# Patient Record
(demographics unavailable — no encounter records)

---

## 2017-03-27 NOTE — EMERGENCY ROOM VISIT NOTE
History


Report prepared by Osiris:  Montserrat Novoa


Under the Supervision of:  Dr. Joy De Oliveira D.O.


First contact with patient:  03:43


Chief Complaint:  OTHER COMPLAINT





History of Present Illness


The patient is a 33 year old male who presents to the Emergency Room for 

medical clearance upon being arrested this evening. The patient is a current 

meth user for the past 2 weeks, using everyday by snorting and smoking the 

drug. The patient has also been using Suboxone for the past 2 years everyday 

orally. He notes the Suboxone is not his prescription. He states that he has 

not been drinking much recently however he is typically a heavy drinker. The 

patient does not normally use meth but began due to fights with his girlfriend 

who is a recovering addict. Last time using meth was yesterday. He denies any 

other symptoms at this time.





   Source of History:  patient


   History Limited By:  other


   Onset:  tonight


   Position:  other (global)


   Quality:  other (medical clearance)


Note:


Patient has been using meth for 2 weeks and Suboxone for 2 years. He denies any 

symptoms at this time.





Review of Systems


See HPI for pertinent positives & negatives. A total of 10 systems reviewed and 

were otherwise negative.





Past Medical & Surgical


No PMH





Family History





Patient reports no known family medical history.





Social History


Smoking Status:  Current Every Day Smoker


Alcohol Use:  heavy


Drug Use:  other (meth)


Marital Status:  in relationship


Housing Status:  lives with significant other


Occupation Status:  employed





Current/Historical Medications


No Active Prescriptions or Reported Meds





Allergies


Coded Allergies:  


     Fish (Verified  Allergy, Severe, ANAPHYLAXIS, 3/27/17)





Physical Exam


Vital Signs











  Date Time  Temp Pulse Resp B/P Pulse Ox O2 Delivery O2 Flow Rate FiO2


 


3/27/17 04:05  84 18 138/93 99   


 


3/27/17 03:39 36.6 78 18 138/93 99 Room Air  











Physical Exam


HEENT: Head - normocephalic and atraumatic   Pupils are equal, round, and 

reactive to light.  Extraocular eye muscles are intact, and sclera are 

anicteric.   Nose -  moist nasal mucosa without discharge. Mouth - moist buccal 

mucosa.  Oropharynx is nonerythematous and there is no tonsillar exudate or 

edema noted.


Neck: Supple; no JVD, nuchal rigidity, cervical lymphadenopathy.


Heart: Regular rate and rhythm.  There is a normal S1 and S2 with no murmurs, 

clicks, or gallops appreciated.


Lungs: Clear to auscultation bilaterally with no wheezes, rales, or rhonchi.


Abdomen: Soft, completely nontender, nondistended, with good bowel sounds.  

There are no palpable pulsatile masses or hepatosplenomegaly.  There is no 

guarding, rigidity, or rebound noted.


Extremities: Superficial laceration of ventral aspect of right 5th digit. No 

evidence of cyanosis, clubbing, or edema.  There are easily palpable peripheral 

pulses.


Skin: warm and dry with good turgor and no rashes.





Medical Decision & Procedures


ED Course


0347: The patient was evaluated in room B12. A complete history and physical 

exam was performed.  I looked at the wound on his right hand.  It was redressed 

with a Band-Aid.





0356: The patient was hemodynamically stable.  He had no complaints of pain.  

He only complained of sleep deprivation.  The patient will be discharged in the 

care of police.





Medical Decision


The patient is a 33 year old male who presents to the ED for medical clearance 

before he goes to penitentiary.





The patient has been abusing oral Suboxone and meth for the past 2 weeks.  He 

has not slept in last 5 days.  He got into an argument with his girlfriend 

tonight and states that he went crazy.  Police were called.  They responded to 

the home and did not find the patient.  He was out for a stroll as he describes 

it.  Finally he got tired and came back to the house.


He was arrested at that time brought here for medical clearance.





Impression





 Primary Impression:  


 Polysubstance abuse


 Additional Impression:  


 Sleep deprivation





Scribe Attestation


The scribe's documentation has been prepared under my direction and personally 

reviewed by me in its entirety. I confirm that the note above accurately 

reflects all work, treatment, procedures, and medical decision making performed 

by me.





Departure Information


Dispostion


Other (Prision)





Prescriptions





No Active Prescriptions or Reported Meds





Referrals


No Doctor, Assigned (PCP)





Forms


HOME CARE DOCUMENTATION FORM,                                                 

               IMPORTANT VISIT INFORMATION, WORK / SCHOOL INSTRUCTIONS





Patient Instructions


My [facility] [employer]





Additional Instructions





Avoid suboxone abuse and meth use.





Keep the wound on your finger clean with soap and water.





Watch for signs of infection





Problem Qualifiers

## 2017-07-11 NOTE — DIAGNOSTIC IMAGING REPORT
CHEST 2 VIEWS ROUTINE



CLINICAL HISTORY: R07.89 Atypical chest pain    



COMPARISON STUDY:  No previous studies for comparison.



FINDINGS: The cardiac and mediastinal contours are normal. There is no evidence

of focal pulmonary consolidation. There is no evidence of failure. No pleural

effusions are visualized.[ There are linear opacities the right lung base,

likely representing subsegmental atelectasis. There is a retroxiphoid opacity,

likely representing a prominent fat pad.



IMPRESSION: 

1. Linear right basilar opacities, likely atelectatic

2. Retroxiphoid opacity, likely secondary to a prominent fat pad

3. No evidence of lobar consolidation. No evidence of failure. No evidence of

pneumothorax.







Electronically signed by:  Haseeb Rooney M.D.

7/11/2017 3:50 PM



Dictated Date/Time:  7/11/2017 3:48 PM

## 2017-07-14 NOTE — DIAGNOSTIC IMAGING REPORT
(CHEST FOR PE) ANGIO WITH



CLINICAL HISTORY: 34 years-old Male presenting with chest pain. 



TECHNIQUE: Multidetector CT angiography of the chest was performed after

administration of intravenous contrast. 3-D volumetric and maximum intensity

projection (MIP) images were subsequently reconstructed for review. IV contrast:

100 mL of Optiray 320.



COMPARISON: Chest x-ray performed the same day.



CT DOSE: The estimated cumulative dose is 389.57 mGy.cm.



FINDINGS:



 topogram: Bandlike opacity in the right lung base.



Pulmonary vasculature:



The study is adequate for assessment of the pulmonary vascular tree. Filling

defect within segmental pulmonary arteries to the anteromedial basal left lower

lobe, which appear acute. Main pulmonary artery not enlarged. No flattening of

the interventricular septum. No intracardiac filling defect.



Remaining chest:



On soft tissue windows, normal thyroid and thoracic inlet. No axillary,

supraclavicular, or mediastinal lymphadenopathy. Few prominent lymph nodes in

the right hilum. Top normal heart size. No pericardial effusion. Trace right

pleural effusion.



On lung windows, bandlike opacities in the right middle and lower lobes.

Groundglass opacity in the right apex and right lung base dependently. No

opacity in the left lung. Airways patent.



On bone windows, normal osseous structures.



IMPRESSION:

1.  Acute pulmonary embolus in the anteromedial basal segmental artery to the

left lower lobe. No radiographic evidence of right heart failure.



2.  Bandlike and hazy opacities in the right lung may represent atelectasis,

although the presence of a trace right effusion may raise concern for other

etiologies including infection.



The report will be called/faxed according to standard departmental protocol.







Electronically signed by:  Kurt Nam M.D.

7/14/2017 3:55 PM



Dictated Date/Time:  7/14/2017 3:45 PM

## 2017-07-14 NOTE — EMERGENCY ROOM VISIT NOTE
History


Report prepared by Osiris:  Yury Patton


Under the Supervision of:  Dr. Stanford Rey M.D.


First contact with patient:  13:45


Chief Complaint:  RESPIRATORY PROBLEMS


Stated Complaint:  DIFFICULTY BREATHING-SENT BY DR.-WTE GARCIA [facility] Triage Summary:  


Pt states "Dr Elvia Epstein said my white count in my one lung is high and in the 


other it's off. I think I have walking pneumonia." Denies cough. Pt c/o pain in 


"my lungs" x 2 days.  "I'm disoriented, I've been fired from 2 jobs in the last 


week". States they did xray and blood work at [facility] but did not prescribe any 


antibiotics. Pt states he passed out twice 2 days ago.





Takes Gabapentin for anxiety.





History of Present Illness


The patient is a 34 year old male who presents to the Emergency Room with 

complaints of constant shortness of breath for the past few days, and a sharp 

pain in his back The patient states that he was told that he had walking 

pneumonia yesterday, though he was not prescribed any antibiotics. The patient 

additionally states that he has a fever and chills. He denies any cough or 

abdominal pain. He states that he might have had pneumonia when he was younger, 

and he smokes about a pack per day. The patient states that he took a 

gabapentin this morning.





   Source of History:  patient


   Onset:  a few days ago


   Position:  other (global)


   Quality:  other (shortness of breath)


   Timing:  constant


   Associated Symptoms:  + fevers, + chills, No cough, No abdominal pain





Review of Systems


All systems have been listed, reviewed, and are negative other than those 

previously mentioned. Please see Additional Medical History Sheet.





Family History





FH: pulmonary embolism


Heart disease


Hypertension





Social History


Smoking Status:  Current Every Day Smoker


Alcohol Use:  heavy


Drug Use:  other


Marital Status:  in relationship


Housing Status:  lives with significant other


Occupation Status:  employed





Current/Historical Medications


No Active Prescriptions or Reported Meds





Allergies


Coded Allergies:  


     Fish (Verified  Allergy, Severe, ANAPHYLAXIS, 7/14/17)





Physical Exam


Vital Signs











  Date Time  Temp Pulse Resp B/P (MAP) Pulse Ox O2 Delivery O2 Flow Rate FiO2


 


7/14/17 17:43  71 18 139/66 97 Room Air  


 


7/14/17 16:08  76 18 120/65 98 Room Air  


 


7/14/17 16:07  80      


 


7/14/17 15:16  89 18 149/82 95 Room Air  


 


7/14/17 14:04   18 167/88 98   


 


7/14/17 12:27     98 Room Air  


 


7/14/17 12:22 37.2 110 18 129/79 97 Room Air  











Physical Exam


GENERAL: Patient awake, alert, oriented x 3.  Patient follows commands.  

Patient does not appear toxic.  Patient is adequately hydrated and well-

nourished.  


SKIN: No erythema, pallor, cyanosis or rash


HEENT: Normal head, pupils equal, reactive to light and accommodation.  Ears 

normal.  Oral cavity and posterior pharynx appear normal.  Neck: Without 

adenopathy, no neck vein distention.


LUNGS: Some pain with deep inspiration. Clear to auscultation.  No wheezes, no 

rales, no rhonchi.


HEART:  No murmurs. No gallops. No rubs


EXTREMITIES: No signs of trauma or infection.


NEUROLOGIC: Cranial nerves II-XII within normal limits.  No gross motor sensory 

function deficits.





Medical Decision & Procedures


ER Provider


Diagnostic Interpretation:


Radiology results as stated below per my review and radiologist interpretation:








CHEST 2 VIEWS ROUTINE





CLINICAL HISTORY: Difficulty breathing    





COMPARISON STUDY:  7/11/2017





FINDINGS: The heart remains normal in size. There are progressive right basilar


airspace opacities. This favors an inflammatory over atelectatic process. Again


evident is a retroxiphoid opacity. Previously this is felt to represent a


prominent fat pad. It is conceivable that this represents a pleural-based area


of focal consolidation. CT angiography the chest might be considered to exclude


a pulmonary infarct.[ 





IMPRESSION: 


1. Progressive right basilar airspace opacities


2. Retroxiphoid opacity. A pleural-based area of parenchymal consolidation


cannot be excluded.


3. CT angiography of the chest should be considered in follow-up to exclude


pulmonary embolism with infarction.





Electronically signed by:  Haseeb Rooney M.D.


7/14/2017 1:54 PM





Dictated Date/Time:  7/14/2017 1:52 PM











(CHEST FOR PE) ANGIO WITH





CLINICAL HISTORY: 34 years-old Male presenting with chest pain. 





TECHNIQUE: Multidetector CT angiography of the chest was performed after


administration of intravenous contrast. 3-D volumetric and maximum intensity


projection (MIP) images were subsequently reconstructed for review. IV contrast:


100 mL of Optiray 320.





COMPARISON: Chest x-ray performed the same day.





CT DOSE: The estimated cumulative dose is 389.57 mGy.cm.





FINDINGS:





 topogram: Bandlike opacity in the right lung base.





Pulmonary vasculature:





The study is adequate for assessment of the pulmonary vascular tree. Filling


defect within segmental pulmonary arteries to the anteromedial basal left lower


lobe, which appear acute. Main pulmonary artery not enlarged. No flattening of


the interventricular septum. No intracardiac filling defect.





Remaining chest:





On soft tissue windows, normal thyroid and thoracic inlet. No axillary,


supraclavicular, or mediastinal lymphadenopathy. Few prominent lymph nodes in


the right hilum. Top normal heart size. No pericardial effusion. Trace right


pleural effusion.





On lung windows, bandlike opacities in the right middle and lower lobes.


Groundglass opacity in the right apex and right lung base dependently. No


opacity in the left lung. Airways patent.





On bone windows, normal osseous structures.





IMPRESSION:


1.  Acute pulmonary embolus in the anteromedial basal segmental artery to the


left lower lobe. No radiographic evidence of right heart failure.





2.  Bandlike and hazy opacities in the right lung may represent atelectasis,


although the presence of a trace right effusion may raise concern for other


etiologies including infection.





The report will be called/faxed according to standard departmental protocol.





Electronically signed by:  Kurt Nam M.D.


7/14/2017 3:55 PM





Dictated Date/Time:  7/14/2017 3:45 PM





Laboratory Results


7/14/17 14:10








Red Blood Count 5.08, Mean Corpuscular Volume 87.2, Mean Corpuscular Hemoglobin 

31.7, Mean Corpuscular Hemoglobin Concent 36.3, Mean Platelet Volume 9.5, 

Neutrophils (%) (Auto) 61.4, Lymphocytes (%) (Auto) 21.8, Monocytes (%) (Auto) 

11.3, Eosinophils (%) (Auto) 4.1, Basophils (%) (Auto) 0.7, Neutrophils # (Auto

) 5.51, Lymphocytes # (Auto) 1.95, Monocytes # (Auto) 1.01, Eosinophils # (Auto

) 0.37, Basophils # (Auto) 0.06





7/14/17 14:10

















Test


  7/14/17


14:10


 


White Blood Count


  8.96 K/uL


(4.8-10.8)


 


Red Blood Count


  5.08 M/uL


(4.7-6.1)


 


Hemoglobin


  16.1 g/dL


(14.0-18.0)


 


Hematocrit 44.3 % (42-52) 


 


Mean Corpuscular Volume


  87.2 fL


()


 


Mean Corpuscular Hemoglobin


  31.7 pg


(25-34)


 


Mean Corpuscular Hemoglobin


Concent 36.3 g/dl


(32-36)


 


Platelet Count


  247 K/uL


(130-400)


 


Mean Platelet Volume


  9.5 fL


(7.4-10.4)


 


Neutrophils (%) (Auto) 61.4 % 


 


Lymphocytes (%) (Auto) 21.8 % 


 


Monocytes (%) (Auto) 11.3 % 


 


Eosinophils (%) (Auto) 4.1 % 


 


Basophils (%) (Auto) 0.7 % 


 


Neutrophils # (Auto)


  5.51 K/uL


(1.4-6.5)


 


Lymphocytes # (Auto)


  1.95 K/uL


(1.2-3.4)


 


Monocytes # (Auto)


  1.01 K/uL


(0.11-0.59)


 


Eosinophils # (Auto)


  0.37 K/uL


(0-0.5)


 


Basophils # (Auto)


  0.06 K/uL


(0-0.2)


 


RDW Standard Deviation


  35.9 fL


(36.4-46.3)


 


RDW Coefficient of Variation


  11.3 %


(11.5-14.5)


 


Immature Granulocyte % (Auto) 0.7 % 


 


Immature Granulocyte # (Auto)


  0.06 K/uL


(0.00-0.02)


 


Anion Gap


  4.0 mmol/L


(3-11)


 


Est Creatinine Clear Calc


Drug Dose 92.3 ml/min 


 


 


Estimated GFR (


American) 90.9 


 


 


Estimated GFR (Non-


American 78.4 


 


 


BUN/Creatinine Ratio 11.9 (10-20) 


 


Calcium Level


  9.3 mg/dl


(8.5-10.1)


 


Total Bilirubin


  0.5 mg/dl


(0.2-1)


 


Aspartate Amino Transf


(AST/SGOT) 26 U/L (15-37) 


 


 


Alanine Aminotransferase


(ALT/SGPT) 33 U/L (12-78) 


 


 


Alkaline Phosphatase


  156 U/L


()


 


Troponin I


  < 0.015 ng/ml


(0-0.045)


 


Total Protein


  7.5 gm/dl


(6.4-8.2)


 


Albumin


  3.6 gm/dl


(3.4-5.0)


 


Globulin


  3.9 gm/dl


(2.5-4.0)


 


Albumin/Globulin Ratio 0.9 (0.9-2) 








Laboratory results as stated above per my review.





Medications Administered











 Medications


  (Trade)  Dose


 Ordered  Sig/Jae


 Route  Start Time


 Stop Time Status Last Admin


Dose Admin


 


 Ibuprofen


  (Motrin Tab)  600 mg  NOW  STAT


 PO  7/14/17 13:49


 7/14/17 13:50 DC 7/14/17 14:05


600 MG











ECG


Indication:  SOB/dyspnea


Rate (beats per minute):  71


Rhythm:  normal sinus


Findings:  nonspecific-ST abn, no ectopy





ED Course


1345: Past medical records reviewed. The patient was evaluated in room B7. A 

complete history and physical examination was performed. 





1349: Ibuprofen 600mg PO





1616:I discussed the patient's case with Dr. Bejarano, Hospitalist, and he thinks 

that the patient can be discharged home.





1700: I discussed the patients case with Dr. Baeza, Internist, and he is 

going to follow up with the patient on Monday at 6pm in his office.





1715: Xarelto Tab 15mg PO





1739: Upon reevaluation, the patient appeared to have improvement of his 

symptoms. I discussed today's findings with him. He verbalized agreement of the 

treatment plan. He was discharged home.





Medical Decision


Nurses notes reviewed. Medical history sheet reviewed. Differential diagnosis 

includes but is not limited to: pneumonia, atelectasis, muscle strain, rib 

fracture.





Blood pressure Screening: Patient was found to have normal blood pressure on 

screening and does not require follow up.





Medication Reconciliation: I attest that I have personally reviewed the patient'

s current medication list.





Multiple labs, EKG and imaging were obtained.  Please see above.  The patient 

has a segmental PE.  I believe this is the source of his discomfort.  I 

discussed care with  who believes he can safely be discharged home on 

Xarelto.  The patient was started on Xarelto now.  He will continue that 

medication bid.  The patient has a scheduled appointment on 7/17 for follow-up.

  He is encouraged to return here sooner if he's having more shortness of 

breath or chest pain.





Consults


Time Called:  1610


Consulting Physician:  Dr. Bejarano, Hospitalist


Returned Call:  1616


I discussed the patient's case with Dr. Bejarano, Hospitalist, and he thinks that 

the patient can be discharged home.


Additional Consults:  


   Time Called:  1650


   Consulted Physician:  Dr. Baeza


   Returned Call:  1700


Additional Comments:


 I discussed the patients case with Dr. Baeza, Internist, and he is going to 

follow up with the patient on Monday at 6pm in his office.





Impression





 Primary Impression:  


 Pulmonary embolism





Scribe Attestation


The scribe's documentation has been prepared under my direction and personally 

reviewed by me in its entirety. I confirm that the note above accurately 

reflects all work, treatment, procedures, and medical decision making performed 

by me.





Departure Information


Dispostion


Home / Self-Care





Prescriptions





No Active Prescriptions or Reported Meds





Referrals


No Doctor, Assigned (PCP)





Forms


HOME CARE DOCUMENTATION FORM,                                                 

               IMPORTANT VISIT INFORMATION, WORK / SCHOOL INSTRUCTIONS





Patient Instructions


My [facility] Heart Butter, Rivaroxaban oral tablets





Additional Instructions





15 mg of Xarelto twice a day with food for the next 21 days.


Follow-up with  on Monday at 6 pm  [facility] office. 


Return here sooner if you become more short of breath or develop more chest 

pain.


Slowly wean your self off of gabapentin.

## 2017-07-14 NOTE — DIAGNOSTIC IMAGING REPORT
CHEST 2 VIEWS ROUTINE



CLINICAL HISTORY: Difficulty breathing    



COMPARISON STUDY:  7/11/2017



FINDINGS: The heart remains normal in size. There are progressive right basilar

airspace opacities. This favors an inflammatory over atelectatic process. Again

evident is a retroxiphoid opacity. Previously this is felt to represent a

prominent fat pad. It is conceivable that this represents a pleural-based area

of focal consolidation. CT angiography the chest might be considered to exclude

a pulmonary infarct.[ 



IMPRESSION: 

1. Progressive right basilar airspace opacities

2. Retroxiphoid opacity. A pleural-based area of parenchymal consolidation

cannot be excluded.

3. CT angiography of the chest should be considered in follow-up to exclude

pulmonary embolism with infarction.







Electronically signed by:  Hsaeeb Rooney M.D.

7/14/2017 1:54 PM



Dictated Date/Time:  7/14/2017 1:52 PM

## 2017-07-21 NOTE — DIAGNOSTIC IMAGING REPORT
(RENAL)RETROPERITONEA COMP



HISTORY: Pain R10.9 Acute right flank pain



COMPARISON:  None.



FINDINGS:



Right kidney: Maximum dimension 12.0 cm. No evidence for hydronephrosis. Normal

corticomedullary differentiation and cortical thickness.



Left kidney:  Maximum dimension 11.9 cm. No evidence for hydronephrosis. Normal

corticomedullary differentiation and cortical thickness.



Bladder: No bladder wall thickening. The bilateral ureteral jets were

identified.



IMPRESSION:  

Normal renal ultrasound. 









The above report was generated using voice recognition software.  It may contain

grammatical, syntax or spelling errors.







Electronically signed by:  Sae Tello M.D.

7/21/2017 12:42 PM



Dictated Date/Time:  7/21/2017 12:41 PM

## 2017-07-22 NOTE — EMERGENCY ROOM VISIT NOTE
History


Report prepared by Osiris:  Madhu Martin


Under the Supervision of:  Dr. Mando Gayle M.D.


First contact with patient:  11:23


Chief Complaint:  PAIN (GENERALIZED)


Stated Complaint:  PE, SOB, CHEST PAIN SWELLING TO LEFT LEG





History of Present Illness


The patient is a 34 year old male who presents to the Emergency Room with 

complaints of constant right sided abdominal pain that started a couple of days 

ago. He rates his pain as a 9/10 in severity. The patient states that he has 

been experiencing chest pains, shortness of breath, swelling to his left leg, 

and states his right kidney "feels like it is going to explode". The patient 

states that he was seen here two days ago and had an ultrasound done yesterday. 

The patient denies taking any medication for his current symptoms. He admits 

that he takes Xarelto and denies missing a dose. The patient also admits that 

he has a history of pulmonary embolism that he was diagnosed with on July 14th.





   Source of History:  patient


   Onset:  a couple of days ago


   Position:  abdomen


   Symptom Intensity:  9/10


   Timing:  constant


   Associated Symptoms:  + chest pain, + SOB





Review of Systems


See HPI for pertinent positives & negatives. A total of 10 systems reviewed and 

were otherwise negative.





Family History





FH: pulmonary embolism


Heart disease


Hypertension





Social History


Smoking Status:  Current Every Day Smoker


Alcohol Use:  heavy


Drug Use:  other


Marital Status:  in relationship


Housing Status:  lives with significant other


Occupation Status:  employed





Current/Historical Medications


Scheduled


Doxycycline Monohydrate (Monodox), 100 MG PO BID


Gabapentin (Neurontin), 200 MG PO TID


Hydroxyzine Hcl (Atarax), 50 MG PO DAILY


Rivaroxaban (Xarelto), 15 MG PO BID





Scheduled PRN


Oxycodone Immediate Rel Tab (Roxicodone Ir), 1-2 TAB PO Q4H PRN for Severe Pain





Allergies


Coded Allergies:  


     Fish (Verified  Allergy, Severe, ANAPHYLAXIS, 7/22/17)





Physical Exam


Vital Signs











  Date Time  Temp Pulse Resp B/P (MAP) Pulse Ox O2 Delivery O2 Flow Rate FiO2


 


7/22/17 14:01  88  133/94 99   


 


7/22/17 13:31    133/94    


 


7/22/17 13:23  81 22     


 


7/22/17 13:01    144/89    


 


7/22/17 12:53  83 15  100   


 


7/22/17 12:31    148/89    


 


7/22/17 12:28  84      


 


7/22/17 12:24    142/92    


 


7/22/17 11:42     100 Room Air  


 


7/22/17 11:41     100 Room Air  


 


7/22/17 10:56 36.8 97 20 143/83 97 Room Air  











Physical Exam


GENERAL: Patient is a healthy-appearing well-nourished mildly uncomfortably 34 

year old male.


HEAD: Normocephalic atraumatic


EYES: Ocular movements intact pupils equal and react to light


OROPHARYNX mucous membranes are moist no exudates present no erythema or edema 

present


NECK: Supple no nuchal rigidity


CHEST: Good equal expansion


LUNGS: Clear and equal to auscultation


CARDIAC: Normal S1 and S2


ABDOMEN: exquisitely tender to right upper abdomen area. no guarding


BACK: No CVA tenderness


EXTREMITIES: No pain upon palpation normal muscle strength in all groups no 

clubbing cyanosis or edema


NEURO: Patient is following commands and answering questions appropriately. 

Alert and oriented x3 Cranial Nerves 2-12 grossly intact





Medical Decision & Procedures


ER Provider


Diagnostic Interpretation:


Radiology results as stated below per my review and radiologist interpretation:





CHEST ONE VIEW PORTABLE





CLINICAL HISTORY: Chest pain and. Shortness of breath.    





COMPARISON STUDY:  Chest CT July 14, 2017.





FINDINGS: There is no pneumothorax. There is no evidence of pulmonary edema.


Cardiac size is normal. A small right pleural effusion is present. Linear right


lower lung opacity favors atelectasis. 





IMPRESSION:  Small right pleural effusion. Mild right lower lung opacity. While


nonspecific, the configuration favors atelectasis.











Electronically signed by:  Thor Hdez M.D.


7/22/2017 12:24 PM





Dictated Date/Time:  7/22/2017 12:21 PM





CT OF THE ABDOMEN AND PELVIS WITH CONTRAST





CLINICAL HISTORY: Right upper quadrant pain. Right flank pain. History of recent


pulmonary embolus.    





COMPARISON STUDY:  Chest CT July 14, 2017. Renal ultrasound July 21, 2017.





TECHNIQUE: Following IV administration of 95 mL of Optiray-320, axial images of


the abdomen and pelvis were obtained from the lung bases to the proximal femurs.


Images were reviewed in the axial, sagittal, and coronal planes. IV contrast was


administered without complication.  A dose lowering technique was utilized


adhering to the principles of ALARA.








CT DOSE: 483.94 mGy.cm





FINDINGS: Visualized portions of the lower chest demonstrate slight increase in


size of a small right pleural effusion since chest CT of July 14, 2017. There


are persistent linear and groundglass right middle lobe and right lower lobe


opacities. The liver, spleen, adrenal glands, kidneys and pancreas are


unremarkable. There is no biliary or pancreatic ductal dilatation. There is no


peripancreatic or pericholecystic infiltration. Caliber and wall thickness of


small and large bowel are normal. The appendix is normal. There is no ascites.


There is no lymphadenopathy. Skeletal structures are unremarkable.











IMPRESSION:  





1. No acute process within the abdomen or pelvis.





2. Slight increase in size of a small right pleural effusion since chest CT of


July 14, 2017. Persistent right middle lobe and lower lobe groundglass and


linear opacities. These findings are nonspecific but could reflect pneumonia or


occult pulmonary infarct given known pulmonary emboli shown on prior chest CT.











Electronically signed by:  Thor Hdez M.D.


7/22/2017 12:51 PM





Dictated Date/Time:  7/22/2017 12:43 PM





Laboratory Results


7/22/17 11:38








Red Blood Count 4.63, Mean Corpuscular Volume 86.0, Mean Corpuscular Hemoglobin 

31.3, Mean Corpuscular Hemoglobin Concent 36.4, Mean Platelet Volume 9.0, 

Neutrophils (%) (Auto) 63.0, Lymphocytes (%) (Auto) 14.9, Monocytes (%) (Auto) 

11.4, Eosinophils (%) (Auto) 9.9, Basophils (%) (Auto) 0.4, Neutrophils # (Auto

) 7.11, Lymphocytes # (Auto) 1.68, Monocytes # (Auto) 1.29, Eosinophils # (Auto

) 1.12, Basophils # (Auto) 0.04





7/22/17 11:38

















Test


  7/22/17


11:38 7/22/17


11:48


 


White Blood Count


  11.29 K/uL


(4.8-10.8) 


 


 


Red Blood Count


  4.63 M/uL


(4.7-6.1) 


 


 


Hemoglobin


  14.5 g/dL


(14.0-18.0) 


 


 


Hematocrit 39.8 % (42-52)  


 


Mean Corpuscular Volume


  86.0 fL


() 


 


 


Mean Corpuscular Hemoglobin


  31.3 pg


(25-34) 


 


 


Mean Corpuscular Hemoglobin


Concent 36.4 g/dl


(32-36) 


 


 


Platelet Count


  286 K/uL


(130-400) 


 


 


Mean Platelet Volume


  9.0 fL


(7.4-10.4) 


 


 


Neutrophils (%) (Auto) 63.0 %  


 


Lymphocytes (%) (Auto) 14.9 %  


 


Monocytes (%) (Auto) 11.4 %  


 


Eosinophils (%) (Auto) 9.9 %  


 


Basophils (%) (Auto) 0.4 %  


 


Neutrophils # (Auto)


  7.11 K/uL


(1.4-6.5) 


 


 


Lymphocytes # (Auto)


  1.68 K/uL


(1.2-3.4) 


 


 


Monocytes # (Auto)


  1.29 K/uL


(0.11-0.59) 


 


 


Eosinophils # (Auto)


  1.12 K/uL


(0-0.5) 


 


 


Basophils # (Auto)


  0.04 K/uL


(0-0.2) 


 


 


RDW Standard Deviation


  34.5 fL


(36.4-46.3) 


 


 


RDW Coefficient of Variation


  11.1 %


(11.5-14.5) 


 


 


Immature Granulocyte % (Auto) 0.4 %  


 


Immature Granulocyte # (Auto)


  0.05 K/uL


(0.00-0.02) 


 


 


Est Creatinine Clear Calc


Drug Dose 92.3 ml/min 


  


 


 


Estimated GFR (


American) 90.9 


  


 


 


Estimated GFR (Non-


American 78.4 


  


 


 


BUN/Creatinine Ratio 12.5 (10-20)  


 


Calcium Level


  8.9 mg/dl


(8.5-10.1) 


 


 


Total Bilirubin


  0.4 mg/dl


(0.2-1) 


 


 


Direct Bilirubin


  0.1 mg/dl


(0-0.2) 


 


 


Aspartate Amino Transf


(AST/SGOT) 19 U/L (15-37) 


  


 


 


Alanine Aminotransferase


(ALT/SGPT) 27 U/L (12-78) 


  


 


 


Alkaline Phosphatase


  152 U/L


() 


 


 


Total Creatine Kinase


  192 U/L


() 


 


 


Creatine Kinase MB


  1.4 ng/ml


(0.5-3.6) 


 


 


Creatine Kinase MB Ratio 0.7 (0-3.0)  


 


Troponin I


  < 0.015 ng/ml


(0-0.045) 


 


 


Total Protein


  7.4 gm/dl


(6.4-8.2) 


 


 


Albumin


  3.5 gm/dl


(3.4-5.0) 


 


 


Lipase


  109 U/L


() 


 


 


Bedside Hemoglobin


  


  13.3 g/dl


(14.0-18.0)


 


Bedside Hematocrit  39 % (42-52) 


 


Bedside Sodium


  


  139 mEq/L


(135-144)


 


Bedside Potassium


  


  4.1 mEq/L


(3.3-5.0)


 


Bedside Chloride


  


  100 mEq/L


(101-112)


 


Bedside Total CO2


  


  28 mEq/l


(24-31)


 


Anion Gap


  


  17.0 mmol/L


(16-25)


 


Bedside Blood Urea Nitrogen


  


  16 mg/dl


(7-18)


 


Bedside Creatinine


  


  1.4 mg/dl


(0.6-1.3)


 


Bedside Glucose (other)


  


  72 mg/dl


(70-99)


 


Bedside Ionized Calcium (Tomás)


  


  1.23 mmol/l


(1.12-1.32)





Labs reviewed by ED physician.





Medications Administered











 Medications


  (Trade)  Dose


 Ordered  Sig/Jae


 Route  Start Time


 Stop Time Status Last Admin


Dose Admin


 


 Sodium Chloride  1,000 ml @ 


 999 mls/hr  Q1H1M STAT


 IV  7/22/17 11:35


 7/22/17 12:35 DC 7/22/17 11:53


999 MLS/HR


 


 Hydromorphone HCl


  (Dilaudid Inj)  1 mg  NOW  STAT


 IV  7/22/17 11:35


 7/22/17 11:37 DC 7/22/17 11:53


1 MG


 


 Doxycycline


 Hyclate


  (Vibramycin Cap)  100 mg  ONE  ONCE


 PO  7/22/17 13:30


 7/22/17 13:31 DC 7/22/17 13:57


100 MG











ECG


Indication:  chest pain


Rate (beats per minute):  76


Rhythm:  normal sinus


Findings:  no acute ischemic change, no ectopy





ED Course


1130: Past medical records reviewed. The patient was evaluated in room C10. A 

complete history and physical examination was performed. 





1135: Dilaudid Injection 1 mg, Sodium Chloride 1000 ml @ 999 mls/hr IV.








1256: I discussed the patient's case with Dr. Griffin Pulmonology. He advises 

that the patient be admitted and follow up with him.





1258: Upon reexamination the patient is resting comfortably. I discussed 

results and treatment plan with the patient and had a full length conversation 

about my recommendation to be admitted. He would not like to be admitted and 

wants to go home. I told him to follow up with Dr. Griffin, Pulmonology. The 

patient is ready for discharge.





1330: Vibramycin Cap 100 mg PO.





Medical Decision


The differential diagnosis includes but is not limited to: etiologies such as 

appendicitis, diverticulitis, PUD, biliary pathology, UTI, pancreatitis, 

obstruction, mesenteric ischemia, aortic pathology, infections, inflammatory 

bowel disease, renal colic, as well as others were entertained. 





Medication Reconciliation: I attest that I have personally reviewed the patient'

s current medication list


Blood Pressure Screening: Patient was found to have an elevated blood pressure 

and was referred to their primary care doctor for recheck and further treatment





This is a 34-year-old male who presents emergency department complaining of 

right-sided chest pain and is increased since last week.  Based on the patient'

s inability to take deep breath on physical exam as well as his history of PE 

the patient was sent over for CAT scan of the abdomen and pelvis.  This was 

concerning for right pulmonary infarction however there does not appear to be 

any evidence of PE at this point.  Patient reports she has not missed any doses 

of his arrival toe.  I did discuss this case with pulmonology and recommended 

to the patient that he be admitted however at this point he is adamantly 

refusing.  I am going to cover him for pneumonia and place him on doxycycline 

and I stressed the need to have his pain under control.  In the emergency 

department he was given Dilaudid.  I recommended Tylenol at home as well as on 

see for breakthrough pain as well as a follow-up with Dr. Griffin.





The patient has demonstrated no significant defect in the decision-making 

capacity to make choices.  The encounter had a good level of communication with 

language the patient can easily understand.  I feel trust was present and 

conveyed that our action/intentions were the best interest of the patient.  The 

patient was given all relevant information and reiterated the explained risks 

and benefits.  The patient explained the reasoning for refusing treatment 

clearly.  The patient possesses and expresses a set of values and goals, the 

ability to communicate and understand, and an ability to reason and deliberate.

  Despite acting emphatically, attentively and with the utmost patient's the 

patient declined further treatment.  I offered options, negotiated, and 

explored every reasonable choice.  I must respect the patient's autonomy and 

that they feel that their choices are best for them despite the associated 

risks of leaving without completing the evaluation.











The patient was informed about the findings as listed above.  All questions 

were answered and he was pleased with the treatment.  Return instructions were 

outlined and the patient was discharged in stable condition.





Consults


Time Called:  2116


Consulting Physician:  Dr. Griffin, Pulmonology


Returned Call:  [phone]


I discussed the patient's case with Dr. Griffin Pulmonology. He advises that the 

patient be admitted and follow up with him.





Impression





 Primary Impression:  


 Pulmonary infarction





Scribe Attestation


The scribe's documentation has been prepared under my direction and personally 

reviewed by me in its entirety. I confirm that the note above accurately 

reflects all work, treatment, procedures, and medical decision making performed 

by me.





Departure Information


Dispostion


Home / Self-Care





Prescriptions





Doxycycline Monohydrate (Monodox) 100 Mg Cap


100 MG PO BID for 10 Days, #20 CAP


   Prov: Mando Gayle MD         7/22/17 


Oxycodone Immediate Rel Tab (ROXICODONE IR) 5 Mg Tab


1-2 TAB PO Q4H Y for Severe Pain, #24 TAB


   Prov: Mando Gayle MD         7/22/17





Referrals


Rashad Baeza M.D. (PCP)





Forms


HOME CARE DOCUMENTATION FORM,                                                 

               IMPORTANT VISIT INFORMATION, WORK / SCHOOL INSTRUCTIONS





Patient Instructions


Incentive Spirometer [place], [facility]





Additional Instructions





Follow up with DR Griffin's office


Use incentive spirometer every 15 minutes





You were found to have an elevated blood pressure today (>120 sytolic or >90 

diastolic). Per medicare guidelines, you need to follow up with this blood 

pressure screening with your Primary Care Physician (PCP). For a new PCP call 

[phone].   





You received narcotic or benzodiazepene medication while in the emergency room 

today.  Do not drive, operate heavy machinery, or drink alcohol under the 

influence of this medication.  





Take 1000 mg Tylenol every 6 hours


Take oxy for breakthrough pain


Continue Xarelto








You have been examined and treated today on an emergency basis only. This is 

not a substitute for, or an effort to provide, complete comprehensive medical 

care. It is impossible to recognize and treat all injuries or illnesses in a 

single emergency department visit. It is therefore important that you follow up 

closely with Dr Baeza.  Call as soon as possible for an appointment.  





Thank you for your time and consideration.  I look forward to speaking with you 

again soon.  Please don't hesitate to call us if you have any questions.

## 2017-07-22 NOTE — DIAGNOSTIC IMAGING REPORT
CT OF THE ABDOMEN AND PELVIS WITH CONTRAST



CLINICAL HISTORY: Right upper quadrant pain. Right flank pain. History of recent

pulmonary embolus.    



COMPARISON STUDY:  Chest CT July 14, 2017. Renal ultrasound July 21, 2017.



TECHNIQUE: Following IV administration of 95 mL of Optiray-320, axial images of

the abdomen and pelvis were obtained from the lung bases to the proximal femurs.

Images were reviewed in the axial, sagittal, and coronal planes. IV contrast was

administered without complication.  A dose lowering technique was utilized

adhering to the principles of ALARA.





CT DOSE: 483.94 mGy.cm



FINDINGS: Visualized portions of the lower chest demonstrate slight increase in

size of a small right pleural effusion since chest CT of July 14, 2017. There

are persistent linear and groundglass right middle lobe and right lower lobe

opacities. The liver, spleen, adrenal glands, kidneys and pancreas are

unremarkable. There is no biliary or pancreatic ductal dilatation. There is no

peripancreatic or pericholecystic infiltration. Caliber and wall thickness of

small and large bowel are normal. The appendix is normal. There is no ascites.

There is no lymphadenopathy. Skeletal structures are unremarkable.







IMPRESSION:  



1. No acute process within the abdomen or pelvis.



2. Slight increase in size of a small right pleural effusion since chest CT of

July 14, 2017. Persistent right middle lobe and lower lobe groundglass and

linear opacities. These findings are nonspecific but could reflect pneumonia or

occult pulmonary infarct given known pulmonary emboli shown on prior chest CT.







Electronically signed by:  Thor Hdez M.D.

7/22/2017 12:51 PM



Dictated Date/Time:  7/22/2017 12:43 PM

## 2017-07-22 NOTE — DIAGNOSTIC IMAGING REPORT
CHEST ONE VIEW PORTABLE



CLINICAL HISTORY: Chest pain and. Shortness of breath.    



COMPARISON STUDY:  Chest CT July 14, 2017.



FINDINGS: There is no pneumothorax. There is no evidence of pulmonary edema.

Cardiac size is normal. A small right pleural effusion is present. Linear right

lower lung opacity favors atelectasis. 



IMPRESSION:  Small right pleural effusion. Mild right lower lung opacity. While

nonspecific, the configuration favors atelectasis.







Electronically signed by:  Thor Hdez M.D.

7/22/2017 12:24 PM



Dictated Date/Time:  7/22/2017 12:21 PM
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
## 2017-07-27 NOTE — DIAGNOSTIC IMAGING REPORT
BILATERAL LOWER EXTREMITY VENOUS DOPPLER



HISTORY:      Pulmonary embolus.



COMPARISON STUDY:  CTA pulmonary artery study of same day.



FINDINGS: There is normal compressibility, flow, and augmentation within the

bilateral lower extremity deep venous systems.



IMPRESSION:  

No DVT within the right or left lower extremity.







Electronically signed by:  Elvis Dubois M.D.

7/27/2017 9:47 PM



Dictated Date/Time:  7/27/2017 9:46 PM

## 2017-07-27 NOTE — PHARMACY PROGRESS NOTE
Pharmacy Abx Dose Short Note


Date of Service


Jul 27, 2017.





Assessment & Plan


Pt is a 35 yo M presents after being seen by Dr. Hudson today. Starting empiric 

Vanco/Zosyn for PNA or IE (infective endocarditis). He recently was prescribed 

PO Doxycycline. He currently is afebrile, experiencing leukocytosis with a left 

shift, tachycardic, RR WNL. Currently no SOB or wheezing. CRP is pending. Renal 

fxn looks to be at baseline: pt population p'kinetics: ke=0.081, t1/2=8.5, Vd=

0.7. MRSA nasal is pending, BC pending, Sputum pending. 





He has a h/o of IVDU (Minor [place] Criteria). At the current time unsure if he 

has any Major [place] Criteria for infective endocarditis; however, proper 

pathogens are covered: MRSA, MSSA, Enterococci, HACEK group. Cardiology and ID 

both consulted. 





Vanco: 


* Vanco 2000mg(24mg/kg) x1 @2000, to achieve a peak of about 34mcg/mL


* Then Vanco 1250mg(14.7mg/kg) q10 @0200 7/28/17


* Goal trough: 15-20mcg/mL


* Trough ordered: 7/29/17 @0730 prior to the 4th MD.








Pharmacy will continue to follow and will adjust dose/frequency as necessary.  

Thank you.

## 2017-07-27 NOTE — DIAGNOSTIC IMAGING REPORT
(CHEST FOR PE) ANGIO WITH



CT DOSE: 497.90 mGycm



HISTORY:  34 years-old Male with acute chest pain and pneumonia. Recently

diagnosed acute pulmonary embolus.



TECHNIQUE: Multiple CTA images of the chest were obtained after the intravenous

administration of 111ml Optiray 320.  Coronal and sagittal MIPS were obtained

from the axial data set and were submitted for review.  A dose lowering

technique was utilized adhering to the principles of ALARA.





COMPARISON: CT chest 7/14/2017.



FINDINGS: 



CTA: Heart is normal in size. No thoracic aortic aneurysm or dissection. There

is near complete resolution of the previously noted emboli of the left lower

lobe. There is linear filling defect seen within a lingular lobar branch on

image 182 suggesting residual chronic thrombus. No new pulmonary emboli are

identified.



CT CHEST: There is a small-to-moderate right pleural effusion. Multifocal

consolidative opacities involve the right middle and right lower lobes. Ground

glass opacities involve the right upper lobe. The left lung is generally clear.

No obstructing bronchial lesion is seen. The upper abdominal structures are

within normal limits. The soft tissues are unremarkable. The bones appear

intact.





IMPRESSION:  

1. Near complete resolution of the previously noted pulmonary emboli of the left

lower lobe. Likely minimal residual thrombus is seen within a lingular lobar

branch. No acute occlusive pulmonary emboli are identified.

2. Small to moderate right pleural effusion with multifocal consolidative

opacities of the right middle and lower lobes suggest pneumonia with associated

bronchitis.

3. Groundglass opacities of the upper lobes suggest associated atelectasis.







The above report was generated using voice recognition software. It may contain

grammatical, syntax or spelling errors.







Electronically signed by:  Elvis Dubois M.D.

7/27/2017 9:37 PM



Dictated Date/Time:  7/27/2017 9:30 PM

## 2017-07-28 NOTE — PROGRESS NOTE
Subjective


Date of Service:


Jul 28, 2017.


Subjective


this pt is still with pleuritic pain, no cough tobacco withdrawal qwelled with 

patch





Problem List


Medical Problems:


(1) Polysubstance abuse


Status: Acute  





(2) Pulmonary embolism


Status: Acute  





(3) Pulmonary infarction


Status: Acute  





(4) Sleep deprivation


Status: Acute  











Review of Systems


Constitutional:  No fever, No chills


Respiratory:  + dyspnea on exertion, No cough, No sputum, No wheezing, No 

shortness of breath


Cardiac:  + chest pain, No orthopnea, No PND, No edema


Abdomen:  No pain, No nausea, No vomiting, No diarrhea


Psychiatric:  No depression symptoms, No anhedonism





Objective


Vital Signs











  Date Time  Temp Pulse Resp B/P (MAP) Pulse Ox O2 Delivery O2 Flow Rate FiO2


 


7/28/17 08:02 36.7 83 19 122/79 (93) 95 Room Air  


 


7/28/17 07:28      Room Air  


 


7/28/17 04:00      Room Air  


 


7/28/17 03:20 36.4 80 18 126/72 (90) 96 Room Air  


 


7/27/17 23:59      Room Air  


 


7/27/17 23:31 36.8 76 22 132/86 (101) 97 Room Air  


 


7/27/17 20:00      Room Air  


 


7/27/17 19:36 36.9 91 22 117/71 (86) 93 Room Air  


 


7/27/17 17:15 36.7 103 16 146/82 98 Room Air  











Physical Exam


General Appearance:  WD/WN, + mild distress


Neck:  supple, no JVD


Respiratory/Chest:  no respiratory distress, + decreased breath sounds, + 

accessory muscle use


Cardiovascular:  regular rate, rhythm, no murmur


Abdomen:  normal bowel sounds, non tender, soft


Extremities:  no pedal edema, no calf tenderness


Neurologic/Psychiatric:  alert, oriented x 3





Laboratory Results





Last 24 Hours








Test


  7/27/17


18:09 7/27/17


18:48 7/28/17


00:25 7/28/17


01:45


 


Creatine Kinase MB Ratio     


 


White Blood Count  12.51 K/uL   


 


Red Blood Count  4.33 M/uL   


 


Hemoglobin  13.1 g/dL   


 


Hematocrit  37.3 %   


 


Mean Corpuscular Volume  86.1 fL   


 


Mean Corpuscular Hemoglobin  30.3 pg   


 


Mean Corpuscular Hemoglobin


Concent 


  35.1 g/dl 


  


  


 


 


Platelet Count  309 K/uL   


 


Mean Platelet Volume  9.0 fL   


 


Neutrophils (%) (Auto)  56.0 %   


 


Lymphocytes (%) (Auto)  20.5 %   


 


Monocytes (%) (Auto)  10.4 %   


 


Eosinophils (%) (Auto)  12.2 %   


 


Basophils (%) (Auto)  0.2 %   


 


Neutrophils # (Auto)  6.99 K/uL   


 


Lymphocytes # (Auto)  2.57 K/uL   


 


Monocytes # (Auto)  1.30 K/uL   


 


Eosinophils # (Auto)  1.53 K/uL   


 


Basophils # (Auto)  0.03 K/uL   


 


RDW Standard Deviation  35.1 fL   


 


RDW Coefficient of Variation  11.2 %   


 


Immature Granulocyte % (Auto)  0.7 %   


 


Immature Granulocyte # (Auto)  0.09 K/uL   


 


Erythrocyte Sedimentation Rate  10 mm/hr   


 


Prothrombin Time  11.3 SECONDS   


 


Prothromb Time International


Ratio 


  1.1 


  


  


 


 


Activated Partial


Thromboplast Time 


  35.7 SECONDS 


  


  


 


 


Partial Thromboplastin Ratio  1.4   


 


Sodium Level  140 mmol/L   


 


Potassium Level  3.7 mmol/L   


 


Chloride Level  107 mmol/L   


 


Carbon Dioxide Level  30 mmol/L   


 


Anion Gap  3.0 mmol/L   


 


Blood Urea Nitrogen  12 mg/dl   


 


Creatinine  1.20 mg/dl   


 


Est Creatinine Clear Calc


Drug Dose 


  92.3 ml/min 


  


  


 


 


Estimated GFR (


American) 


  90.9 


  


  


 


 


Estimated GFR (Non-


American 


  78.4 


  


  


 


 


BUN/Creatinine Ratio  9.9   


 


Random Glucose  98 mg/dl   


 


Calcium Level  8.7 mg/dl   


 


Total Bilirubin  0.4 mg/dl   


 


Aspartate Amino Transf


(AST/SGOT) 


  18 U/L 


  


  


 


 


Alanine Aminotransferase


(ALT/SGPT) 


  23 U/L 


  


  


 


 


Alkaline Phosphatase  152 U/L   


 


Creatine Kinase MB  1.6 ng/ml   


 


Troponin I  < 0.015 ng/ml  < 0.015 ng/ml  


 


C-Reactive Protein  3.42 mg/dl   


 


Total Protein  7.0 gm/dl   


 


Albumin  3.3 gm/dl   


 


Globulin  3.7 gm/dl   


 


Albumin/Globulin Ratio  0.9   


 


HIV (1&2) Ab and P24 Ag, 4th


Gener 


  NEG 


  


  


 


 


Procalcitonin   0.19 ng/ml  


 


Urine Color    YELLOW 


 


Urine Appearance    CLEAR 


 


Urine pH    5.0 


 


Urine Specific Gravity    > 1.045 


 


Urine Protein    NEG 


 


Urine Glucose (UA)    NEG 


 


Urine Ketones    NEG 


 


Urine Occult Blood    NEG 


 


Urine Nitrite    NEG 


 


Urine Bilirubin    NEG 


 


Urine Urobilinogen    NEG 


 


Urine Leukocyte Esterase    NEG 


 


Urine WBC (Auto)    1-5 /hpf 


 


Urine RBC (Auto)    0-4 /hpf 


 


Urine Hyaline Casts (Auto)    0 /lpf 


 


Urine Epithelial Cells (Auto)    0-5 /lpf 


 


Urine Bacteria (Auto)    NEG 


 


Test


  7/28/17


07:10 


  


  


 


 


White Blood Count 11.21 K/uL    


 


Red Blood Count 4.34 M/uL    


 


Hemoglobin 13.1 g/dL    


 


Hematocrit 37.6 %    


 


Mean Corpuscular Volume 86.6 fL    


 


Mean Corpuscular Hemoglobin 30.2 pg    


 


Mean Corpuscular Hemoglobin


Concent 34.8 g/dl 


  


  


  


 


 


Platelet Count 294 K/uL    


 


Mean Platelet Volume 8.8 fL    


 


RDW Standard Deviation 35.4 fL    


 


RDW Coefficient of Variation 11.2 %    


 


Neutrophils % (Manual) 58.3 %    


 


Lymphocytes % (Manual) 20.9 %    


 


Monocytes % (Manual) 8.7 %    


 


Eosinophils % (Manual) 10.4 %    


 


Myelocytes % 1.7 %    


 


Neutrophils # (Manual) 6.54 K/uL    


 


Total Absolute Neutrophils 6.54 K/uL    


 


Lymphocytes # (Manual) 2.34 K/uL    


 


Total Absolute Lymphocytes 2.34 K/uL    


 


Monocytes # (Manual) 0.98 K/uL    


 


Eosinophils # (Manual) 1.17 K/uL    


 


Myelocytes # 0.19 K/uL    


 


Sodium Level 139 mmol/L    


 


Potassium Level 3.5 mmol/L    


 


Chloride Level 106 mmol/L    


 


Carbon Dioxide Level 31 mmol/L    


 


Anion Gap 2.0 mmol/L    


 


Blood Urea Nitrogen 10 mg/dl    


 


Creatinine 1.10 mg/dl    


 


Est Creatinine Clear Calc


Drug Dose 100.7 ml/min 


  


  


  


 


 


Estimated GFR (


American) 101.0 


  


  


  


 


 


Estimated GFR (Non-


American 87.1 


  


  


  


 


 


BUN/Creatinine Ratio 9.1    


 


Random Glucose 87 mg/dl    


 


Lactic Acid Level 0.8 mmol/L    


 


Calcium Level 8.6 mg/dl    


 


Phosphorus Level 4.1 mg/dl    


 


Magnesium Level 2.2 mg/dl    


 


Total Bilirubin 0.6 mg/dl    


 


Aspartate Amino Transf


(AST/SGOT) 17 U/L 


  


  


  


 


 


Alanine Aminotransferase


(ALT/SGPT) 22 U/L 


  


  


  


 


 


Alkaline Phosphatase 147 U/L    


 


Troponin I < 0.015 ng/ml    


 


Total Protein 6.7 gm/dl    


 


Albumin 3.2 gm/dl    


 


Globulin 3.5 gm/dl    


 


Albumin/Globulin Ratio 0.9    











Assessment and Plan


34 M with chest pain recent PE, concern for pneumonia,  with PMHx of PE, lupus 

anticoagulant positive, sickle-cell trait, h/o drug abuse, tobacco abuse, 

depression/anxiety/PTSD, 





chest pain- ECHO  endocarditis 


- Hold Xarelto and begin IV Heparin, given hypercoagulable lupus anticoagulant 

positive and sickle cell trait,   xarelto may not be best choice and pulmonary 

med feels should transition to coumadin





- Broad spectrum IV antibiotics w/ IV Zosyn 


- Oxycodone and Tylenol PRN for pain control  


- Consult Dr. Hamlin no plans for thoracentesis 





Anxiety/depression, PTSD, explosive disorder: Continue Gabapentin 100 mg TID 





h/o drug abuse, counselled on stopping





h/o tobacco abuse- 1/2-1 pack per day: Nicotine patch 





DVT Prophylaxis: Xarelto/IV Heparin 





Code Status: LEVEL I, FULL





Dispo: From home-  consulted

## 2017-07-28 NOTE — PULMONARY CONSULTATION
History


General


Date of Service:


Jul 28, 2017.


Stated Complaint:


Pleurisy, Pneumonia, Pleural Effusion





HPI


The patient is a 34 year old male who presents to [facility] 

with complaints of Pneumonia, Pleural Effusion. The patient's primary care 

provider is Rashad Baeza M.D..








Patient is a pleasant 34-year-old male admitted for chest pain. His a past 

medical history significant for lupus anticoagulation testing positive, sickle 

cell trait, history of drug abuse, tobacco use/abuse, depression, anxiety, PTSD 

and aggressive behavior. He is a recent complex history in which she presented 

to the emergency room on 7/14/2017 and was diagnosed by CT angiographic 

criteria for acute pulmonary embolisms. He was started on Zaroxolyn at that 

time and discharged as he refused admission and was notably hemodynamically 

stable. He then represented to the emergency room on 7/22/2017 where he was 

worked up for right-sided flank and abdominal discomfort with a diagnosis of 

pneumonia made it was started on doxycycline.  Patient is also recently been 

worked up for kidney stones and ruled out on 7/20/2017 by Dr. Lynn.  As 

stated earlier the patient does have a history of hypercoagulable state with 

sickle cell trait as well as SLE and a strong family history with his sister/

triplet and father both having clotting episodes in the past. I spoke to the 

patient at length and he denies any history of previous anticoagulation prior 

to 7/14/2017. Recent hypercoagulable workup didn't demonstrate lupus 

anticoagulant positive study. At this time the patient still notes severe right-

sided chest pain at 8 out of 10 at times with deep inspiration or aggressive 

motion which is consistent with pleurisy. He denies: Fever, chills, productive 

cough, classic cardiac chest pain or unintentional weight loss.





Current Work-Up


EKG:    NSR, rate: 76, with signs of LVH   


WBC:   13K11K


H/H:   13/38


PLT:   309--294


ESR:   10 (WNL)


PT:   11.3


INR:   1.1


aPPT:   >300


PTT ratio:   >11


Troponin I:   <0.015


Albumin:   3.2


HIV:   Negative


Pending:


      Urine Legionella


      Quantiferon Gold


      Beta-D-Glucan 


CTA Thorax (7/27/17)


   No Acute PE noted


   Near complete resolution of the emboli in the LLL


   Decreased emboli burden in the Lingula


   Small  moderate right sided pleural effusion


   Opacification of the RML & RLL with associated bronchiectasis


   GGO





Review of Systems


Constitutional:  reports: no symptoms


Eyes:  reports: no symptoms


ENT:  reports: no symptoms


Cardiovascular:  reports: as stated in HPI


Respiratory:  reports: as stated in HPI


Gastrointestinal:  reports: no symptoms


Genitourinary - Male:  reports: no symptoms


Musculoskeletal:  reports: no symptoms


Integumentary:  reports: no symptoms


Neurologic:  reports: no symptoms


Psychiatric:  reports: no symptoms


Endocrine:  no symptoms


Hematologic / Lymphatic:  no symptoms


Allergic / Immunologic:  no symptoms





Past Medical History


Past Medical History:


lupus anticoagulant positive


sickle-cell trait


h/o drug abuse-- snorting/smoking meth and marijuana


tobacco abuse


depression/anxiety/PTSD/explosive disorder 


Anxiety


Atypical Chest pain


Epididymitis


Glaucoma


PTSDprevious  service


Past Surgical History:


none per patient





Family History





FH: pulmonary embolism


Heart disease


Hypertension


pulmonary embolism


Heart disease


Hypertension


Bipolar/Depression


HTN


Sickle cell trait





Social History


Smoking Status:  Current Every Day Smoker


Smokeless Tobacco Use:  No


Alcohol Use:  occasionally/Hx of abuse


Drug Use:  snorting/smoking meth and marijuana


Marital Status:  in relationship


Occupational Status:  employed


1 of 3 siblings/triplets


Hx Tobacco Use In Past Year?:  Yes


Smoking Status:  Current Every Day Smoker


Marital status:  in relationship


Occupational Status:  employed





Allergies


Coded Allergies:  


     Fish (Verified  Allergy, Severe, ANAPHYLAXIS, 7/22/17)





Current Medications





Reported Home Medications








 Medications  Dose


 Route/Sig


 Max Daily Dose Days Date Category


 


 Monodox


  (Doxycycline


 Monohydrate) 100


 Mg Cap  100 Mg


 PO BID


   10 7/22/17 Rx


 


 Roxicodone Ir


  (Oxycodone HCl) 5


 Mg Tab  1-2 Tab


 PO Q4H PRN


    7/22/17 Rx


 


 Xarelto


  (Rivaroxaban) 15


 Mg Tab  15 Mg


 PO BID


    7/22/17 Reported


 


 Atarax


  (Hydroxyzine Hcl)


 50 Mg Tab  50 Mg


 PO DAILY


    7/22/17 Reported


 


 Neurontin


  (Gabapentin) 100


 Mg Cap  200 Mg


 PO TID


    7/22/17 Reported











Physical


Physical Exam


Vital Signs:











  Date Time  Temp Pulse Resp B/P (MAP) Pulse Ox O2 Delivery O2 Flow Rate FiO2


 


7/28/17 08:02 36.7 83 19 122/79 (93) 95 Room Air  


 


7/28/17 07:28      Room Air  


 


7/28/17 04:00      Room Air  


 


7/28/17 03:20 36.4 80 18 126/72 (90) 96 Room Air  


 


7/27/17 23:59      Room Air  


 


7/27/17 23:31 36.8 76 22 132/86 (101) 97 Room Air  


 


7/27/17 20:00      Room Air  


 


7/27/17 19:36 36.9 91 22 117/71 (86) 93 Room Air  


 


7/27/17 17:15 36.7 103 16 146/82 98 Room Air  








General Appearance:  WELL-APPEARING, WD/WN, NO APPARENT DISTRESS, uncomfortable


Head:  NORMOCEPHALIC, ATRAUMATIC


Eyes:  PERRLA, NO DISCHARGE, EOMI, SCLERAE NORMAL, CONJUNCTIVAE NORMAL


ENT:  NORMAL EAR EXAM, NORMAL NASAL EXAM, NORMAL MOUTH EXAM, NORMAL THROAT EXAM

, NORMAL DENTAL EXAM, NORMAL SINUS EXAM


Neck:  NORMAL RANGE OF MOTION, NO TENDERNESS, TRACHEA MIDLINE, NO STRIDOR


Respiratory:  other (decreased breath sounds right lower lobe with dullness to 

percussion and notable splinting)


Cardiovasular:  REGULAR RATE/RHYTHM, NORMAL S1S2, NO M/G/R, NO MURMUR, NO GALLOP


Abdomen:  NON TENDER, NORMAL BOWEL SOUNDS, NO REBOUND, NO MASSES, NO GUARDING, 

NO ORGANOMEGALY


Genitourinary - Male:  EXTERNAL GENITALIA NORMAL


Back:  NORMAL INSPECTION, NO MIDLINE TENDERNESS, NO CVA TENDERNESS, NO 

PARAVERTEBRAL TTP


Upper Extremities:  NO EDEMA, NO DEFORMITY, NORMAL ROM


Lower Extremities:  NO EDEMA, NO DEFORMITY, NORMAL ROM


Pulses:  carotid (R) (2+), carotid (L) (2+), posterior tibial (R), posterior 

tibial (L) (2+)


Neuro:  ALERT, ORIENTED x 3, NORMAL MOTOR EXAM, NORMAL SENSATION, NORMAL 

CEREBELLAR EXAM, NORMAL SPEECH


Reflexes:  biceps (R) (2+), bicpes (L) (2+), achilles (R) (2+), achilles (L) (2+

)


Babinski Testing:  right (downgoing), left (downgoing)


Psychiatric:  NORMAL AFFECT, NO SUICIDAL IDEATION





Diagnostics


Labs





Results Past 24 Hours








Test


  7/27/17


18:09 7/27/17


18:48 7/28/17


00:25 7/28/17


01:45 Range/Units


 


 


Creatine Kinase MB Ratio     0-3.0  


 


White Blood Count  12.51   4.8-10.8  K/uL


 


Red Blood Count  4.33   4.7-6.1  M/uL


 


Hemoglobin  13.1   14.0-18.0  g/dL


 


Hematocrit  37.3   42-52  %


 


Mean Corpuscular Volume  86.1     fL


 


Mean Corpuscular Hemoglobin  30.3   25-34  pg


 


Mean Corpuscular Hemoglobin


Concent 


  35.1


  


  


  32-36  g/dl


 


 


Platelet Count  309   130-400  K/uL


 


Mean Platelet Volume  9.0   7.4-10.4  fL


 


Neutrophils (%) (Auto)  56.0    %


 


Lymphocytes (%) (Auto)  20.5    %


 


Monocytes (%) (Auto)  10.4    %


 


Eosinophils (%) (Auto)  12.2    %


 


Basophils (%) (Auto)  0.2    %


 


Neutrophils # (Auto)  6.99   1.4-6.5  K/uL


 


Lymphocytes # (Auto)  2.57   1.2-3.4  K/uL


 


Monocytes # (Auto)  1.30   0.11-0.59  K/uL


 


Eosinophils # (Auto)  1.53   0-0.5  K/uL


 


Basophils # (Auto)  0.03   0-0.2  K/uL


 


RDW Standard Deviation  35.1   36.4-46.3  fL


 


RDW Coefficient of Variation  11.2   11.5-14.5  %


 


Immature Granulocyte % (Auto)  0.7    %


 


Immature Granulocyte # (Auto)  0.09   0.00-0.02  K/uL


 


Erythrocyte Sedimentation Rate  10   0-14  mm/hr


 


Prothrombin Time


  


  11.3


  


  


  9.0-12.0


SECONDS


 


Prothromb Time International


Ratio 


  1.1


  


  


  0.9-1.1  


 


 


Activated Partial


Thromboplast Time 


  35.7


  


  


  21.0-31.0


SECONDS


 


Partial Thromboplastin Ratio  1.4    


 


Sodium Level  140   136-145  mmol/L


 


Potassium Level  3.7   3.5-5.1  mmol/L


 


Chloride Level  107     mmol/L


 


Carbon Dioxide Level  30   21-32  mmol/L


 


Anion Gap  3.0   3-11  mmol/L


 


Blood Urea Nitrogen  12   7-18  mg/dl


 


Creatinine


  


  1.20


  


  


  0.60-1.40


mg/dl


 


Est Creatinine Clear Calc


Drug Dose 


  92.3


  


  


   ml/min


 


 


Estimated GFR (


American) 


  90.9


  


  


   


 


 


Estimated GFR (Non-


American 


  78.4


  


  


   


 


 


BUN/Creatinine Ratio  9.9   10-20  


 


Random Glucose  98   70-99  mg/dl


 


Calcium Level  8.7   8.5-10.1  mg/dl


 


Total Bilirubin  0.4   0.2-1  mg/dl


 


Aspartate Amino Transf


(AST/SGOT) 


  18


  


  


  15-37  U/L


 


 


Alanine Aminotransferase


(ALT/SGPT) 


  23


  


  


  12-78  U/L


 


 


Alkaline Phosphatase  152     U/L


 


Creatine Kinase MB  1.6   0.5-3.6  ng/ml


 


Troponin I  < 0.015 < 0.015  0-0.045  ng/ml


 


C-Reactive Protein  3.42   0-0.29  mg/dl


 


Total Protein  7.0   6.4-8.2  gm/dl


 


Albumin  3.3   3.4-5.0  gm/dl


 


Globulin  3.7   2.5-4.0  gm/dl


 


Albumin/Globulin Ratio  0.9   0.9-2  


 


HIV (1&2) Ab and P24 Ag, 4th


Gener 


  NEG


  


  


  NEG  


 


 


Procalcitonin   0.19  0-0.5  ng/ml


 


Urine Color    YELLOW  


 


Urine Appearance    CLEAR CLEAR  


 


Urine pH    5.0 4.5-7.5  


 


Urine Specific Gravity    > 1.045 1.000-1.030  


 


Urine Protein    NEG NEG  


 


Urine Glucose (UA)    NEG NEG  


 


Urine Ketones    NEG NEG  


 


Urine Occult Blood    NEG NEG  


 


Urine Nitrite    NEG NEG  


 


Urine Bilirubin    NEG NEG  


 


Urine Urobilinogen    NEG NEG  


 


Urine Leukocyte Esterase    NEG NEG  


 


Urine WBC (Auto)    1-5 0-5  /hpf


 


Urine RBC (Auto)    0-4 0-4  /hpf


 


Urine Hyaline Casts (Auto)    0 0-5  /lpf


 


Urine Epithelial Cells (Auto)    0-5 0-5  /lpf


 


Urine Bacteria (Auto)    NEG NEG  


 


Test


  7/28/17


07:10 


  


  


  Range/Units


 


 


White Blood Count 11.21    4.8-10.8  K/uL


 


Red Blood Count 4.34    4.7-6.1  M/uL


 


Hemoglobin 13.1    14.0-18.0  g/dL


 


Hematocrit 37.6    42-52  %


 


Mean Corpuscular Volume 86.6      fL


 


Mean Corpuscular Hemoglobin 30.2    25-34  pg


 


Mean Corpuscular Hemoglobin


Concent 34.8


  


  


  


  32-36  g/dl


 


 


Platelet Count 294    130-400  K/uL


 


Mean Platelet Volume 8.8    7.4-10.4  fL


 


RDW Standard Deviation 35.4    36.4-46.3  fL


 


RDW Coefficient of Variation 11.2    11.5-14.5  %


 


Neutrophils % (Manual) 58.3     %


 


Lymphocytes % (Manual) 20.9     %


 


Monocytes % (Manual) 8.7     %


 


Eosinophils % (Manual) 10.4     %


 


Myelocytes % 1.7     %


 


Neutrophils # (Manual) 6.54    1.4-6.5  K/uL


 


Total Absolute Neutrophils 6.54    1.4-6.5  K/uL


 


Lymphocytes # (Manual) 2.34    1.2-3.4  K/uL


 


Total Absolute Lymphocytes 2.34    1.2-3.4  K/uL


 


Monocytes # (Manual) 0.98    0.11-0.59  K/uL


 


Eosinophils # (Manual) 1.17    0-0.5  K/uL


 


Myelocytes # 0.19    0-0  K/uL


 


Activated Partial


Thromboplast Time > 300.0


  


  


  


  21.0-31.0


SECONDS


 


Partial Thromboplastin Ratio > 11.0     


 


Sodium Level 139    136-145  mmol/L


 


Potassium Level 3.5    3.5-5.1  mmol/L


 


Chloride Level 106      mmol/L


 


Carbon Dioxide Level 31    21-32  mmol/L


 


Anion Gap 2.0    3-11  mmol/L


 


Blood Urea Nitrogen 10    7-18  mg/dl


 


Creatinine


  1.10


  


  


  


  0.60-1.40


mg/dl


 


Est Creatinine Clear Calc


Drug Dose 100.7


  


  


  


   ml/min


 


 


Estimated GFR (


American) 101.0


  


  


  


   


 


 


Estimated GFR (Non-


American 87.1


  


  


  


   


 


 


BUN/Creatinine Ratio 9.1    10-20  


 


Random Glucose 87    70-99  mg/dl


 


Lactic Acid Level 0.8    0.4-2.0  mmol/L


 


Calcium Level 8.6    8.5-10.1  mg/dl


 


Phosphorus Level 4.1    2.5-4.9  mg/dl


 


Magnesium Level 2.2    1.8-2.4  mg/dl


 


Total Bilirubin 0.6    0.2-1  mg/dl


 


Aspartate Amino Transf


(AST/SGOT) 17


  


  


  


  15-37  U/L


 


 


Alanine Aminotransferase


(ALT/SGPT) 22


  


  


  


  12-78  U/L


 


 


Alkaline Phosphatase 147      U/L


 


Troponin I < 0.015    0-0.045  ng/ml


 


Total Protein 6.7    6.4-8.2  gm/dl


 


Albumin 3.2    3.4-5.0  gm/dl


 


Globulin 3.5    2.5-4.0  gm/dl


 


Albumin/Globulin Ratio 0.9    0.9-2  








Microbiology Results


7/27/17 Blood Culture, Received


          Pending


7/27/17 Blood Culture, Received


          Pending


7/27/17 MRSA DNA Surveillance Screen - Final, Complete


          Specimen Negative for MRSA by DNA Probe





Diagnostic Radiology


CTA Thorax (7/27/17)


   No Acute PE noted


   Near complete resolution of the emboli in the LLL


   Decreased emboli burden in the Lingula


   Small  moderate right sided pleural effusion


   Opacification of the RML & RLL with associated bronchiectasis


   GGO





EKG


NSR, rate: 76, with signs of LVH





Impression


Assessment and Plan


54-year-old male with hypercoagulable background admitted with a recent 

pulmonary emboli, possible pneumonia and chest pain:





#1 Chest pain: At this time I believe the most likely etiology of the patient's 

chest pain is pleurisy. The etiology could be from previous pulmonary emboli 

causing localized lung infarction and/or new or possibly new pneumonia.  I will 

start the patient on Toradol.





#2 Pulmonary Embolism: There are no definitive evaluations of the patient with 

hypercoagulable state such as sickle cell trait or lupus anticoagulant positive 

studies. This shows no definitive literature suggesting Xarelto is an 

appropriate treatment I do suggest we initiate Lovenox at this time and start 

Coumadin. As the patient has a strong family history of venous thromboembolism'

s as well as medical characteristics/diseases such as sickle cell trait and 

positive lupus anticoagulant require lifetime anticoagulation. I suggest we 

initiate therapy and then consult the admitting/anticoagulation clinic.

## 2017-07-28 NOTE — ECHOCARDIOGRAM REPORT
*NOTICE TO RECEIVING PARTY AGENCY**  This information is strictly Confidential and protected under 
[place] law.  [place] law prohibits you from making any further disclosure of this 
information unless further disclosure is expressly permitted by the written consent of the person to 
whom it pertains or is authorized by law.  A general authorization for the release of medical or 
other information is not sufficient for this purpose.  Hospital accepts no responsibility if the 
information is made available to any other person, INCLUDING THE PATIENT.



Interpretation Summary

  *  Name: MCKAYLA RYDER II  Study Date: 2017 01:15 PM  BP: 126/72 mmHg

  *  MRN: [id number]  Patient Location: [facility]\S\S241\S\1  HR: 80

  *  : 1983 (M/d/yyyy)  Gender: Male  Height: 75 in

  *  Age: 34 yrs  Ethnicity: AA  Weight: 186 lb

  *  Ordering Physician: María Sheridan

  *  Referring Physician: Melanie Altamirano

  *  Performed By: FARRAH Middleton RCS

  *  Accession# [id number]  Account# [id number]

  *  Reason For Study: Endocarditis

  *  BSA: 2.1 m2

  *  -- Conclusions --

  *  1. Normal LV size and wall thickness.

  *  2. Normal LV systolic funciton.  LVEF 60-65%.  No regional wall motion abnormalities.

  *  3. Normal RV size and function.

  *  4. No significant valvular pathology.  No vegetations noted.

  *  5. Positive bubble study for interatrial shunt.

  *  6. No prior studies for comparison.

Procedure Details

  *  A complete two-dimensional transthoracic echocardiogram was performed (2D, M-mode, Doppler and 
color flow Doppler).

  *  A saline contrast injection was performed to assess for cardiac shunting.

  *  The injection was performed through an intravenous line in the right arm.

  *  The attending nurse who injected the saline contrast was ANA LUISA Randhawa RN.

  *  A total of 10 cc of agitated saline was given.

Left Ventricle

  *  The left ventricle is grossly normal size.

  *  There is normal left ventricular wall thickness.

  *  Ejection Fraction = 60-65%.

  *  No regional wall motion abnormalities noted.

Right Ventricle

  *  The right ventricle is grossly normal size.

  *  The right ventricular systolic function is normal as assessed by tricuspid annular plane 
systolic excursion (TAPSE) (normal >1.5 cm).

Atria

  *  The left atrial size is normal.

  *  Right atrial size is normal.

  *  Injection of contrast documented an interatrial shunt.

Mitral Valve

  *  The mitral valve is grossly normal.

  *  There is no vegetation seen on the mitral valve.

  *  Mitral stenosis is absent.

  *  Significant mitral regurgitation is absent.

Tricuspid Valve

  *  There is no tricuspid valve vegetation.

  *  There is trace tricuspid regurgitation.

  *  Right ventricular systolic pressure is normal.

Aortic Valve

  *  The aortic valve opens well.

  *  The aortic valve is trileaflet.

  *  There is no aortic valvular vegetation.

  *  No hemodynamically significant valvular aortic stenosis.

  *  Trace aortic regurgitation.

Pulmonic Valve

  *  The pulmonary valve is inadequately visualized, but the Doppler data is adequate for 
interpretation.

  *  Pulmonic stenosis is absent.

  *  Trace pulmonic valvular regurgitation.

Great Vessels

  *  The aortic root and proximal ascending aorta are normal sized.

Pericardium/Pleural

  *  There is no pericardial effusion.

Great Vessels

  *  Normal inferior vena cava size and collapsability with sniff indicates a normal right atrial 
pressure of 3 mmHg



MMode 2D Measurements and Calculations

IVSd 0.99 cm

IVSs 1.3 cm



LVIDd 4.8 cm

LVIDs 3.2 cm

LVPWd 0.92 cm

LVPWs 1.3 cm



IVS/LVPW 1.1 

FS 31.7 %

EDV(Teich) 105.4 ml

ESV(Teich) 42.5 ml

EF(Teich) 59.7 %



EDV(cubed) 107.8 ml

ESV(cubed) 34.3 ml

EF(cubed) 68.2 %

% IVS thick 35.1 %

% LVPW thick 40.9 %





LV mass(C)d 157.0 grams

LV mass(C)dI 73.8 grams/m\S\2

LV mass(C)s 140.6 grams

LV mass(C)sI 66.1 grams/m\S\2



CO(Teich) 4.8 l/min

CI(Teich) 2.3 l/min/m\S\2

SV(Teich) 62.9 ml

SI(Teich) 29.6 ml/m\S\2

CO(cubed) 5.7 l/min

CI(cubed) 2.7 l/min/m\S\2

SV(cubed) 73.5 ml

SI(cubed) 34.6 ml/m\S\2



Ao root diam 3.2 cm

Ao root area 8.1 cm\S\2

ACS 2.1 cm

LA dimension 3.5 cm



asc Aorta Diam 2.6 cm





LA/Ao 1.1 



LVAd ap4 32.4 cm\S\2

LVLd ap4 8.4 cm

EDV(MOD-sp4) 104.0 ml

LVAs ap4 16.5 cm\S\2

LVLs ap4 7.1 cm

ESV(MOD-sp4) 33.0 ml

EF(MOD-sp4) 68.3 %



LVAd ap2 35.1 cm\S\2

LVLd ap2 8.9 cm

EDV(MOD-sp2) 119.0 ml

LVAs ap2 18.9 cm\S\2

LVLs ap2 7.1 cm

ESV(MOD-sp2) 46.0 ml

EF(MOD-sp2) 61.3 %



CO(MOD-sp4) 5.5 l/min

CI(MOD-sp4) 2.6 l/min/m\S\2

SV(MOD-sp4) 71.0 ml

SI(MOD-sp4) 33.4 ml/m\S\2





CO(MOD-sp2) 5.6 l/min

CI(MOD-sp2) 2.6 l/min/m\S\2

SV(MOD-sp2) 73.0 ml

SI(MOD-sp2) 34.3 ml/m\S\2













Doppler Measurements and Calculations

MV E max adam 96.7 cm/sec

MV A max adam 65.6 cm/sec



MV E/A 1.5 



MV P1/2t max adam 121.2 cm/sec

MV P1/2t 83.1 msec

MVA(P1/2t) 2.6 cm\S\2

MV dec slope 427.1 cm/sec\S\2

MV dec time 0.20 sec



Ao V2 max 132.7 cm/sec

Ao max PG 7.0 mmHg

Ao max PG (full) 1.0 mmHg





LV V1 max PG 6.0 mmHg



LV V1 max 122.9 cm/sec



PA V2 max 98.5 cm/sec

PA max PG 3.9 mmHg



PI max adam 201.5 cm/sec

PI max PG 16.2 mmHg

PI dec slope 190.7 cm/sec\S\2

PI P1/2t 309.5 msec





TR max adam 272.7 cm/sec

## 2017-07-28 NOTE — MEDICAL CONSULT
Consultation


Date of Consultation:


Jul 28, 2017.


Attending Physician:


Miki Lennon M.D.


Reason for Consultation:


RLL opacity, hx. drug abuse


History of Present Illness


Patient is a 34-year-old male who presented to the  hospital for admission 

under the recommendation of Dr. Hudson the in regards to right-sided chest 

pain.  The patient has a complicated past medical history including sickle cell 

trait, positive lupus anticoagulant, history of drug abuse, PTSD, and recently 

was diagnosed with a PE and placed on Xarelto.  The patient states he had been 

experiencing this right-sided chest pain for multiple days, but otherwise has 

felt well.  He feels the pain is worse with a deep breath, coughing, and 

movement.  He has had a very mild, nonproductive cough starting yesterday.  The 

patient did have a CTA of the chest upon admission, which showed near complete 

resolution of prior PE.  It also showed small to moderate right pleural 

effusion with multi focal consolidative opacities of the right middle and lower 

lobe suggesting pneumonia.  The patient was placed on IV vancomycin and Zosyn.  

Blood cultures are pending.  MRSA nasal swab was negative.   white blood cell 

count on admission was 12.51.  ESR was 10. Procalcitonin was 0.19, and C 

reactive protein was 3.42.  I did discuss this patient with Dr. Hamlin, Dr. Johns, and Dr. Lennon.





Past Medical/Surgical History


Medical Problems:


(1) Polysubstance abuse


Status: Acute  





(2) Pulmonary embolism


Status: Acute  





(3) Pulmonary infarction


Status: Acute  





(4) Sleep deprivation


Status: Acute  





Medical Problems:


(1) Chest pain, pleuritic


(2) Pulmonary embolism





Family History





FH: pulmonary embolism


Heart disease


Hypertension


Noncontributory





Social History


Smoking Status:  Current Every Day Smoker


Smokeless Tobacco Use:  No


Alcohol Use:  occasionally


Drug Use:  other


Marital Status:  in relationship


Housing Status:  lives with significant other


Occupation Status:  employed





Allergies


Coded Allergies:  


     Fish (Verified  Allergy, Severe, ANAPHYLAXIS, 7/22/17)





Home Medications





Reported Home Medications








 Medications  Dose


 Route/Sig


 Max Daily Dose Days Date Category


 


 Monodox


  (Doxycycline


 Monohydrate) 100


 Mg Cap  100 Mg


 PO BID


   10 7/22/17 Rx


 


 Roxicodone Ir


  (Oxycodone HCl) 5


 Mg Tab  1-2 Tab


 PO Q4H PRN


    7/22/17 Rx


 


 Xarelto


  (Rivaroxaban) 15


 Mg Tab  15 Mg


 PO BID


    7/22/17 Reported


 


 Atarax


  (Hydroxyzine Hcl)


 50 Mg Tab  50 Mg


 PO DAILY


    7/22/17 Reported


 


 Neurontin


  (Gabapentin) 100


 Mg Cap  200 Mg


 PO TID


    7/22/17 Reported











Current Inpatient Medications





Current Inpatient Medications








 Medications


  (Trade)  Dose


 Ordered  Sig/Jae


 Route  Start Time


 Stop Time Status Last Admin


Dose Admin


 


 Acetaminophen


  (Tylenol Tab)  650 mg  Q4H  PRN


 PO  7/27/17 17:30


 8/26/17 17:29   


 


 


 Al Hydrox/Mg


 Hydrox/Simethicone


  (Maalox Max Susp)  15 ml  Q4H  PRN


 PO  7/27/17 17:30


 8/26/17 17:29   


 


 


 Magnesium


 Hydroxide


  (Milk Of


 Magnesia Susp)  30 ml  Q12H  PRN


 PO  7/27/17 17:30


 8/26/17 17:29   


 


 


 Ondansetron HCl


  (Zofran Inj)  4 mg  Q6H  PRN


 IV  7/27/17 17:30


 8/26/17 17:29   


 


 


 Polyethylene


  (Miralax Powder


 Packet)  17 gm  DAILY  PRN


 PO  7/27/17 17:30


 8/26/17 17:29   


 


 


 Gabapentin


  (Neurontin Cap)  200 mg  TID


 PO  7/27/17 21:00


 8/26/17 20:59  7/28/17 09:08


200 MG


 


 Oxycodone HCl


  (Roxicodone


 Immediate Rel Tab)  5 mg  Q4H  PRN


 PO  7/27/17 17:30


 8/10/17 17:29  7/28/17 06:31


5 MG


 


 Nicotine


  (Nicoderm Cq


 14MG Patch)  1 patch  QAM


 TD  7/27/17 18:00


 8/26/17 17:59  7/28/17 06:32


1 PATCH


 


 Miscellaneous


  (Remove Nicoderm


 Patch)  1 ea  HS


 N/A  7/27/17 21:00


 8/26/17 20:59   


 


 


 Ioversol


  (Optiray 320)  100 ml  UD  PRN


 IV  7/27/17 18:00


 7/31/17 17:59   


 


 


 Piperacillin Sod/


 Tazobactam Sod


  (Consult)  1 ea  UD  PRN


 N/A  7/27/17 18:00


 8/26/17 17:59   


 


 


 Piperacillin Sod/


 Tazobactam Sod


 3.375 gm/Dextrose  115 ml @ 


 28.75 mls/


 hr  Q8H


 IV  7/28/17 02:00


 7/30/17 01:59  7/28/17 09:43


28.75 MLS/HR


 


 Heparin Sodium


  (Porcine) 59787


 unit/Dextrose  500 ml @ 


 28 mls/hr  [id number] PRN


 IV  7/28/17 07:00


 8/27/17 06:59  7/28/17 07:06


28 MLS/HR


 


 Miscellaneous


 Information


  (Standard


 Warfarin Nomogram)  1 ea  DAILY@14


 PO  7/28/17 14:00


 8/27/17 13:59 UNV  


 


 


 Miscellaneous


 Information


  (Standard


 Warfarin Nomogram)  1 ea  DAILY@14


 PO  7/28/17 14:00


 8/27/17 13:59 UNV  


 


 


 Miscellaneous


 Information


  (Standard


 Warfarin Nomogram)  1 ea  DAILY@14


 PO  7/28/17 14:00


 8/27/17 13:59 UNV  


 


 


 Miscellaneous


 Information


  (Low Intensity


 Warfarin Nomogram)  1 ea  DAILY@14


 PO  7/28/17 14:00


 8/27/17 13:59 UNV  


 











Review of Systems


Constitutional:  No fever, No chills, No sweats


Eyes:  No worsening of vision


ENT:  No hearing loss


Respiratory:  + cough (yesterday, mild ), No shortness of breath, No dyspnea on 

exertion


Cardiovascular:  + chest pain (right sided)


Abdomen:  No pain, No nausea, No vomiting


Musculoskeletal:  No joint pain, No muscle pain


Genitourinary - Male:  No hematuria, No dysuria, No urinary frequency


Neurologic:  No numbness/tingling


Integumentary:  No rash, No itch





Physical Exam











  Date Time  Temp Pulse Resp B/P (MAP) Pulse Ox O2 Delivery O2 Flow Rate FiO2


 


7/28/17 11:55 36.5 77 20 136/75 (95) 96 Room Air  


 


7/28/17 08:02 36.7 83 19 122/79 (93) 95 Room Air  


 


7/28/17 07:28      Room Air  


 


7/28/17 04:00      Room Air  


 


7/28/17 03:20 36.4 80 18 126/72 (90) 96 Room Air  


 


7/27/17 23:59      Room Air  


 


7/27/17 23:31 36.8 76 22 132/86 (101) 97 Room Air  


 


7/27/17 20:00      Room Air  


 


7/27/17 19:36 36.9 91 22 117/71 (86) 93 Room Air  


 


7/27/17 17:15 36.7 103 16 146/82 98 Room Air  








General: Patient is awake, alert, cooperative, and in no acute distress. Well 

developed.  Well-nourished.


Skin: Normal appearance, texture, and temperature. No apparent rash or 

ecchymoses. 


HEENT: Normocephalic and atraumatic. Eyes are anicteric and non-erythematous. 

EOMI c PERRLA. Hearing intact and without difficulty. Nose appears normal and 

without drainage. Trachea midline. Thyroid appears normal, and neck is supple. 


Lungs:  No accessory muscle use.  Decreased breath sounds right base.


Heart: Regular rate and rhythm. Normal S1 and S2 heard. No S3/S4, rubs, murmurs

, or gallops appreciated. 


Abdomen: Active bowel sounds heard throughout all 4 quadrants. Abdomen is soft 

and nontender with no organomegally or masses to palpation. 


Extremities: No cyanosis or edema. Gait normal and without difficulty. Freely 

moving extremities during exam.


Neuro: Alert and oriented X3. CN II-XII grossly intact. Sensation and motor 

function grossly intact.


Psych: Mood and affect are slightly labile





Laboratory Results


(CHEST FOR PE) ANGIO WITH





CT DOSE: 497.90 mGycm





HISTORY:  34 years-old Male with acute chest pain and pneumonia. Recently


diagnosed acute pulmonary embolus.





TECHNIQUE: Multiple CTA images of the chest were obtained after the intravenous


administration of 111ml Optiray 320.  Coronal and sagittal MIPS were obtained


from the axial data set and were submitted for review.  A dose lowering


technique was utilized adhering to the principles of ALARA.








COMPARISON: CT chest 7/14/2017.





FINDINGS: 





CTA: Heart is normal in size. No thoracic aortic aneurysm or dissection. There


is near complete resolution of the previously noted emboli of the left lower


lobe. There is linear filling defect seen within a lingular lobar branch on


image 182 suggesting residual chronic thrombus. No new pulmonary emboli are


identified.





CT CHEST: There is a small-to-moderate right pleural effusion. Multifocal


consolidative opacities involve the right middle and right lower lobes. Ground


glass opacities involve the right upper lobe. The left lung is generally clear.


No obstructing bronchial lesion is seen. The upper abdominal structures are


within normal limits. The soft tissues are unremarkable. The bones appear


intact.








IMPRESSION:  


1. Near complete resolution of the previously noted pulmonary emboli of the left


lower lobe. Likely minimal residual thrombus is seen within a lingular lobar


branch. No acute occlusive pulmonary emboli are identified.


2. Small to moderate right pleural effusion with multifocal consolidative


opacities of the right middle and lower lobes suggest pneumonia with associated


bronchitis.


3. Groundglass opacities of the upper lobes suggest associated atelectasis.








Item Value  Date Time


 


MRSA DNA Surveillance Screen - Final Complete 7/27/17 2300





Nasal Specimen Negative for MRSA by DNA Probe 


 


Blood Culture Received 7/27/17 1905





Blood Pending 


 


Blood Culture Received 7/27/17 1848





Blood Pending 











Last 24 Hours








Test


  7/27/17


18:09 7/27/17


18:48 7/28/17


00:25 7/28/17


01:45


 


Creatine Kinase MB Ratio     


 


White Blood Count  12.51 K/uL   


 


Red Blood Count  4.33 M/uL   


 


Hemoglobin  13.1 g/dL   


 


Hematocrit  37.3 %   


 


Mean Corpuscular Volume  86.1 fL   


 


Mean Corpuscular Hemoglobin  30.3 pg   


 


Mean Corpuscular Hemoglobin


Concent 


  35.1 g/dl 


  


  


 


 


Platelet Count  309 K/uL   


 


Mean Platelet Volume  9.0 fL   


 


Neutrophils (%) (Auto)  56.0 %   


 


Lymphocytes (%) (Auto)  20.5 %   


 


Monocytes (%) (Auto)  10.4 %   


 


Eosinophils (%) (Auto)  12.2 %   


 


Basophils (%) (Auto)  0.2 %   


 


Neutrophils # (Auto)  6.99 K/uL   


 


Lymphocytes # (Auto)  2.57 K/uL   


 


Monocytes # (Auto)  1.30 K/uL   


 


Eosinophils # (Auto)  1.53 K/uL   


 


Basophils # (Auto)  0.03 K/uL   


 


RDW Standard Deviation  35.1 fL   


 


RDW Coefficient of Variation  11.2 %   


 


Immature Granulocyte % (Auto)  0.7 %   


 


Immature Granulocyte # (Auto)  0.09 K/uL   


 


Erythrocyte Sedimentation Rate  10 mm/hr   


 


Prothrombin Time  11.3 SECONDS   


 


Prothromb Time International


Ratio 


  1.1 


  


  


 


 


Activated Partial


Thromboplast Time 


  35.7 SECONDS 


  


  


 


 


Partial Thromboplastin Ratio  1.4   


 


Sodium Level  140 mmol/L   


 


Potassium Level  3.7 mmol/L   


 


Chloride Level  107 mmol/L   


 


Carbon Dioxide Level  30 mmol/L   


 


Anion Gap  3.0 mmol/L   


 


Blood Urea Nitrogen  12 mg/dl   


 


Creatinine  1.20 mg/dl   


 


Est Creatinine Clear Calc


Drug Dose 


  92.3 ml/min 


  


  


 


 


Estimated GFR (


American) 


  90.9 


  


  


 


 


Estimated GFR (Non-


American 


  78.4 


  


  


 


 


BUN/Creatinine Ratio  9.9   


 


Random Glucose  98 mg/dl   


 


Calcium Level  8.7 mg/dl   


 


Total Bilirubin  0.4 mg/dl   


 


Aspartate Amino Transf


(AST/SGOT) 


  18 U/L 


  


  


 


 


Alanine Aminotransferase


(ALT/SGPT) 


  23 U/L 


  


  


 


 


Alkaline Phosphatase  152 U/L   


 


Creatine Kinase MB  1.6 ng/ml   


 


Troponin I  < 0.015 ng/ml  < 0.015 ng/ml  


 


C-Reactive Protein  3.42 mg/dl   


 


Total Protein  7.0 gm/dl   


 


Albumin  3.3 gm/dl   


 


Globulin  3.7 gm/dl   


 


Albumin/Globulin Ratio  0.9   


 


HIV (1&2) Ab and P24 Ag, 4th


Gener 


  NEG 


  


  


 


 


Procalcitonin   0.19 ng/ml  


 


Urine Color    YELLOW 


 


Urine Appearance    CLEAR 


 


Urine pH    5.0 


 


Urine Specific Gravity    > 1.045 


 


Urine Protein    NEG 


 


Urine Glucose (UA)    NEG 


 


Urine Ketones    NEG 


 


Urine Occult Blood    NEG 


 


Urine Nitrite    NEG 


 


Urine Bilirubin    NEG 


 


Urine Urobilinogen    NEG 


 


Urine Leukocyte Esterase    NEG 


 


Urine WBC (Auto)    1-5 /hpf 


 


Urine RBC (Auto)    0-4 /hpf 


 


Urine Hyaline Casts (Auto)    0 /lpf 


 


Urine Epithelial Cells (Auto)    0-5 /lpf 


 


Urine Bacteria (Auto)    NEG 


 


Test


  7/28/17


07:10 


  


  


 


 


White Blood Count 11.21 K/uL    


 


Red Blood Count 4.34 M/uL    


 


Hemoglobin 13.1 g/dL    


 


Hematocrit 37.6 %    


 


Mean Corpuscular Volume 86.6 fL    


 


Mean Corpuscular Hemoglobin 30.2 pg    


 


Mean Corpuscular Hemoglobin


Concent 34.8 g/dl 


  


  


  


 


 


Platelet Count 294 K/uL    


 


Mean Platelet Volume 8.8 fL    


 


RDW Standard Deviation 35.4 fL    


 


RDW Coefficient of Variation 11.2 %    


 


Neutrophils % (Manual) 58.3 %    


 


Lymphocytes % (Manual) 20.9 %    


 


Monocytes % (Manual) 8.7 %    


 


Eosinophils % (Manual) 10.4 %    


 


Myelocytes % 1.7 %    


 


Neutrophils # (Manual) 6.54 K/uL    


 


Total Absolute Neutrophils 6.54 K/uL    


 


Lymphocytes # (Manual) 2.34 K/uL    


 


Total Absolute Lymphocytes 2.34 K/uL    


 


Monocytes # (Manual) 0.98 K/uL    


 


Eosinophils # (Manual) 1.17 K/uL    


 


Myelocytes # 0.19 K/uL    


 


Activated Partial


Thromboplast Time > 300.0


SECONDS 


  


  


 


 


Partial Thromboplastin Ratio > 11.0    


 


Sodium Level 139 mmol/L    


 


Potassium Level 3.5 mmol/L    


 


Chloride Level 106 mmol/L    


 


Carbon Dioxide Level 31 mmol/L    


 


Anion Gap 2.0 mmol/L    


 


Blood Urea Nitrogen 10 mg/dl    


 


Creatinine 1.10 mg/dl    


 


Est Creatinine Clear Calc


Drug Dose 100.7 ml/min 


  


  


  


 


 


Estimated GFR (


American) 101.0 


  


  


  


 


 


Estimated GFR (Non-


American 87.1 


  


  


  


 


 


BUN/Creatinine Ratio 9.1    


 


Random Glucose 87 mg/dl    


 


Lactic Acid Level 0.8 mmol/L    


 


Calcium Level 8.6 mg/dl    


 


Phosphorus Level 4.1 mg/dl    


 


Magnesium Level 2.2 mg/dl    


 


Total Bilirubin 0.6 mg/dl    


 


Aspartate Amino Transf


(AST/SGOT) 17 U/L 


  


  


  


 


 


Alanine Aminotransferase


(ALT/SGPT) 22 U/L 


  


  


  


 


 


Alkaline Phosphatase 147 U/L    


 


Troponin I < 0.015 ng/ml    


 


Total Protein 6.7 gm/dl    


 


Albumin 3.2 gm/dl    


 


Globulin 3.5 gm/dl    


 


Albumin/Globulin Ratio 0.9    











Assessment & Plan


Patient with chest pain, probable pleuritic in nature, and possible new 

pneumonia of the RML and RLL. He is currently on IV Vancomycin and Zosyn. MRSA 

nasal swab was negative. Will D/C Vancomycin. Feel that Zosyn is appropriate 

pending further workup/improvement. He may ultimately be OK to transition to PO 

Augmentin if he continues to improve to complete 7-10 days. We will follow. 








PROVIDER ADDENDUM:





Patient examined and reviewed with BETH. Agree with above assessment.

## 2017-07-29 NOTE — PROGRESS NOTE
Subjective


Date of Service:


Jul 29, 2017.


Subjective


this pt is doing well and his pain is improving, understands that he will be on 

coumadin





Problem List


Medical Problems:


(1) Polysubstance abuse


Status: Acute  





(2) Pulmonary embolism


Status: Acute  





(3) Pulmonary infarction


Status: Acute  





(4) Sleep deprivation


Status: Acute  











Review of Systems


Constitutional:  + fever, + chills, + weakness


Respiratory:  + cough, + shortness of breath, + dyspnea on exertion


Abdomen:  + pain, + nausea, + vomiting, + diarrhea


Musculoskeletal:  No joint pain, No muscle pain


Male :  No dysuria, No urinary frequency, No incontinence


Psychiatric:  No depression symptoms, No anhedonism





Objective


Vital Signs











  Date Time  Temp Pulse Resp B/P (MAP) Pulse Ox O2 Delivery O2 Flow Rate FiO2


 


7/29/17 16:22 36.3 73 18 149/88 (108) 96 Room Air  


 


7/29/17 12:50 36.7 80 18  97   


 


7/29/17 11:15 36.7 80 18 121/72 (88) 97 Room Air  


 


7/29/17 08:00      Room Air  


 


7/29/17 07:57 37.0 65 18 115/68 (84) 96 Room Air  


 


7/29/17 04:09 36.5 70 22 130/80 (97) 96 Room Air  


 


7/29/17 04:00      Room Air  


 


7/29/17 00:00      Room Air  


 


7/28/17 23:39 37.1 73 22 123/71 (88) 96 Room Air  


 


7/28/17 20:00      Room Air  


 


7/28/17 19:34 36.9 76 19 128/77 (94) 97 Room Air  











Physical Exam


General Appearance:  WD/WN, + mild distress


Neck:  supple, no JVD


Respiratory/Chest:  chest non-tender, lungs clear, normal breath sounds


Cardiovascular:  regular rate, rhythm, no murmur


Abdomen:  normal bowel sounds, non tender, soft


Extremities:  no pedal edema, no calf tenderness


Neurologic/Psychiatric:  alert, oriented x 3





Laboratory Results





Last 24 Hours








Test


  7/29/17


03:45


 


White Blood Count 10.92 K/uL 


 


Red Blood Count 3.95 M/uL 


 


Hemoglobin 12.0 g/dL 


 


Hematocrit 34.4 % 


 


Mean Corpuscular Volume 87.1 fL 


 


Mean Corpuscular Hemoglobin 30.4 pg 


 


Mean Corpuscular Hemoglobin


Concent 34.9 g/dl 


 


 


RDW Standard Deviation 36.0 fL 


 


RDW Coefficient of Variation 11.2 % 


 


Platelet Count 296 K/uL 


 


Mean Platelet Volume 9.6 fL 


 


Prothrombin Time 10.4 SECONDS 


 


Prothromb Time International


Ratio 1.0 


 


 


Activated Partial


Thromboplast Time 61.8 SECONDS 


 


 


Partial Thromboplastin Ratio 2.4 


 


Sodium Level 140 mmol/L 


 


Potassium Level 4.1 mmol/L 


 


Chloride Level 106 mmol/L 


 


Carbon Dioxide Level 31 mmol/L 


 


Anion Gap 3.0 mmol/L 


 


Blood Urea Nitrogen 19 mg/dl 


 


Creatinine 1.20 mg/dl 


 


Est Creatinine Clear Calc


Drug Dose 92.3 ml/min 


 


 


Estimated GFR (


American) 90.9 


 


 


Estimated GFR (Non-


American 78.4 


 


 


BUN/Creatinine Ratio 15.9 


 


Random Glucose 103 mg/dl 


 


Calcium Level 8.7 mg/dl 











Assessment and Plan


34 M with chest pain recent PE, concern for pneumonia,  with PMHx of PE, lupus 

anticoagulant positive, sickle-cell trait, h/o drug abuse, tobacco abuse, 

depression/anxiety/PTSD, 





chest pain- ECHO  endocarditis 


-Discontinue Xarelto and begin IV Heparin, given hypercoagulable lupus 

anticoagulant positive and sickle cell trait,   xarelto will not be best choice 

and pulmonary med feels best treatment will be coumadin, this was also 

discussed with coumadin clinic





- Broad spectrum IV antibiotics w/ IV Zosyn consider transition to augmentin


- Oxycodone and Tylenol PRN for pain control  


- Dr. Hamlin no plans for thoracentesis 





Anxiety/depression, PTSD, explosive disorder: Continue Gabapentin 100 mg TID 





h/o drug abuse, counselled on stopping





h/o tobacco abuse- 1/2-1 pack per day: Nicotine patch wants to go outside 

promises not to smoke





DVT Prophylaxis: IV Heparin 





Code Status: LEVEL I, FULL





Dispo: From home-  consulted

## 2017-07-29 NOTE — PULMONOLOGY PROGRESS NOTE
Pulmonary Progress Note


Date of Service


Jul 29, 2017.





Attending


Dr. Hamlin





Subjective


The patient notes overall improvement in his chest pain





Objective


Patient is sitting up in bed able to easily finish our conversation was no 

signs of increased work of breathing or splinting at this time.





Vital signs: Patient currently in room air


Respiratory: Rhonchi appreciated in the right hemithorax lower quadrants 

predominant


Cardiac: S1-S2 regular rate and rhythm next and abdomen: Positive bowel sounds 

soft nontender





Assessment & Plan


34-year-old gentleman admitted for uncontrollable chest pain with history of 

pulmonary embolism:





#1 chest pain: Etiology of her patient's chest pain is most likely pleurisy/

pleuritic in nature. At this time it is improving he will continue Toradol.





#2 pulmonary embolism: As the patient does have a hypercoagulable underlying 

disorder and no current literature suggests its role to his appropriate 

treatment we have initiated Lovenox and we are bridging to Coumadin. Once the 

patient's pain is well-controlled and he is properly anticoagulated he can 

follow-up in the pulmonary Department and anticoagulation clinic.





Data


Medications:





Current Inpatient Medications








 Medications


  (Trade)  Dose


 Ordered  Sig/Jae


 Route  Start Time


 Stop Time Status Last Admin


Dose Admin


 


 Acetaminophen


  (Tylenol Tab)  650 mg  Q4H  PRN


 PO  7/27/17 17:30


 8/26/17 17:29   


 


 


 Al Hydrox/Mg


 Hydrox/Simethicone


  (Maalox Max Susp)  15 ml  Q4H  PRN


 PO  7/27/17 17:30


 8/26/17 17:29   


 


 


 Magnesium


 Hydroxide


  (Milk Of


 Magnesia Susp)  30 ml  Q12H  PRN


 PO  7/27/17 17:30


 8/26/17 17:29   


 


 


 Ondansetron HCl


  (Zofran Inj)  4 mg  Q6H  PRN


 IV  7/27/17 17:30


 8/26/17 17:29   


 


 


 Polyethylene


  (Miralax Powder


 Packet)  17 gm  DAILY  PRN


 PO  7/27/17 17:30


 8/26/17 17:29   


 


 


 Gabapentin


  (Neurontin Cap)  200 mg  TID


 PO  7/27/17 21:00


 8/26/17 20:59  7/29/17 09:10


200 MG


 


 Oxycodone HCl


  (Roxicodone


 Immediate Rel Tab)  5 mg  Q4H  PRN


 PO  7/27/17 17:30


 8/10/17 17:29  7/28/17 06:31


5 MG


 


 Nicotine


  (Nicoderm Cq


 14MG Patch)  1 patch  QAM


 TD  7/27/17 18:00


 8/26/17 17:59  7/29/17 09:11


1 PATCH


 


 Miscellaneous


  (Remove Nicoderm


 Patch)  1 ea  HS


 N/A  7/27/17 21:00


 8/26/17 20:59  7/28/17 20:42


1 EA


 


 Ioversol


  (Optiray 320)  100 ml  UD  PRN


 IV  7/27/17 18:00


 7/31/17 17:59   


 


 


 Piperacillin Sod/


 Tazobactam Sod


  (Consult)  1 ea  UD  PRN


 N/A  7/27/17 18:00


 8/26/17 17:59   


 


 


 Piperacillin Sod/


 Tazobactam Sod


 3.375 gm/Dextrose  115 ml @ 


 28.75 mls/


 hr  Q8H


 IV  7/28/17 02:00


 7/30/17 01:59  7/29/17 10:29


28.75 MLS/HR


 


 Acetaminophen 650


 mg/Empty Bag  65 ml @ 


 260 mls/hr  Q6H  PRN


 IV  7/28/17 12:45


 8/27/17 12:44   


 


 


 Celecoxib


  (CeleBREX CAP)  100 mg  BID


 PO  7/28/17 12:45


 8/27/17 12:44  7/29/17 09:10


100 MG


 


 Tramadol HCl


  (Ultram Tab)  50 mg  Q4H  PRN


 PO  7/28/17 12:45


 8/27/17 12:44  7/29/17 10:58


50 MG


 


 Warfarin Sodium


  (Coumadin Tab)  5 mg  DAILY@16


 PO  7/28/17 16:00


 8/27/17 15:59  7/28/17 16:48


5 MG


 


 Heparin Sodium/


 Dextrose  500 ml @ 


 28 mls/hr  [id number] PRN


 IV  7/29/17 00:15


 8/28/17 00:14  7/29/17 00:50


28 MLS/HR


 


 Vancomycin HCl


  (Consult)  1 ea  UD  PRN


 N/A  7/29/17 06:45


 8/28/17 06:44   


 


 


 Vancomycin HCl


 1350 mg/Sodium


 Chloride  277 ml @ 


 125 mls/hr  Q10H


 IV  7/29/17 14:00


 8/12/17 13:59   


 








Vital Signs:











  Date Time  Temp Pulse Resp B/P (MAP) Pulse Ox O2 Delivery O2 Flow Rate FiO2


 


7/29/17 12:50 36.7 80 18  97   


 


7/29/17 11:15 36.7 80 18 121/72 (88) 97 Room Air  


 


7/29/17 08:00      Room Air  


 


7/29/17 07:57 37.0 65 18 115/68 (84) 96 Room Air  


 


7/29/17 04:09 36.5 70 22 130/80 (97) 96 Room Air  


 


7/29/17 04:00      Room Air  


 


7/29/17 00:00      Room Air  


 


7/28/17 23:39 37.1 73 22 123/71 (88) 96 Room Air  


 


7/28/17 20:00      Room Air  


 


7/28/17 19:34 36.9 76 19 128/77 (94) 97 Room Air  


 


7/28/17 16:00      Room Air  


 


7/28/17 15:32 36.7 72 20 135/85 (102) 97 Room Air  








Laboratory Results:





Last 24 Hours








Test


  7/28/17


15:45 7/29/17


03:45


 


Activated Partial


Thromboplast Time 57.5 SECONDS 


  61.8 SECONDS 


 


 


Partial Thromboplastin Ratio 2.2  2.4 


 


White Blood Count  10.92 K/uL 


 


Red Blood Count  3.95 M/uL 


 


Hemoglobin  12.0 g/dL 


 


Hematocrit  34.4 % 


 


Mean Corpuscular Volume  87.1 fL 


 


Mean Corpuscular Hemoglobin  30.4 pg 


 


Mean Corpuscular Hemoglobin


Concent 


  34.9 g/dl 


 


 


RDW Standard Deviation  36.0 fL 


 


RDW Coefficient of Variation  11.2 % 


 


Platelet Count  296 K/uL 


 


Mean Platelet Volume  9.6 fL 


 


Prothrombin Time  10.4 SECONDS 


 


Prothromb Time International


Ratio 


  1.0 


 


 


Sodium Level  140 mmol/L 


 


Potassium Level  4.1 mmol/L 


 


Chloride Level  106 mmol/L 


 


Carbon Dioxide Level  31 mmol/L 


 


Anion Gap  3.0 mmol/L 


 


Blood Urea Nitrogen  19 mg/dl 


 


Creatinine  1.20 mg/dl 


 


Est Creatinine Clear Calc


Drug Dose 


  92.3 ml/min 


 


 


Estimated GFR (


American) 


  90.9 


 


 


Estimated GFR (Non-


American 


  78.4 


 


 


BUN/Creatinine Ratio  15.9 


 


Random Glucose  103 mg/dl 


 


Calcium Level  8.7 mg/dl

## 2017-07-30 NOTE — PROGRESS NOTE
Subjective


Date of Service:


Jul 30, 2017.


Subjective


pt continues to improve with pain and deep breathing.  inr is stubborn to rise 

up.





Problem List


Medical Problems:


(1) Polysubstance abuse


Status: Acute  





(2) Pulmonary embolism


Status: Acute  





(3) Pulmonary infarction


Status: Acute  





(4) Sleep deprivation


Status: Acute  











Review of Systems


Constitutional:  No fever, No chills


Respiratory:  No cough, No shortness of breath, No dyspnea on exertion


Cardiac:  + chest pain, No orthopnea, No edema


Abdomen:  No pain, No nausea


Psychiatric:  No depression symptoms, No anhedonism





Objective


Vital Signs











  Date Time  Temp Pulse Resp B/P (MAP) Pulse Ox O2 Delivery O2 Flow Rate FiO2


 


7/30/17 08:28      Room Air  


 


7/30/17 07:12 36.9 84 18 144/91 (108) 96 Room Air  


 


7/30/17 00:00      Room Air  


 


7/29/17 23:07 36.4 79 18 162/88 (112) 98 Room Air  


 


7/29/17 20:00      Room Air  


 


7/29/17 16:22 36.3 73 18 149/88 (108) 96 Room Air  


 


7/29/17 16:00      Room Air  











Physical Exam


General Appearance:  WD/WN, + mild distress


Neck:  supple, thyroid normal


Respiratory/Chest:  chest non-tender, lungs clear, normal breath sounds


Cardiovascular:  regular rate, rhythm, no murmur


Abdomen:  normal bowel sounds, non tender, soft


Extremities:  no pedal edema, no calf tenderness


Neurologic/Psychiatric:  alert, oriented x 3





Laboratory Results





Last 24 Hours








Test


  7/30/17


07:07 7/30/17


09:48


 


Prothrombin Time 11.8 SECONDS  


 


Prothromb Time International


Ratio 1.1 


  


 


 


Activated Partial


Thromboplast Time 


  66.9 SECONDS 


 


 


Partial Thromboplastin Ratio  2.6 











Assessment and Plan


34 M with chest pain recent PE, concern for pneumonia,  with PMHx of PE, lupus 

anticoagulant positive, sickle-cell trait, h/o drug abuse, tobacco abuse, 

depression/anxiety/PTSD, 





chest pain felt to be pleuritic chests pain, from recent PE possible pulm 

infarct or even associated with mild pneumonia, ECHO normal except for 

intraatrial shunt


-Discontinue Xarelto and begin IV Heparin, given hypercoagulable lupus 

anticoagulant positive and sickle cell trait,   xarelto will not be best choice 

and pulmonary med feels best treatment will be coumadin, this was also 

discussed with coumadin clinic has gotten 5-10-10


- Broad spectrum IV antibiotics  transition to augmentin


- Oxycodone and Tylenol PRN for pain control  


- Dr. Hamlin no plans for thoracentesis 





Anxiety/depression, PTSD, explosive disorder: Continue Gabapentin 100 mg TID 





h/o drug abuse, counselled on stopping





h/o tobacco abuse- 1/2-1 pack per day: Nicotine patch, has gone for walks 

outside promises not to smoke





DVT Prophylaxis: IV Heparin/ coumadin





Code Status: LEVEL I, FULL





Dispo: From home-  consulted

## 2017-07-30 NOTE — PULMONOLOGY PROGRESS NOTE
Pulmonary Progress Note


Date of Service


Jul 30, 2017.





Attending


Dr. Hamlin





Subjective


Patient doing well today notes dramatic improvement almost completely 

resolution of his pleurisy





Objective


Patient is standing up in the room moving around freely showing no signs of 

respiratory insufficiency





Vital signs: Patient currently in room air


Respiratory: Rhonchi appreciated in the right hemithorax lower quadrants 

predominant


Cardiac: S1-S2 regular rate and rhythm next and abdomen: Positive bowel sounds 

soft nontender





INR: 2.6





Assessment & Plan


34-year-old gentleman admitted for uncontrollable chest pain with history of 

pulmonary embolism:





#1 Chest Pain: I believe the most likely etiology of the patient's chest pain 

was pleurisy which has been well-controlled. At this time the easiest 

medication to control pleurisy as an outpatient is actually ibuprofen 600-800 

mg 3-4 times per day as needed. I did discuss this with the patient also told 

him he cannot be on ibuprofen chronically as this can cause chronic renal as 

well as gastric issues. This should only be used for a 2-3 day window as needed.





#2 Pulmonary Embolism: As the patient does have a hypercoagulable underlying 

disorder and no current literature suggests its role to his appropriate 

treatment we have initiated Lovenox and we are bridging to Coumadin. Once the 

patient's pain is well-controlled and he is properly anticoagulated he can 

follow-up in the pulmonary Department and anticoagulation clinic.





#3: Follow-up: The patient should follow up 2-3 weeks after his discharge with 

the pulmonary Department.





Signoff: At this time the pulmonary team will sign off





Data


Medications:





Current Inpatient Medications








 Medications


  (Trade)  Dose


 Ordered  Sig/Jae


 Route  Start Time


 Stop Time Status Last Admin


Dose Admin


 


 Acetaminophen


  (Tylenol Tab)  650 mg  Q4H  PRN


 PO  7/27/17 17:30


 8/26/17 17:29   


 


 


 Al Hydrox/Mg


 Hydrox/Simethicone


  (Maalox Max Susp)  15 ml  Q4H  PRN


 PO  7/27/17 17:30


 8/26/17 17:29   


 


 


 Magnesium


 Hydroxide


  (Milk Of


 Magnesia Susp)  30 ml  Q12H  PRN


 PO  7/27/17 17:30


 8/26/17 17:29   


 


 


 Ondansetron HCl


  (Zofran Inj)  4 mg  Q6H  PRN


 IV  7/27/17 17:30


 8/26/17 17:29   


 


 


 Polyethylene


  (Miralax Powder


 Packet)  17 gm  DAILY  PRN


 PO  7/27/17 17:30


 8/26/17 17:29   


 


 


 Gabapentin


  (Neurontin Cap)  200 mg  TID


 PO  7/27/17 21:00


 8/26/17 20:59  7/30/17 12:55


200 MG


 


 Oxycodone HCl


  (Roxicodone


 Immediate Rel Tab)  5 mg  Q4H  PRN


 PO  7/27/17 17:30


 8/10/17 17:29  7/29/17 20:32


5 MG


 


 Nicotine


  (Nicoderm Cq


 14MG Patch)  1 patch  QAM


 TD  7/27/17 18:00


 8/26/17 17:59  7/30/17 07:56


1 PATCH


 


 Miscellaneous


  (Remove Nicoderm


 Patch)  1 ea  HS


 N/A  7/27/17 21:00


 8/26/17 20:59  7/28/17 20:42


1 EA


 


 Ioversol


  (Optiray 320)  100 ml  UD  PRN


 IV  7/27/17 18:00


 7/31/17 17:59   


 


 


 Acetaminophen 650


 mg/Empty Bag  65 ml @ 


 260 mls/hr  Q6H  PRN


 IV  7/28/17 12:45


 8/27/17 12:44   


 


 


 Celecoxib


  (CeleBREX CAP)  100 mg  BID


 PO  7/28/17 12:45


 8/27/17 12:44  7/30/17 07:58


100 MG


 


 Tramadol HCl


  (Ultram Tab)  50 mg  Q4H  PRN


 PO  7/28/17 12:45


 8/27/17 12:44  7/29/17 10:58


50 MG


 


 Warfarin Sodium


  (Coumadin Tab)  5 mg  DAILY@16


 PO  7/28/17 16:00


 8/27/17 15:59  7/29/17 16:18


5 MG


 


 Heparin Sodium/


 Dextrose  500 ml @ 


 28 mls/hr  [id number] PRN


 IV  7/29/17 00:15


 8/28/17 00:14  7/30/17 10:59


28 MLS/HR


 


 Amoxicillin/


 Clavulanate


 Potassium


  (Augmentin Tab)  875 mg  BIDM


 PO  7/30/17 11:27


 8/6/17 11:26  7/30/17 12:55


875 MG








Vital Signs:











  Date Time  Temp Pulse Resp B/P (MAP) Pulse Ox O2 Delivery O2 Flow Rate FiO2


 


7/30/17 08:28      Room Air  


 


7/30/17 07:12 36.9 84 18 144/91 (108) 96 Room Air  


 


7/30/17 00:00      Room Air  


 


7/29/17 23:07 36.4 79 18 162/88 (112) 98 Room Air  


 


7/29/17 20:00      Room Air  


 


7/29/17 16:22 36.3 73 18 149/88 (108) 96 Room Air  


 


7/29/17 16:00      Room Air  








Laboratory Results:





Last 24 Hours








Test


  7/30/17


07:07 7/30/17


09:48


 


Prothrombin Time 11.8 SECONDS  


 


Prothromb Time International


Ratio 1.1 


  


 


 


Activated Partial


Thromboplast Time 


  66.9 SECONDS 


 


 


Partial Thromboplastin Ratio  2.6

## 2017-07-31 NOTE — PULMONOLOGY PROGRESS NOTE
Pulmonary Progress Note


Date of Service


Jul 31, 2017.





Attending








Assessment & Plan


Please see follow-up  written at later time.


This note was written in error.





Data


Medications:





Current Inpatient Medications








 Medications


  (Trade)  Dose


 Ordered  Sig/Jae


 Route  Start Time


 Stop Time Status Last Admin


Dose Admin


 


 Acetaminophen


  (Tylenol Tab)  650 mg  Q4H  PRN


 PO  7/27/17 17:30


 8/26/17 17:29   


 


 


 Al Hydrox/Mg


 Hydrox/Simethicone


  (Maalox Max Susp)  15 ml  Q4H  PRN


 PO  7/27/17 17:30


 8/26/17 17:29   


 


 


 Magnesium


 Hydroxide


  (Milk Of


 Magnesia Susp)  30 ml  Q12H  PRN


 PO  7/27/17 17:30


 8/26/17 17:29   


 


 


 Ondansetron HCl


  (Zofran Inj)  4 mg  Q6H  PRN


 IV  7/27/17 17:30


 8/26/17 17:29   


 


 


 Polyethylene


  (Miralax Powder


 Packet)  17 gm  DAILY  PRN


 PO  7/27/17 17:30


 8/26/17 17:29   


 


 


 Gabapentin


  (Neurontin Cap)  200 mg  TID


 PO  7/27/17 21:00


 8/26/17 20:59  7/31/17 13:35


200 MG


 


 Oxycodone HCl


  (Roxicodone


 Immediate Rel Tab)  5 mg  Q4H  PRN


 PO  7/27/17 17:30


 8/10/17 17:29  7/31/17 09:33


5 MG


 


 Nicotine


  (Nicoderm Cq


 14MG Patch)  1 patch  QAM


 TD  7/27/17 18:00


 8/26/17 17:59  7/31/17 09:27


1 PATCH


 


 Miscellaneous


  (Remove Nicoderm


 Patch)  1 ea  HS


 N/A  7/27/17 21:00


 8/26/17 20:59  7/30/17 21:17


1 EA


 


 Ioversol


  (Optiray 320)  100 ml  UD  PRN


 IV  7/27/17 18:00


 7/31/17 17:59   


 


 


 Acetaminophen 650


 mg/Empty Bag  65 ml @ 


 260 mls/hr  Q6H  PRN


 IV  7/28/17 12:45


 8/27/17 12:44   


 


 


 Celecoxib


  (CeleBREX CAP)  100 mg  BID


 PO  7/28/17 12:45


 8/27/17 12:44  7/31/17 09:27


100 MG


 


 Tramadol HCl


  (Ultram Tab)  50 mg  Q4H  PRN


 PO  7/28/17 12:45


 8/27/17 12:44  7/29/17 10:58


50 MG


 


 Heparin Sodium/


 Dextrose  500 ml @ 


 28 mls/hr  [id number] PRN


 IV  7/29/17 00:15


 8/28/17 00:14  7/31/17 05:29


28 MLS/HR


 


 Ampicillin Sodium/


 Sulbactam Sodium


 3000 mg/Sodium


 Chloride  108 ml @ 


 200 mls/hr  Q6H


 IV  7/31/17 12:00


 8/7/17 11:59  7/31/17 13:34


200 MLS/HR


 


 Ampicillin Sodium/


 Sulbactam Sodium


  (Consult)  1 ea  UD  PRN


 N/A  7/31/17 12:45


 8/30/17 12:44   


 








I & O:











24-Hour Column 


 


 8/1/17





 08:00


 


Intake Total 462 ml


 


Balance 462 ml








Vital Signs:











  Date Time  Temp Pulse Resp B/P (MAP) Pulse Ox O2 Delivery O2 Flow Rate FiO2


 


7/31/17 15:33 36.5 80 16 137/78 (97) 97 Room Air  


 


7/31/17 08:00      Room Air  


 


7/31/17 07:20 36.5 83 18 142/93 (109) 97 Room Air  


 


7/31/17 00:05 36.9 101 16 160/94 (116) 97 Room Air  


 


7/31/17 00:00      Room Air  


 


7/30/17 16:00      Room Air  








Laboratory Results:





Last 24 Hours








Test


  7/30/17


16:50 7/31/17


07:18


 


Prothrombin Time  15.3 SECONDS 


 


Prothromb Time International


Ratio 


  1.4 


 


 


Activated Partial


Thromboplast Time 


  63.7 SECONDS 


 


 


Partial Thromboplastin Ratio  2.5

## 2017-07-31 NOTE — PROGRESS NOTE
Subjective


Date of Service:


Jul 31, 2017.


Subjective


Pt evaluation today including:  conversation w/ patient, physical exam, chart 

review, lab review, review of studies, conversation w/ consultant, review of 

inpatient medication list





Problem List


Medical Problems:


(1) Polysubstance abuse


Status: Acute  





(2) Pulmonary embolism


Status: Acute  





(3) Pulmonary infarction


Status: Acute  





(4) Sleep deprivation


Status: Acute  











Review of Systems


Constitutional:  No see HPI, No fever, No chills, No sweats, No weight loss, No 

weakness, No fatigue, No problem reported


Eyes:  No see HPI, No worsening of vision, No eye pain, No redness, No discharge

, No diplopia, No problem reported


ENT:  No see HPI, No hearing loss, No unusual epistaxis, No nasal symptoms, No 

sore throat, No tinnitus, No dental problems, No trouble swallowing, No problem 

reported


Respiratory:  No see HPI, No cough, No sputum, No wheezing, No shortness of 

breath, No dyspnea on exertion, No dyspnea at rest, No hemoptysis, No problem 

reported


Cardiac:  + chest pain, No see HPI, No orthopnea, No PND, No edema, No 

claudication, No palpitations, No problem reported


Abdomen:  No see HPI, No pain, No nausea, No vomiting, No diarrhea, No 

constipation, No GI bleeding, No problem reported


Musculoskeletal:  No see HPI, No joint pain, No muscle pain, No swelling, No 

calf pain, No problem reported


Male :  No see HPI, No dysuria, No urinary frequency, No incontinence, No 

nocturia more than once/night, No slowing stream, No hematuria, No sexual 

dysfunction, No problem reported


Neurologic:  No see HPI, No memory loss, No paralysis, No weakness, No numbness/

tingling, No vertigo, No balance problems, No problem reported


Psychiatric:  No see HPI, No depression symptoms, No anhedonism, No anxiety, No 

insomnia, No substance abuse, No problem reported


Endo:  No see HPI, No fatigue, No excessive thirst, No excessive urination, No 

problem reported


Skin:  No see HPI, No rash, No itch, No new/changing skin lesions, No color 

change, No bleeding, No problem reported





Objective


Vital Signs











  Date Time  Temp Pulse Resp B/P (MAP) Pulse Ox O2 Delivery O2 Flow Rate FiO2


 


7/31/17 08:00      Room Air  


 


7/31/17 07:20 36.5 83 18 142/93 (109) 97 Room Air  


 


7/31/17 00:05 36.9 101 16 160/94 (116) 97 Room Air  


 


7/31/17 00:00      Room Air  


 


7/30/17 16:00      Room Air  


 


7/30/17 15:35 36.4 83 18 152/91 (111) 98 Room Air  











Physical Exam


General Appearance:  WD/WN, no apparent distress


Eyes:  normal inspection, EOMI


ENT:  normal ENT inspection, hearing grossly normal


Neck:  supple


Respiratory/Chest:  chest non-tender, lungs clear, normal breath sounds, no 

respiratory distress, no accessory muscle use


Cardiovascular:  regular rate, rhythm, no edema, no gallop, no JVD, no murmur


Abdomen:  normal bowel sounds, non tender, soft, no organomegaly


Extremities:  normal range of motion, non-tender, normal inspection, no pedal 

edema


Neurologic/Psychiatric:  CNs II-XII nml as tested, no motor/sensory deficits, 

alert, normal mood/affect, oriented x 3


Skin:  normal color, warm/dry, no rash





Laboratory Results





Last 24 Hours








Test


  7/30/17


16:50 7/31/17


07:18


 


Prothrombin Time  15.3 SECONDS 


 


Prothromb Time International


Ratio 


  1.4 


 


 


Activated Partial


Thromboplast Time 


  63.7 SECONDS 


 


 


Partial Thromboplastin Ratio  2.5 











Assessment and Plan





33 y/o male, with PMHx of drug and tobacco abuse, depression, anxiety, PTSD and 

positive SLE recently Dx with left sided PE started on xarelto.


Patient continued to complain of severe right CVA pain. CT abd showed worsening 

effusion and lower lung density (note his PE is on the left side)








Assessment:


RLL density/pleural effusion, likely parapneumonic in nature


actinomyces bacteremia


Recent PE


Hx of drug abuse











Plan:


- consult ID, consult Dr. Hamlin appreciated


- will need PIC line for prolong Abx  (denies any IVDA, in the past he used to 

snore)


- TTE showed no  endocarditis  but was Positive bubble study for interatrial 

shunt., will need ÁNGEL


- continue holding Xarelto and begin IV Heparin tomorrow AM


- Hypercoagulable workup completed- lupus anticoagulant positive 


- Broad spectrum IV antibiotics w/ IV Zosyn and Vancomycin- MRSA swab pending


- Oxycodone and Tylenol PRN for pain control  


- BCx grew actinomyces


- Check UA  


- pending Fungitel / Quantiferone Gold


-HIV test (patient verbally agreed)

## 2017-07-31 NOTE — PULMONOLOGY PROGRESS NOTE
Pulmonary Progress Note


Date of Service


Jul 31, 2017.





Attending


Dr. Griffin





Subjective


Patient seen and examined.  He is still complaining of right pleuritic chest 

pain with deep inspiration.  He denies any fevers, chills, cough, shortness of 

breath.





Objective


Vital signs reviewed and within normal limits. 


Gen.: Sitting in bed, in no acute respiratory distress, speaking in full 

sentences on room air.


Head: Atraumatic, normocephalic


Mouth: No oral thrush, no tonsillar exudates.


Neck: Supple, nontender, no cervical lymphadenopathy


CVS: S1-S2, regular rate and rhythm, no murmurs, rubs or gallops appreciated


Lungs/chest:  good air entry bilaterally, basilar crackles on the right.  

Reproducible chest pain on palpation of right chest wall.


Abdomen: Soft, nontender, bowel sounds positive


Extremities: No edema bilaterally seen on lower extremities, no cyanosis, no 

clubbing





Labs and medications reviewed


Labs 


CoagulationPTT 62.7 INR 1.4


Phosphatidylserine IgG 13


HIV antibody 07/27/2017negative


Urine Legionella from 07/28/2017not detected


QuantiFERON from 07/20/2017negative


Beta-1, 3-D glucan from 07/28/2017pending





Microbiology


Blood culture 07/27/2017Actinomyces


Blood culture 07/27/2017no growth to date


Blood culture 2 07/31/2017pending





TTE 


* -- Conclusions --


* 1. Normal LV size and wall thickness.


* 2. Normal LV systolic funciton.  LVEF 60-65%.  No regional wall motion 

abnormalities.


* 3. Normal RV size and function.


* 4. No significant valvular pathology.  No vegetations noted.


* 5. Positive bubble study for interatrial shunt.


* 6. No prior studies for comparison.





Assessment & Plan





34-year-old male with right-sided pleuritic chest pain as he is being .Patient 

found to have right-sided pleural effusion and is currently being treated for 

left-sided pulmonary embolism.  He is positive for lupus anticoagulant and is 

currently on heparin drip and Coumadin for anticoagulation.  Blood cultures 

positive for actinomyces.  





He was empirically treated with vancomycin and Zosyn, and from 07/27/2017 to 07/ 30/2017.  His clinical status improved and he was switched to Augmentin on 07/30 /2017.  However in light of his new blood cultures used has been switched to 

Unasyn.





HIV, QuantiFERON and urine legionella are negative thus far.  He has been on 

oxycodone, Celebrex, tramadol and Tylenol when necessary for pain control.





As the aggressive nature of actinomyces, agree with treatment with Unasyn.


Recommend repeat blood cultures as well as ÁNGEL to rule out endocarditis.


Will follow-up with Dr. Edwards regarding thoracentesis to evaluate pleural 

fluid.


If this is an actinomyces empyema decortication is warranted.


Discussed the risk and benefits of this procedure with patient and he would 

like time to consider it if needed.


For tobacco use disorder continue with nicotine patch.  Smoking cessation and 

illicit drug cessation advised.  





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 

and to be followed up as outpatient. 


He does not require supplemental at this time oxygen.  


2 L nasal cannula when necessary if needed.  





Patient is emotionally labile and compliance with medical treatment may be 

compromised because of this.


I will continue to follow with you.





Data


Medications:





Current Inpatient Medications








 Medications


  (Trade)  Dose


 Ordered  Sig/Jae


 Route  Start Time


 Stop Time Status Last Admin


Dose Admin


 


 Acetaminophen


  (Tylenol Tab)  650 mg  Q4H  PRN


 PO  7/27/17 17:30


 8/26/17 17:29   


 


 


 Al Hydrox/Mg


 Hydrox/Simethicone


  (Maalox Max Susp)  15 ml  Q4H  PRN


 PO  7/27/17 17:30


 8/26/17 17:29   


 


 


 Magnesium


 Hydroxide


  (Milk Of


 Magnesia Susp)  30 ml  Q12H  PRN


 PO  7/27/17 17:30


 8/26/17 17:29   


 


 


 Ondansetron HCl


  (Zofran Inj)  4 mg  Q6H  PRN


 IV  7/27/17 17:30


 8/26/17 17:29   


 


 


 Polyethylene


  (Miralax Powder


 Packet)  17 gm  DAILY  PRN


 PO  7/27/17 17:30


 8/26/17 17:29   


 


 


 Gabapentin


  (Neurontin Cap)  200 mg  TID


 PO  7/27/17 21:00


 8/26/17 20:59  7/31/17 13:35


200 MG


 


 Oxycodone HCl


  (Roxicodone


 Immediate Rel Tab)  5 mg  Q4H  PRN


 PO  7/27/17 17:30


 8/10/17 17:29  7/31/17 16:49


5 MG


 


 Nicotine


  (Nicoderm Cq


 14MG Patch)  1 patch  QAM


 TD  7/27/17 18:00


 8/26/17 17:59  7/31/17 09:27


1 PATCH


 


 Miscellaneous


  (Remove Nicoderm


 Patch)  1 ea  HS


 N/A  7/27/17 21:00


 8/26/17 20:59  7/30/17 21:17


1 EA


 


 Acetaminophen 650


 mg/Empty Bag  65 ml @ 


 260 mls/hr  Q6H  PRN


 IV  7/28/17 12:45


 8/27/17 12:44   


 


 


 Celecoxib


  (CeleBREX CAP)  100 mg  BID


 PO  7/28/17 12:45


 8/27/17 12:44  7/31/17 09:27


100 MG


 


 Tramadol HCl


  (Ultram Tab)  50 mg  Q4H  PRN


 PO  7/28/17 12:45


 8/27/17 12:44  7/29/17 10:58


50 MG


 


 Heparin Sodium/


 Dextrose  500 ml @ 


 28 mls/hr  [id number] PRN


 IV  7/29/17 00:15


 8/28/17 00:14  7/31/17 05:29


28 MLS/HR


 


 Ampicillin Sodium/


 Sulbactam Sodium


 3000 mg/Sodium


 Chloride  108 ml @ 


 200 mls/hr  Q6H


 IV  7/31/17 12:00


 8/7/17 11:59  7/31/17 18:25


200 MLS/HR


 


 Ampicillin Sodium/


 Sulbactam Sodium


  (Consult)  1 ea  UD  PRN


 N/A  7/31/17 12:45


 8/30/17 12:44   


 


 


 Heparin Sodium


  (Porcine)


  (Heparin 10 Unit/


 ml 5 ml Flush)  5 ml  PRN  PRN


 FLUSH  7/31/17 17:00


 8/30/17 16:59   


 








I & O:











24-Hour Column 


 


 8/1/17





 08:00


 


Intake Total 462 ml


 


Balance 462 ml








Vital Signs:











  Date Time  Temp Pulse Resp B/P (MAP) Pulse Ox O2 Delivery O2 Flow Rate FiO2


 


7/31/17 15:33 36.5 80 16 137/78 (97) 97 Room Air  


 


7/31/17 08:00      Room Air  


 


7/31/17 07:20 36.5 83 18 142/93 (109) 97 Room Air  


 


7/31/17 00:05 36.9 101 16 160/94 (116) 97 Room Air  


 


7/31/17 00:00      Room Air  








Laboratory Results:





Last 24 Hours








Test


  7/31/17


07:18


 


Prothrombin Time 15.3 SECONDS 


 


Prothromb Time International


Ratio 1.4 


 


 


Activated Partial


Thromboplast Time 63.7 SECONDS 


 


 


Partial Thromboplastin Ratio 2.5

## 2017-07-31 NOTE — HOSPITALIST PROGRESS NOTE
Hospitalist Progress Note


Date of Service


Jul 31, 2017.


 (María Sheridan ., BETH)





Subjective


Pt evaluation today including:  conversation w/ patient, physical exam, chart 

review, lab review, review of studies, review of inpatient medication list


Voiding:  no voiding problems, no incontinence


Patient states he is feeling well. 


Continues to have pleuritic chest pain. 





Patient denies any fever, chills, sweats, lightheadedness, dizziness, vision 

changes, palpitations, edema, SOB, wheezing, cough, abdominal pain, nausea, 

vomiting, diarrhea, urinary symptoms, melena, numbness/tingling, weakness, 

muscle/joint pain, anxiety/depression, active bleeding, or new skin 

discoloration/changes. 


 (María Sheridan ., PA-C)





Medications





Current Inpatient Medications








 Medications


  (Trade)  Dose


 Ordered  Sig/Jae


 Route  Start Time


 Stop Time Status Last Admin


Dose Admin


 


 Acetaminophen


  (Tylenol Tab)  650 mg  Q4H  PRN


 PO  7/27/17 17:30


 8/26/17 17:29   


 


 


 Al Hydrox/Mg


 Hydrox/Simethicone


  (Maalox Max Susp)  15 ml  Q4H  PRN


 PO  7/27/17 17:30


 8/26/17 17:29   


 


 


 Magnesium


 Hydroxide


  (Milk Of


 Magnesia Susp)  30 ml  Q12H  PRN


 PO  7/27/17 17:30


 8/26/17 17:29   


 


 


 Ondansetron HCl


  (Zofran Inj)  4 mg  Q6H  PRN


 IV  7/27/17 17:30


 8/26/17 17:29   


 


 


 Polyethylene


  (Miralax Powder


 Packet)  17 gm  DAILY  PRN


 PO  7/27/17 17:30


 8/26/17 17:29   


 


 


 Gabapentin


  (Neurontin Cap)  200 mg  TID


 PO  7/27/17 21:00


 8/26/17 20:59  7/31/17 13:35


200 MG


 


 Oxycodone HCl


  (Roxicodone


 Immediate Rel Tab)  5 mg  Q4H  PRN


 PO  7/27/17 17:30


 8/10/17 17:29  7/31/17 09:33


5 MG


 


 Nicotine


  (Nicoderm Cq


 14MG Patch)  1 patch  QAM


 TD  7/27/17 18:00


 8/26/17 17:59  7/31/17 09:27


1 PATCH


 


 Miscellaneous


  (Remove Nicoderm


 Patch)  1 ea  HS


 N/A  7/27/17 21:00


 8/26/17 20:59  7/30/17 21:17


1 EA


 


 Ioversol


  (Optiray 320)  100 ml  UD  PRN


 IV  7/27/17 18:00


 7/31/17 17:59   


 


 


 Acetaminophen 650


 mg/Empty Bag  65 ml @ 


 260 mls/hr  Q6H  PRN


 IV  7/28/17 12:45


 8/27/17 12:44   


 


 


 Celecoxib


  (CeleBREX CAP)  100 mg  BID


 PO  7/28/17 12:45


 8/27/17 12:44  7/31/17 09:27


100 MG


 


 Tramadol HCl


  (Ultram Tab)  50 mg  Q4H  PRN


 PO  7/28/17 12:45


 8/27/17 12:44  7/29/17 10:58


50 MG


 


 Heparin Sodium/


 Dextrose  500 ml @ 


 28 mls/hr  [id number] PRN


 IV  7/29/17 00:15


 8/28/17 00:14  7/31/17 05:29


28 MLS/HR


 


 Ampicillin Sodium/


 Sulbactam Sodium


 3000 mg/Sodium


 Chloride  108 ml @ 


 200 mls/hr  Q6H


 IV  7/31/17 12:00


 8/7/17 11:59  7/31/17 13:34


200 MLS/HR


 


 Ampicillin Sodium/


 Sulbactam Sodium


  (Consult)  1 ea  UD  PRN


 N/A  7/31/17 12:45


 8/30/17 12:44   


 








 (María Sheridan, PA-C)





Objective


Vital Signs











  Date Time  Temp Pulse Resp B/P (MAP) Pulse Ox O2 Delivery O2 Flow Rate FiO2


 


7/31/17 08:00      Room Air  


 


7/31/17 07:20 36.5 83 18 142/93 (109) 97 Room Air  


 


7/31/17 00:05 36.9 101 16 160/94 (116) 97 Room Air  


 


7/31/17 00:00      Room Air  


 


7/30/17 16:00      Room Air  


 


7/30/17 15:35 36.4 83 18 152/91 (111) 98 Room Air  








 (María Sheridan, PA-C)





Physical Exam


General Appearance:  no apparent distress


Eyes:  normal inspection, PERRL


ENT:  hearing grossly normal


Neck:  supple


Respiratory/Chest:  lungs clear, no respiratory distress, no accessory muscle 

use


Cardiovascular:  regular rate, rhythm


Abdomen:  normal bowel sounds, non tender, soft


Extremities:  no pedal edema, no calf tenderness


Neurologic/Psychiatric:  alert, normal mood/affect, oriented x 3


Skin:  normal color, warm/dry, no rash


 (María Sheridan PA-C)





Laboratory Results





Last 24 Hours








Test


  7/30/17


16:50 7/31/17


07:18


 


Prothrombin Time  15.3 SECONDS 


 


Prothromb Time International


Ratio 


  1.4 


 


 


Activated Partial


Thromboplast Time 


  63.7 SECONDS 


 


 


Partial Thromboplastin Ratio  2.5 








 (María Sheridan PA-C)





Assessment and Plan


Patient is a pleasant 35 y/o male, with PMHx of PE, lupus anticoagulant positive

, sickle-cell trait, h/o drug abuse, tobacco abuse, depression/anxiety/PTSD, 

who was a direct admission from Dr. Hudson's office due to persistent pleuritic 

right-sided CP here for additional workup. 





Persistent pleuritic right-sided CP:


- Admit to tele for cardiac monitoring- no acute events


   -- Transferred to med/surg on 7/29


- Cardiac enzymes- negative 


- CTA reviewed 


- Bilateral lower extremity venous US- no evidence of DVT 


- ECHO to r/o endocarditis


* 1. Normal LV size and wall thickness.


* 2. Normal LV systolic funciton.  LVEF 60-65%.  No regional wall motion 

abnormalities.


* 3. Normal RV size and function.


* 4. No significant valvular pathology.  No vegetations noted.


* 5. Positive bubble study for interatrial shunt.


* 6. No prior studies for comparison. 


- IV Heparin bridging w/ PO Coumadin due to hypercoagulable state


   -- Following INR 


- Hypercoagulable workup completed- lupus anticoagulant positive 


- Broad spectrum IV antibiotics w/ IV Zosyn and Vancomycin- MRSA swab negative 


   -- Transitioned to PO Augmentin on 7/30- d/c'd on 7/31 and transitioned to 

IV Unasyn due to BCx 


- Oxycodone, Celebrex BID, and Tramadol, Tylenol PRN for pain control  


- BCx- growing aerobic actinomycete 


   -- Repeat BCx on 7/31


   -- ID following- begin IV Unasyn 


- UA, HIV, TB, urine legionella- negative 


- CBC, CMP, PT/INR/PTT pending at this time 


- Consult pulmonary, appreciate recommendations


   -- No thoracentesis 


- Consult ID, appreciate recommendations 


- Consult thoracic surgery, appreciate recommendations 





Anxiety/depression, PTSD, explosive disorder: Continue Gabapentin 100 mg TID 





h/o drug abuse- use to snort/smoke Meth: Denies IVDA 





h/o tobacco abuse- 1/2-1 pack per day: Nicotine patch 





DVT Prophylaxis: IV Heparin, Coumadin 





Code Status: LEVEL I, FULL





Dispo: From home-  consulted 


- Will need PICC for antibiotic therapy at discharge 


 (María Sheridan, PA-C)


Addendum added separately 


 (Melanie Altamirano MD)

## 2017-07-31 NOTE — INFECTIOUS DISEASE PROGRESS NT
Progress Note


Date of Service


Jul 31, 2017.





Subjective


Pt evaluation today including:  conversation w/ patient, physical exam, chart 

review, lab review, review of studies, review of inpatient medication list


Patient is feeling poorly this morning. He is very concerned about his lungs. 

He is labile during discussion. The patient did have Aerobic Actinomyces 

growing in 1/2 blood cultures from admission. He was transitioned to PO 

Augmentin. He is tolerating that medication well. He states that the pain in 

his right side had dissipated but is unfortunately coming back now.





   All Other Systems:  Reviewed and Negative





Medications





Current Inpatient Medications








 Medications


  (Trade)  Dose


 Ordered  Sig/Jae


 Route  Start Time


 Stop Time Status Last Admin


Dose Admin


 


 Acetaminophen


  (Tylenol Tab)  650 mg  Q4H  PRN


 PO  7/27/17 17:30


 8/26/17 17:29   


 


 


 Al Hydrox/Mg


 Hydrox/Simethicone


  (Maalox Max Susp)  15 ml  Q4H  PRN


 PO  7/27/17 17:30


 8/26/17 17:29   


 


 


 Magnesium


 Hydroxide


  (Milk Of


 Magnesia Susp)  30 ml  Q12H  PRN


 PO  7/27/17 17:30


 8/26/17 17:29   


 


 


 Ondansetron HCl


  (Zofran Inj)  4 mg  Q6H  PRN


 IV  7/27/17 17:30


 8/26/17 17:29   


 


 


 Polyethylene


  (Miralax Powder


 Packet)  17 gm  DAILY  PRN


 PO  7/27/17 17:30


 8/26/17 17:29   


 


 


 Gabapentin


  (Neurontin Cap)  200 mg  TID


 PO  7/27/17 21:00


 8/26/17 20:59  7/31/17 09:26


200 MG


 


 Oxycodone HCl


  (Roxicodone


 Immediate Rel Tab)  5 mg  Q4H  PRN


 PO  7/27/17 17:30


 8/10/17 17:29  7/31/17 09:33


5 MG


 


 Nicotine


  (Nicoderm Cq


 14MG Patch)  1 patch  QAM


 TD  7/27/17 18:00


 8/26/17 17:59  7/31/17 09:27


1 PATCH


 


 Miscellaneous


  (Remove Nicoderm


 Patch)  1 ea  HS


 N/A  7/27/17 21:00


 8/26/17 20:59  7/30/17 21:17


1 EA


 


 Ioversol


  (Optiray 320)  100 ml  UD  PRN


 IV  7/27/17 18:00


 7/31/17 17:59   


 


 


 Acetaminophen 650


 mg/Empty Bag  65 ml @ 


 260 mls/hr  Q6H  PRN


 IV  7/28/17 12:45


 8/27/17 12:44   


 


 


 Celecoxib


  (CeleBREX CAP)  100 mg  BID


 PO  7/28/17 12:45


 8/27/17 12:44  7/31/17 09:27


100 MG


 


 Tramadol HCl


  (Ultram Tab)  50 mg  Q4H  PRN


 PO  7/28/17 12:45


 8/27/17 12:44  7/29/17 10:58


50 MG


 


 Warfarin Sodium


  (Coumadin Tab)  5 mg  DAILY@16


 PO  7/28/17 16:00


 8/27/17 15:59  7/30/17 16:21


5 MG


 


 Heparin Sodium/


 Dextrose  500 ml @ 


 28 mls/hr  [id number] PRN


 IV  7/29/17 00:15


 8/28/17 00:14  7/31/17 05:29


28 MLS/HR


 


 Amoxicillin/


 Clavulanate


 Potassium


  (Augmentin Tab)  875 mg  BIDM


 PO  7/30/17 11:27


 8/6/17 11:26  7/31/17 09:26


875 MG











Objective


Vital Signs











  Date Time  Temp Pulse Resp B/P (MAP) Pulse Ox O2 Delivery O2 Flow Rate FiO2


 


7/31/17 07:20 36.5 83 18 142/93 (109) 97 Room Air  


 


7/31/17 00:05 36.9 101 16 160/94 (116) 97 Room Air  


 


7/31/17 00:00      Room Air  


 


7/30/17 16:00      Room Air  


 


7/30/17 15:35 36.4 83 18 152/91 (111) 98 Room Air  











Physical Exam


General Appearance:  WD/WN, + mild distress (labile)


Eyes:  normal inspection, sclerae normal


ENT:  hearing grossly normal


Neck:  supple, trachea midline


Respiratory/Chest:  chest non-tender, no respiratory distress, no accessory 

muscle use, + decreased breath sounds (right base), + pertinent finding (coarse 

breath sounds of right lower lobe)


Cardiovascular:  regular rate, rhythm


Abdomen:  normal bowel sounds, non tender


Extremities:  normal range of motion, normal inspection


Neurologic/Psychiatric:  alert, + depressed affect


Skin:  normal color, warm/dry, no rash





Laboratory Results











Item Value  Date Time


 


MRSA DNA Surveillance Screen - Final Complete 7/27/17 2300





Nasal Specimen Negative for MRSA by DNA Probe 


 


Blood Culture - Preliminary Resulted 7/27/17 1905





Blood NO GROWTH TO DATE. 


 


Blood Culture - Preliminary Resulted 7/27/17 1848





Blood Aerobic Actinomycete 











Last 24 Hours








Test


  7/30/17


16:50 7/31/17


07:18


 


Prothrombin Time  15.3 SECONDS 


 


Prothromb Time International


Ratio 


  1.4 


 


 


Activated Partial


Thromboplast Time 


  63.7 SECONDS 


 


 


Partial Thromboplastin Ratio  2.5 











Assessment and Plan


Patient with chest pain, probable pleuritic in nature, and possible pneumonia 

of the RML and RLL with Actinomyces growing in 1/2 initial blood cultures. He 

is currently on PO Augmentin. Will repeat blood cultures. Will transition back 

to IV Unasyn pending likely surgical intervention. Augmentin should cover, but 

pending further workup will change to IV therapy. 





PROVIDER ADDENDUM:


Pt. reviewed with BETH. Agree with above assessment.

## 2017-08-01 NOTE — PULMONOLOGY PROGRESS NOTE
Pulmonary Progress Note


Date of Service


Aug 1, 2017.





Attending


Dr. Griffin





Subjective


Patient seen and examined this morning.  He still complains of right pleuritic 

chest pain with deep inspiration.  Denies any fevers, chills, cough, or 

shortness of breath.





Objective


Vital signs reviewed and within normal limits. 


Gen.: Sitting in bed, in no acute respiratory distress, speaking in full 

sentences on room air.


Head: Atraumatic, normocephalic


Mouth: No oral thrush, no tonsillar exudates.


Neck: Supple, nontender, no cervical lymphadenopathy


CVS: S1-S2, regular rate and rhythm, no murmurs, rubs or gallops appreciated


Lungs/chest:  good air entry bilaterally, basilar crackles on the right.  

Reproducible chest pain on palpation of right chest wall.


Abdomen: Soft, nontender, bowel sounds positive


Extremities: No edema bilaterally seen on lower extremities, no cyanosis, no 

clubbing





Bedside ultrasound done to evaluate for possible thoracentesis.  There was not 

a sufficient echogenic fluid window for safe thoracentesis to be performed.





Labs and medications reviewed


Labs reviewed





Phosphatidylserine IgG 13


HIV antibody 07/27/2017negative


Urine Legionella from 07/28/2017not detected


QuantiFERON from 07/20/2017negative


Beta-1, 3-D glucan from 07/28/2017 negative





Microbiology


Blood culture 07/27/2017Actinomyces


Blood culture 07/27/2017no growth to date


Blood culture 2 07/31/2017pending





TTE 


* -- Conclusions --


* 1. Normal LV size and wall thickness.


* 2. Normal LV systolic funciton.  LVEF 60-65%.  No regional wall motion 

abnormalities.


* 3. Normal RV size and function.


* 4. No significant valvular pathology.  No vegetations noted.


* 5. Positive bubble study for interatrial shunt.


* 6. No prior studies for comparison.





Assessment & Plan


Actinomyces bacteremia


Right pleuritic chest pain


Right pleural effusion


Pulmonary embolism








34-year-old male who presented with right-sided pleuritic chest pain. Patient 

found to have right-sided pleural effusion and is currently being treated for 

left-sided pulmonary embolism.  He is positive for lupus anticoagulant and is 

currently on heparin drip and Coumadin for anticoagulation.  Blood cultures 

positive for actinomyces.  


He was empirically treated with vancomycin and Zosyn, and from 07/27/2017 to 07/ 30/2017.  His clinical status improved and he was switched to Augmentin on 07/30 /2017.  However in light of his new blood cultures used has been switched to 

Unasyn.  Today he was switched to penicillin G per infectious disease 

recommendations.


HIV, QuantiFERON and urine legionella are negative thus far.  He has been on 

oxycodone, Celebrex, tramadol and Tylenol when necessary for pain control.





Recommendations:


Follow-up repeat blood cultures.  Recommend ÁNGEL to rule out endocarditis.


Patient did not have sufficient window to safely perform thoracentesis.  At 

this time I would recommend to continue with penicillin per ID recommendations.


He should have a repeat CT chest without contrast 6-8 weeks after completion of 

antibiotic therapy to evaluate for interval resolution of pleural effusion.


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 

and to be followed by the pulmonary service as an outpatient.


For tobacco use disorder continue with nicotine patch.  Smoking cessation and 

illicit drug cessation advised.





Data


Medications:





Current Inpatient Medications








 Medications


  (Trade)  Dose


 Ordered  Sig/Jae


 Route  Start Time


 Stop Time Status Last Admin


Dose Admin


 


 Acetaminophen


  (Tylenol Tab)  650 mg  Q4H  PRN


 PO  7/27/17 17:30


 8/26/17 17:29   


 


 


 Al Hydrox/Mg


 Hydrox/Simethicone


  (Maalox Max Susp)  15 ml  Q4H  PRN


 PO  7/27/17 17:30


 8/26/17 17:29   


 


 


 Magnesium


 Hydroxide


  (Milk Of


 Magnesia Susp)  30 ml  Q12H  PRN


 PO  7/27/17 17:30


 8/26/17 17:29   


 


 


 Ondansetron HCl


  (Zofran Inj)  4 mg  Q6H  PRN


 IV  7/27/17 17:30


 8/26/17 17:29   


 


 


 Polyethylene


  (Miralax Powder


 Packet)  17 gm  DAILY  PRN


 PO  7/27/17 17:30


 8/26/17 17:29   


 


 


 Gabapentin


  (Neurontin Cap)  200 mg  TID


 PO  7/27/17 21:00


 8/26/17 20:59  8/1/17 13:26


200 MG


 


 Oxycodone HCl


  (Roxicodone


 Immediate Rel Tab)  5 mg  Q4H  PRN


 PO  7/27/17 17:30


 8/10/17 17:29  8/1/17 11:21


5 MG


 


 Nicotine


  (Nicoderm Cq


 14MG Patch)  1 patch  QAM


 TD  7/27/17 18:00


 8/26/17 17:59  8/1/17 08:50


1 PATCH


 


 Miscellaneous


  (Remove Nicoderm


 Patch)  1 ea  HS


 N/A  7/27/17 21:00


 8/26/17 20:59  7/31/17 20:49


1 EA


 


 Acetaminophen 650


 mg/Empty Bag  65 ml @ 


 260 mls/hr  Q6H  PRN


 IV  7/28/17 12:45


 8/27/17 12:44   


 


 


 Celecoxib


  (CeleBREX CAP)  100 mg  BID


 PO  7/28/17 12:45


 8/27/17 12:44  8/1/17 08:50


100 MG


 


 Tramadol HCl


  (Ultram Tab)  50 mg  Q4H  PRN


 PO  7/28/17 12:45


 8/27/17 12:44  7/29/17 10:58


50 MG


 


 Heparin Sodium/


 Dextrose  500 ml @ 


 25 mls/hr  Q20H PRN


 IV  7/29/17 00:15


 8/28/17 00:14  8/1/17 14:02


25 MLS/HR


 


 Heparin Sodium


  (Porcine)


  (Heparin 10 Unit/


 ml 5 ml Flush)  5 ml  PRN  PRN


 FLUSH  7/31/17 17:00


 8/30/17 16:59  8/1/17 08:51


5 ML


 


 Penicillin G


 Potassium 6 mu/


 Dextrose  262 ml @ 


 100 mls/hr  Q6@0400,1000,1600,2200


 IV  8/1/17 16:00


 8/15/17 15:59   


 








I & O:











24-Hour Column 


 


 8/2/17





 08:00


 


Intake Total 739 ml


 


Balance 739 ml








Vital Signs:











  Date Time  Temp Pulse Resp B/P (MAP) Pulse Ox O2 Delivery O2 Flow Rate FiO2


 


8/1/17 14:49 36.8 110 20 101/72 (82) 97 Room Air  


 


8/1/17 07:58      Room Air  


 


8/1/17 07:09 36.5 73 18 146/76 (99) 97 Room Air  


 


8/1/17 00:00      Room Air  


 


7/31/17 23:12 37.1 93 20 138/81 (100) 98 Room Air  


 


7/31/17 16:00      Room Air  








Laboratory Results:





Last 24 Hours








Test


  8/1/17


04:55 8/1/17


12:11


 


White Blood Count 12.84 K/uL  


 


Red Blood Count 4.25 M/uL  


 


Hemoglobin 12.7 g/dL  


 


Hematocrit 36.8 %  


 


Mean Corpuscular Volume 86.6 fL  


 


Mean Corpuscular Hemoglobin 29.9 pg  


 


Mean Corpuscular Hemoglobin


Concent 34.5 g/dl 


  


 


 


Platelet Count 308 K/uL  


 


Mean Platelet Volume 9.3 fL  


 


Neutrophils (%) (Auto) 56.3 %  


 


Lymphocytes (%) (Auto) 16.2 %  


 


Monocytes (%) (Auto) 11.1 %  


 


Eosinophils (%) (Auto) 15.5 %  


 


Basophils (%) (Auto) 0.4 %  


 


Neutrophils # (Auto) 7.24 K/uL  


 


Lymphocytes # (Auto) 2.08 K/uL  


 


Monocytes # (Auto) 1.42 K/uL  


 


Eosinophils # (Auto) 1.99 K/uL  


 


Basophils # (Auto) 0.05 K/uL  


 


RDW Standard Deviation 35.5 fL  


 


RDW Coefficient of Variation 11.4 %  


 


Immature Granulocyte % (Auto) 0.5 %  


 


Immature Granulocyte # (Auto) 0.06 K/uL  


 


Prothrombin Time 15.5 SECONDS  


 


Prothromb Time International


Ratio 1.4 


  


 


 


Activated Partial


Thromboplast Time 76.3 SECONDS 


  66.8 SECONDS 


 


 


Partial Thromboplastin Ratio 2.9  2.6 


 


Sodium Level 141 mmol/L  


 


Potassium Level 3.9 mmol/L  


 


Chloride Level 108 mmol/L  


 


Carbon Dioxide Level 27 mmol/L  


 


Anion Gap 6.0 mmol/L  


 


Blood Urea Nitrogen 11 mg/dl  


 


Creatinine 0.97 mg/dl  


 


Est Creatinine Clear Calc


Drug Dose 114.2 ml/min 


  


 


 


Estimated GFR (


American) 117.6 


  


 


 


Estimated GFR (Non-


American 101.4 


  


 


 


BUN/Creatinine Ratio 11.1  


 


Random Glucose 117 mg/dl  


 


Calcium Level 9.0 mg/dl  


 


Phosphorus Level 3.4 mg/dl  


 


Magnesium Level 2.1 mg/dl  


 


Total Bilirubin 0.2 mg/dl  


 


Aspartate Amino Transf


(AST/SGOT) 23 U/L 


  


 


 


Alanine Aminotransferase


(ALT/SGPT) 28 U/L 


  


 


 


Alkaline Phosphatase 162 U/L  


 


Total Protein 7.3 gm/dl  


 


Albumin 3.4 gm/dl  


 


Globulin 3.9 gm/dl  


 


Albumin/Globulin Ratio 0.9

## 2017-08-01 NOTE — MEDICAL STUDENT: [facility]
Med Student Progress Note


Date of Service


Aug 1, 2017.





Hermila Taylor is doing well today. His only complaint today is some back pain with deep 

inspiration. He describes the pain as an achy type pain, and says that it has 

improved since yesterday. He does not feel short of breath however, and denies 

productive cough, fever, chills, nausea or vomiting. He expressed his concerns 

regarding being discharged too soon and discussed how he would rather stay 

longer if it meant less problems in the long run. He also had questions 

regarding if he will be able to continue his work as a  with a PICC line. 

He also wanted to know more about cleaning and protecting the PICC line area. I 

told him that these details will be discussed closer to discharge and he will 

receive instructions at that time. He also mentioned concerns with housing and 

finances.





Review of Systems


Constitutional:  No fever, No chills, No sweats, No problem reported


ENT:  No nasal symptoms, No sore throat


Respiratory:  + see HPI, + problem reported (Pain with deep inhalation), No 

cough, No sputum, No wheezing, No shortness of breath


Cardiac:  No chest pain, No PND, No edema, No claudication, No problem reported


Abdomen:  No pain, No nausea, No vomiting, No diarrhea, No problem reported





Objective


Vital Signs











  Date Time  Temp Pulse Resp B/P (MAP) Pulse Ox O2 Delivery O2 Flow Rate FiO2


 


8/1/17 07:58      Room Air  


 


8/1/17 07:09 36.5 73 18 146/76 (99) 97 Room Air  


 


8/1/17 00:00      Room Air  


 


7/31/17 23:12 37.1 93 20 138/81 (100) 98 Room Air  


 


7/31/17 16:00      Room Air  


 


7/31/17 15:33 36.5 80 16 137/78 (97) 97 Room Air  











Physical Exam


General Appearance:  WD/WN, + mild distress


ENT:  hearing grossly normal


Neck:  no JVD, trachea midline


Respiratory/Chest:  chest non-tender, no respiratory distress, no accessory 

muscle use, + decreased breath sounds (bilaterally lower fields, with greater 

decrease on right compared to left. Dullness to percussion on both bases.)


Cardiovascular:  regular rate, rhythm, no edema, no JVD, no murmur, + extra 

beats (heard best over pulmonic valve)


Abdomen:  normal bowel sounds, non tender, soft


Extremities:  no pedal edema


Neurologic/Psychiatric:  alert, normal mood/affect, oriented x 3


Skin:  normal color, warm/dry





Laboratory Results





Last 24 Hours








Test


  8/1/17


04:55 8/1/17


12:11


 


White Blood Count 12.84 K/uL  


 


Red Blood Count 4.25 M/uL  


 


Hemoglobin 12.7 g/dL  


 


Hematocrit 36.8 %  


 


Mean Corpuscular Volume 86.6 fL  


 


Mean Corpuscular Hemoglobin 29.9 pg  


 


Mean Corpuscular Hemoglobin


Concent 34.5 g/dl 


  


 


 


Platelet Count 308 K/uL  


 


Mean Platelet Volume 9.3 fL  


 


Neutrophils (%) (Auto) 56.3 %  


 


Lymphocytes (%) (Auto) 16.2 %  


 


Monocytes (%) (Auto) 11.1 %  


 


Eosinophils (%) (Auto) 15.5 %  


 


Basophils (%) (Auto) 0.4 %  


 


Neutrophils # (Auto) 7.24 K/uL  


 


Lymphocytes # (Auto) 2.08 K/uL  


 


Monocytes # (Auto) 1.42 K/uL  


 


Eosinophils # (Auto) 1.99 K/uL  


 


Basophils # (Auto) 0.05 K/uL  


 


RDW Standard Deviation 35.5 fL  


 


RDW Coefficient of Variation 11.4 %  


 


Immature Granulocyte % (Auto) 0.5 %  


 


Immature Granulocyte # (Auto) 0.06 K/uL  


 


Prothrombin Time 15.5 SECONDS  


 


Prothromb Time International


Ratio 1.4 


  


 


 


Activated Partial


Thromboplast Time 76.3 SECONDS 


  66.8 SECONDS 


 


 


Partial Thromboplastin Ratio 2.9  2.6 


 


Sodium Level 141 mmol/L  


 


Potassium Level 3.9 mmol/L  


 


Chloride Level 108 mmol/L  


 


Carbon Dioxide Level 27 mmol/L  


 


Anion Gap 6.0 mmol/L  


 


Blood Urea Nitrogen 11 mg/dl  


 


Creatinine 0.97 mg/dl  


 


Est Creatinine Clear Calc


Drug Dose 114.2 ml/min 


  


 


 


Estimated GFR (


American) 117.6 


  


 


 


Estimated GFR (Non-


American 101.4 


  


 


 


BUN/Creatinine Ratio 11.1  


 


Random Glucose 117 mg/dl  


 


Calcium Level 9.0 mg/dl  


 


Phosphorus Level 3.4 mg/dl  


 


Magnesium Level 2.1 mg/dl  


 


Total Bilirubin 0.2 mg/dl  


 


Aspartate Amino Transf


(AST/SGOT) 23 U/L 


  


 


 


Alanine Aminotransferase


(ALT/SGPT) 28 U/L 


  


 


 


Alkaline Phosphatase 162 U/L  


 


Total Protein 7.3 gm/dl  


 


Albumin 3.4 gm/dl  


 


Globulin 3.9 gm/dl  


 


Albumin/Globulin Ratio 0.9  











Assessment and Plan


Assessment and Plan:


Brandon is a 33 yo  male with a history notable for non-IV 

substance use, hypercoagulability, Pulmonary Embolism, and Sickle cell trait 

who presented for shortness of breath and chest pain. Was found to have 

consolidation of fluid in right lung on CXR. Blood cultures grew Actinomyces. 

He is being treated with IV Ampicillin/Sulbactam.





Pneumonia: He is currently stable and in only mild distress. Thoracic surgery 

consult decided not to intervene at this time. His treatment will need to 

continue for 4-6 weeks via IV, followed by 6-12 months oral penicillin. He will 

need a PICC line placed for outpatient therapy and education provided. Continue 

with monitoring for signs of worsening pulmonary function. 





Hypercoagulable state: Continue with heparin while inpatient. Continue with 

Warfarin outpatient. 





Social concerns: there is a concern that he may have difficulty with housing 

following discharge, and this may severely impact his ability to manage a PICC 

line outpatient.  consult may be beneficial.





====================================


I personally examined and interviewed the patient


I discussed above plan with medical student


I wrote a separate note





Melanie Nguyen

## 2017-08-01 NOTE — DISCHARGE INSTRUCTIONS
Discharge Instructions


Date of Service


Aug 1, 2017.





Admission


Reason for Admission:  Pneumonia, Pleural Effusion





Discharge


Discharge Diagnosis / Problem:  Bacteremia





Discharge Goals


Goal(s):  Decrease discomfort, Improve function, Improve disease control, Learn 

about illness, Diagnostic testing, Therapeutic intervention, Prevent Disease 

Progression





Activity Recommendations


Activity Limitations:  resume your previous activity





.





Instructions / Follow-Up


Instructions / Follow-Up


You were admitted to the hospital because of persistent right-sided chest pain, 

which is likely multifactorial:


   1. Bacteremia (bacteria in the blood)-


      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 This requires IV Penicillin G (

antibiotic) x6 weeks, then oral antibiotics x1 year


      Due to 6 weeks of IV antibiotic treatment, a PICC line was placed. 


      Your were evaluated by thoracic surgery (Dr. Edwards)- sometimes, 

decortication (removing the bacteria) is need. Fortunately, no intervention is 

required. 


         A repeat CT scan was completed on 08/06, which reported improved in 

consolidation opacities (infection). 


      Please take the Floranex (probiotic) 4 tablets by mouth three times per 

day. 


         This will help keep the "good" bacteria growing in your GI tract while 

on antibiotic therapy. 


   2. Pulmonary embolism- 


      Due to your history of hypercoagulable state, Xarelto is NOT recommended. 

We have been treating you with IV Heparin/Lovenox and Coumadin in the hospital.

  


      You will be taking Coumadin by mouth once daily


         TAKE 2.5 mg Coumadin (half a tablet) on 08/07 and follow INR on 08/08


         You will need to follow-up with routine INR blood work- you will then 

be further be instructed on your Coumadin dosage. 


            Blood work will be completed by home health services and sent to 

your PCP.


   3. Pleural effusion (fluid between the lung and chest wall)-


      You were evaluated by Dr. Edwards and pulmonary (Dr. Griffin)- sometimes 

this fluid needs drained. Fortunately, no intervention is required at this 

time.   


         A repeat CT scan was completed on 08/06, which reported improved 

pleural effusion. 





For the chest pain:


    You may take Ibuprofen 600 mg by mouth as needed every 6 hours. 


      Do NOT take for more than 2-3 days as excessive use of this medication 

can be harmful to the kidneys


      Please eat when taking medication to prevent upset stomach  


   Additionally, you may take Tylenol 650 mg by mouth as needed every 6 hours.


      Do NOT exceed more than 4g of Tylenol per day as excessive use of this 

medication can be harmful to the liver 


         


FOLLOW-UP: 





Please follow-up with your PCP as scheduled on 08/10/17





Please follow-up with Infectious disease as scheduled on 08/18/17





Please follow-up with Pulmonary on 08/25/17 





Follow-up with routine INR





Please follow-up/keep all of your subspecialty appointments





________________________________________________________________________________

______








Medication Instructions:





   * Warfarin is a medicine prescribed to prevent blood clots


   * Warfarin will thin your blood and help prevent new clots


   * Take your medications exactly as directed


   * Never skip a dose.  Never take a double dose.  If you 


      miss a dose, take it as soon as you remember


   * It is important for your doctor to monitor your prothrombin


      time (PT).  This is a lab test


   * Keep your appointment for lab tests





Risk of Adverse Drug Reactions and Interactions:


   


   * Warfarin increases your risk of bleeding


   * The food you eat and other medications you take can affect


      how Warfarin works in your body


   * Ask your doctor about daily aspirin therapy


   * It is very important to talk with your doctor about all of the


      other medicines, antibiotics, vitamins or herbal products that


      you are taking


   * All of your medication must be approved by your doctor, including


      new medicines, as well as medicines you have taken before


      you started taking Warfarin





Diet: 





   * In order for Warfarin to work properly, it is important to keep your


      intake of Vitamin K as consistent as possible


   * You should avoid any sudden change in Vitamin K intake


   * Report any significant changes in your diet or weight to your doctor





Call your Doctor if you experience any of the following:





   * Swelling or Pain in your leg


   * Sudden, continuous pain deep in a muscle


   * Pain that worsens when you are active or when you stand still for a long 

time


   * Chest Pain


   * Sudden Shortness of Breath


   * Rapid or pounding heart beat


   * Fainting


   * Dizziness


   * Cough with blood or bloody sputum


   * Sweating more than normal


   * Bruises


   * heavy or uncontrolled bleeding


   * Blood in your urine, stool or vomit


   * Black or tarry stools





Caring for Your Self at Home:





   * Avoid sitting, standing or lying down for long periods without moving your 

legs


      and feet


   * When traveling by car, stop to get out and move around at least once every 

3 hours


   * On long airplane, train or bus rides, get up and move around when possible


   * If you can't get up, wiggle your toes and tighten your calves to keep your 

blood


      moving








Follow Up: 





   * It is important for you to keep your follow up appointments with your 

medical provider.





Current Hospital Diet


Patient's current hospital diet: Regular Diet





Discharge Diet


Recommended Diet:  Regular Diet





Procedures


Procedures Performed:  


Echocardiogram, cheat CTA, bilateral lower extremity venous doppler, upper


extremity ultrasound, chest


CT





Pending Studies


Studies pending at discharge:  no





Medical Emergencies








.


Who to Call and When:





Medical Emergencies:  If at any time you feel your situation is an emergency, 

please call 911 immediately.





.





Non-Emergent Contact


Non-Emergency issues call your:  Primary Care Provider





.


.








"Provider Documentation" section prepared by María Sheridan.








.





VTE Core Measure


Inpt VTE Proph given/why not?:  Enoxaparin (Lovenox)SQ (bridged with Lovenox 

until INR therapuetic x2 days), Warfarin (Coumadin), T.E.D. Stockings, SCD's

## 2017-08-01 NOTE — PROGRESS NOTE
Subjective


Date of Service:


Aug 1, 2017.


Subjective


Pt evaluation today including:  conversation w/ patient, physical exam, chart 

review, lab review, review of studies, conversation w/ consultant, review of 

inpatient medication list





Problem List


Medical Problems:


(1) Polysubstance abuse


Status: Acute  





(2) Pulmonary embolism


Status: Acute  





(3) Pulmonary infarction


Status: Acute  





(4) Sleep deprivation


Status: Acute  











Review of Systems


Constitutional:  No see HPI, No fever, No chills, No sweats, No weight loss, No 

weakness, No fatigue, No problem reported


Eyes:  No see HPI, No worsening of vision, No eye pain, No redness, No discharge

, No diplopia, No problem reported


ENT:  No see HPI, No hearing loss, No unusual epistaxis, No nasal symptoms, No 

sore throat, No tinnitus, No dental problems, No trouble swallowing, No problem 

reported


Respiratory:  No see HPI, No cough, No sputum, No wheezing, No shortness of 

breath, No dyspnea on exertion, No dyspnea at rest, No hemoptysis, No problem 

reported


Cardiac:  No see HPI, No chest pain, No orthopnea, No PND, No edema, No 

claudication, No palpitations, No problem reported


Abdomen:  No see HPI, No pain, No nausea, No vomiting, No diarrhea, No 

constipation, No GI bleeding, No problem reported


Musculoskeletal:  No see HPI, No joint pain, No muscle pain, No swelling, No 

calf pain, No problem reported


Neurologic:  No see HPI, No memory loss, No paralysis, No weakness, No numbness/

tingling, No vertigo, No balance problems, No problem reported


Psychiatric:  No see HPI, No depression symptoms, No anhedonism, No anxiety, No 

insomnia, No substance abuse, No problem reported


Heme:  No see HPI, No abnormal bleeding/bruising, No clotting problems, No 

swollen lymph nodes, No night sweats, No problem reported


Endo:  No see HPI, No fatigue, No excessive thirst, No excessive urination, No 

problem reported


Skin:  No see HPI, No rash, No itch, No new/changing skin lesions, No color 

change, No bleeding, No problem reported





Objective


Vital Signs











  Date Time  Temp Pulse Resp B/P (MAP) Pulse Ox O2 Delivery O2 Flow Rate FiO2


 


8/1/17 16:00     97 Room Air  


 


8/1/17 14:49 36.8 110 20 101/72 (82) 97 Room Air  


 


8/1/17 07:58      Room Air  


 


8/1/17 07:09 36.5 73 18 146/76 (99) 97 Room Air  


 


8/1/17 00:00      Room Air  


 


7/31/17 23:12 37.1 93 20 138/81 (100) 98 Room Air  











Physical Exam


General Appearance:  WD/WN, no apparent distress


Eyes:  normal inspection, EOMI


ENT:  normal ENT inspection, hearing grossly normal


Neck:  supple


Respiratory/Chest:  chest non-tender, lungs clear, normal breath sounds, no 

respiratory distress, no accessory muscle use


Cardiovascular:  regular rate, rhythm, no edema, no gallop, no JVD, no murmur


Abdomen:  normal bowel sounds, non tender, soft, no organomegaly, no pulsatile 

mass


Extremities:  normal range of motion, non-tender, normal inspection, no pedal 

edema, no calf tenderness


Neurologic/Psychiatric:  CNs II-XII nml as tested, no motor/sensory deficits, 

alert, normal mood/affect, oriented x 3


Skin:  normal color, warm/dry, no rash





Laboratory Results





Last 24 Hours








Test


  8/1/17


04:55 8/1/17


12:11


 


White Blood Count 12.84 K/uL  


 


Red Blood Count 4.25 M/uL  


 


Hemoglobin 12.7 g/dL  


 


Hematocrit 36.8 %  


 


Mean Corpuscular Volume 86.6 fL  


 


Mean Corpuscular Hemoglobin 29.9 pg  


 


Mean Corpuscular Hemoglobin


Concent 34.5 g/dl 


  


 


 


Platelet Count 308 K/uL  


 


Mean Platelet Volume 9.3 fL  


 


Neutrophils (%) (Auto) 56.3 %  


 


Lymphocytes (%) (Auto) 16.2 %  


 


Monocytes (%) (Auto) 11.1 %  


 


Eosinophils (%) (Auto) 15.5 %  


 


Basophils (%) (Auto) 0.4 %  


 


Neutrophils # (Auto) 7.24 K/uL  


 


Lymphocytes # (Auto) 2.08 K/uL  


 


Monocytes # (Auto) 1.42 K/uL  


 


Eosinophils # (Auto) 1.99 K/uL  


 


Basophils # (Auto) 0.05 K/uL  


 


RDW Standard Deviation 35.5 fL  


 


RDW Coefficient of Variation 11.4 %  


 


Immature Granulocyte % (Auto) 0.5 %  


 


Immature Granulocyte # (Auto) 0.06 K/uL  


 


Prothrombin Time 15.5 SECONDS  


 


Prothromb Time International


Ratio 1.4 


  


 


 


Activated Partial


Thromboplast Time 76.3 SECONDS 


  66.8 SECONDS 


 


 


Partial Thromboplastin Ratio 2.9  2.6 


 


Sodium Level 141 mmol/L  


 


Potassium Level 3.9 mmol/L  


 


Chloride Level 108 mmol/L  


 


Carbon Dioxide Level 27 mmol/L  


 


Anion Gap 6.0 mmol/L  


 


Blood Urea Nitrogen 11 mg/dl  


 


Creatinine 0.97 mg/dl  


 


Est Creatinine Clear Calc


Drug Dose 114.2 ml/min 


  


 


 


Estimated GFR (


American) 117.6 


  


 


 


Estimated GFR (Non-


American 101.4 


  


 


 


BUN/Creatinine Ratio 11.1  


 


Random Glucose 117 mg/dl  


 


Calcium Level 9.0 mg/dl  


 


Phosphorus Level 3.4 mg/dl  


 


Magnesium Level 2.1 mg/dl  


 


Total Bilirubin 0.2 mg/dl  


 


Aspartate Amino Transf


(AST/SGOT) 23 U/L 


  


 


 


Alanine Aminotransferase


(ALT/SGPT) 28 U/L 


  


 


 


Alkaline Phosphatase 162 U/L  


 


Total Protein 7.3 gm/dl  


 


Albumin 3.4 gm/dl  


 


Globulin 3.9 gm/dl  


 


Albumin/Globulin Ratio 0.9  











Assessment and Plan


Assessment:


RLL density/pleural effusion, likely parapneumonic in nature


actinomyces bacteremia


Recent PE


intra atrial shunt (positive bubble sign on echo)


Hx of drug abuse (only smoking or sniffing never IV )











Plan:


- consult ID, consult and Pulm 


- PIC line placed 7/31 after 5 days of Abx , previous blood Cx turned positive 1 /2 after 4 days. doubt any culture will turn out positive but discussed with 

patient and ID. He is supposed to come back in 5-7 days for removal of this 

PICC and placing a new one in the other arm (discussed with IV team, being on 

coumadin is fine )  (denies any IVDA, in the past he used to snore), will need 

6 weeks of IV PCN then repeat CT chest as an OP, F/U w pulmonologist and ID, 

then after 6 weeks of IV he will need 12 months of oral, plus probiotics


- TTE showed no  endocarditis  but was Positive bubble study for interatrial 

shunt., instructed to always use a filter for his IV line, instructed to keep 

INR 2-3 , patient was given a copy of his echo report (can not have repair 

until infection is treated)


- switched Abx to PCN G IV


- Hypercoagulable workup completed- lupus anticoagulant positive 


- Oxycodone and Tylenol PRN for pain control  


- BCx 7/28 grew actinomyces , the rest of Bcx are negative thus far but need to 

be followed for a longer time


- negative Fungitel / Quantiferone Gold


-HIV test pending(patient verbally agreed)

## 2017-08-01 NOTE — DISCHARGE SUMMARY
Discharge Summary


Date of Service


Aug 1, 2017.


 (María Sheridan ., BETH)





Discharge Summary


Admission Date:


2017 at 16:19


Discharge Date:  Aug 7, 2017


Discharge Disposition:  Home with services


Principal Diagnosis:  Bacteremia


Problems/Secondary Diagnoses:


Persistent pleuritic right-sided CP


Pulmonary embolism


Right-sided pleural effusion 


Anxiety/depression, PTSD, explosive disorder


h/o drug abuse


h/o tobacco abuse


Procedures:


ECHOCARDIOGRAM:





Interpretation Summary


* Name: MCKAYLA RYDER II  Study Date: 2017 01:15 PM  BP: 126/

72 mmHg


* MRN: [id number]  Patient Location: [facility]\S\S241\S\1  HR: 80


* : 1983 (M/d/yyyy)  Gender: Male  Height: 75 in


* Age: 34 yrs  Ethnicity: AA  Weight: 186 lb


* Ordering Physician: María Sheridan


* Referring Physician: Melanie Altamirano


* Performed By: FARRAH Middleton RCS


* Accession# [id number]  Account# [id number]


* Reason For Study: Endocarditis


* BSA: 2.1 m2


* -- Conclusions --


* 1. Normal LV size and wall thickness.


* 2. Normal LV systolic funciton.  LVEF 60-65%.  No regional wall motion 

abnormalities.


* 3. Normal RV size and function.


* 4. No significant valvular pathology.  No vegetations noted.


* 5. Positive bubble study for interatrial shunt.


* 6. No prior studies for comparison.


Procedure Details


* A complete two-dimensional transthoracic echocardiogram was performed (2D, M-

mode, Doppler and color flow Doppler).


* A saline contrast injection was performed to assess for cardiac shunting.


* The injection was performed through an intravenous line in the right arm.


* The attending nurse who injected the saline contrast was ANA LUISA Randhawa RN.


* A total of 10 cc of agitated saline was given.


Left Ventricle


* The left ventricle is grossly normal size.


* There is normal left ventricular wall thickness.


* Ejection Fraction = 60-65%.


* No regional wall motion abnormalities noted.


Right Ventricle


* The right ventricle is grossly normal size.


* The right ventricular systolic function is normal as assessed by tricuspid 

annular plane systolic excursion (TAPSE) (normal >1.5 cm).


Atria


* The left atrial size is normal.


* Right atrial size is normal.


* Injection of contrast documented an interatrial shunt.


Mitral Valve


* The mitral valve is grossly normal.


* There is no vegetation seen on the mitral valve.


* Mitral stenosis is absent.


* Significant mitral regurgitation is absent.


Tricuspid Valve


* There is no tricuspid valve vegetation.


* There is trace tricuspid regurgitation.


* Right ventricular systolic pressure is normal.


Aortic Valve


* The aortic valve opens well.


* The aortic valve is trileaflet.


* There is no aortic valvular vegetation.


* No hemodynamically significant valvular aortic stenosis.


* Trace aortic regurgitation.


Pulmonic Valve


* The pulmonary valve is inadequately visualized, but the Doppler data is 

adequate for interpretation.


* Pulmonic stenosis is absent.


* Trace pulmonic valvular regurgitation.


Great Vessels


* The aortic root and proximal ascending aorta are normal sized.


Pericardium/Pleural


* There is no pericardial effusion.


Great Vessels


* Normal inferior vena cava size and collapsability with sniff indicates a 

normal right atrial pressure of 3 mmHg








BILATERAL LOWER EXTREMITY VENOUS DOPPLER





HISTORY:      Pulmonary embolus.





COMPARISON STUDY:  CTA pulmonary artery study of same day.





FINDINGS: There is normal compressibility, flow, and augmentation within the


bilateral lower extremity deep venous systems.





IMPRESSION:  


No DVT within the right or left lower extremity.











Electronically signed by:  Elvis Dubois M.D.


2017 9:47 PM





Dictated Date/Time:  2017 9:46 PM





 The status of this report is Signed.   


 Draft = Not yet reviewed or approved by Radiologist.  


 Signed = Reviewed and approved by Radiologist.





(CHEST FOR PE) ANGIO WITH





CT DOSE: 497.90 mGycm





HISTORY:  34 years-old Male with acute chest pain and pneumonia. Recently


diagnosed acute pulmonary embolus.





TECHNIQUE: Multiple CTA images of the chest were obtained after the intravenous


administration of 111ml Optiray 320.  Coronal and sagittal MIPS were obtained


from the axial data set and were submitted for review.  A dose lowering


technique was utilized adhering to the principles of ALARA.








COMPARISON: CT chest 2017.





FINDINGS: 





CTA: Heart is normal in size. No thoracic aortic aneurysm or dissection. There


is near complete resolution of the previously noted emboli of the left lower


lobe. There is linear filling defect seen within a lingular lobar branch on


image 182 suggesting residual chronic thrombus. No new pulmonary emboli are


identified.





CT CHEST: There is a small-to-moderate right pleural effusion. Multifocal


consolidative opacities involve the right middle and right lower lobes. Ground


glass opacities involve the right upper lobe. The left lung is generally clear.


No obstructing bronchial lesion is seen. The upper abdominal structures are


within normal limits. The soft tissues are unremarkable. The bones appear


intact.








IMPRESSION:  


1. Near complete resolution of the previously noted pulmonary emboli of the left


lower lobe. Likely minimal residual thrombus is seen within a lingular lobar


branch. No acute occlusive pulmonary emboli are identified.


2. Small to moderate right pleural effusion with multifocal consolidative


opacities of the right middle and lower lobes suggest pneumonia with associated


bronchitis.


3. Groundglass opacities of the upper lobes suggest associated atelectasis.











The above report was generated using voice recognition software. It may contain


grammatical, syntax or spelling errors.











Electronically signed by:  Elvis Dubois M.D.


2017 9:37 PM





Dictated Date/Time:  2017 9:30 PM





 The status of this report is Signed.   


 Draft = Not yet reviewed or approved by Radiologist.  


Signed = Reviewed and approved by Radiologist.





ULTRASOUND venous Doppler ultrasound the right upper extremity 





CLINICAL HISTORY: Right arm pain. Indwelling PICC line.    





COMPARISON STUDY:  No previous studies for comparison. 





FINDINGS: The right internal jugular, subclavian, axillary, brachial, basilic,


radial, and ulnar veins appeared patent. A right-sided PICC catheter is


visualized. There is thrombus present within the mid right cephalic vein.





IMPRESSION: Mid right cephalic vein thrombus.











Electronically signed by:  Haseeb Rooney M.D.


2017 7:13 AM





Dictated Date/Time:  2017 6:34 AM





 The status of this report is Signed.   


 Draft = Not yet reviewed or approved by Radiologist.  


 Signed = Reviewed and approved by Radiologist.





(CHEST) CT THORAX WITHOUT





CT DOSE: 289.84 mGy.cm





HISTORY:      eval for ongoing effusion, infiltrates





TECHNIQUE: Multiaxial CT images of the chest were performed without contrast.  A


dose lowering technique was utilized adhering to the principles of ALARA.





COMPARISON: Chest CT 2017.





FINDINGS: The central airways are patent. No pneumothorax. The left lung is


essentially clear. Improving groundglass densities within the right upper lobe.


Small right pleural effusion and right basilar consolidative opacities have


significantly improved. Stable 3 mm groundglass nodule within the left lower


lobe on image 31. No fractures within the visualized osseous structures. A right


PICC terminates in the proximal SVC. Normal caliber thoracic aorta. The heart is


normal in size. No significant pericardial effusion. The visualized liver and


spleen are unremarkable.





IMPRESSION: 





1. Significant improvement with near complete resolution of the right basilar


consolidative opacities.


2. Small right pleural effusion has also improved. 











Electronically signed by:  Mauricio Orellana M.D.


2017 6:14 PM





Dictated Date/Time:  2017 6:07 PM





 The status of this report is Signed.   


 Draft = Not yet reviewed or approved by Radiologist.  


 Signed = Reviewed and approved by Radiologist.


Consultations:


Infectious disease


Pulmonary 


Thoracic surgery 


 (María Sheridan, BETH)





Medication Reconciliation


New Medications:  


Lactobacillus Acidophilus (Floranex) 1 Tab Tab


4 TAB PO TID for 30 Days, #360 TABS 5 Refills





Warfarin Sodium (Coumadin) 5 Mg Tab


1 TAB PO DAILY for 30 Days, #30 TAB 1 Refill





 


Continued Medications:  


Gabapentin (Neurontin) 100 Mg Cap


200 MG PO TID, CAP





 


Discontinued Medications:  


Doxycycline Monohydrate (Monodox) 100 Mg Cap


100 MG PO BID for 10 Days, #20 CAP





Hydroxyzine Hcl (Atarax) 50 Mg Tab


50 MG PO DAILY, TAB





Oxycodone Immediate Rel Tab (Roxicodone Ir) 5 Mg Tab


1-2 TAB PO Q4H PRN for Severe Pain, #24 TAB





Rivaroxaban (Xarelto) 15 Mg Tab


15 MG PO BID, TAB











Discharge Exam


Review of Systems:  


   Constitutional:  No fever, No chills, No sweats, No weakness, No fatigue


   Respiratory:  No cough, No sputum, No shortness of breath, No hemoptysis


   Cardiovascular:  No chest pain, No edema, No palpitations


   Abdomen:  No pain, No nausea, No vomiting, No diarrhea, No constipation, No 

GI bleeding


   Musculoskeletal:  No joint pain, No muscle pain, No calf pain


   Genitourinary - Male:  No hematuria, No dysuria


   Neurologic:  No weakness, No numbness/tingling


   Psychiatric:  No depression symptoms, No anxiety


   Hematologic / Lymphatic:  No abnormal bleeding/bruising


   Integumentary:  No rash, No itch, No new/changing skin lesions


Physical Exam:  


   General Appearance:  WD/WN, no apparent distress


   Eyes:  PERRL


   ENT:  hearing grossly normal


   Neck:  supple


   Respiratory/Chest:  lungs clear, no respiratory distress, no accessory 

muscle use


   Cardiovascular:  regular rate, rhythm


   Abdomen / GI:  normal bowel sounds, non tender, soft


   Extremities:  no calf tenderness, no pedal edema


   Neurologic/Psychiatric:  alert, normal mood/affect, oriented x 3


   Skin:  normal color, warm/dry, no rash


 (María Sheridan, PA-CLAIR)





Hospital Course


Admission H&P: Patient is a pleasant 35 y/o male, with PMHx of PE, lupus 

anticoagulant positive, sickle-cell trait, h/o drug abuse, tobacco abuse, 

depression/anxiety/PTSD, who was a direct admission from Dr. Hudson's office 

due to persistent pleuritic right-sided CP here for additional workup. Per 

patient, he was seen by his PCP at the beginning of the month for CP, SOB, and 

cough. He went to his PCP and was diagnosed with PNA and placed on antibiotic 

therapy. He then received a call from his PCP a few days later instructing him 

to report to the ED. On , he was diagnosed with a PE and placed on Xarelto. 

He refused admission and was discharged to home. A renal US was performed on  to r/o kidney stone- US was unremarkable.  He reported back to the ED on  due to persistent right-sided chest/flank pain. A repeat CT of the abdomen/

pelvis noted slight increase in right-sided pleural effusion and persistent 

right middle lobe and lower lobe ground-glass and linear opacities suggesting 

PNA or possible pulmonary infarct when compared to . He again refused 

admission and was discharge to home with Doxycycline. He was seen by Dr. Hudson 

today and encouraged to be admitted to [facility] for additional workup of continued 

right-sided CP. Oxycodone did help relieve his pain. Pain is worse with deep 

breaths, coughing, and movement. No ttp. +non-productive cough. Patient denies 

any fever, chills, sweats, lightheadedness, dizziness, vision changes, 

palpitations, edema, SOB, wheezing, abdominal pain, nausea, vomiting, diarrhea, 

urinary symptoms, melena, numbness/tingling, weakness, muscle/joint pain, 

anxiety/depression, active bleeding, or new skin discoloration/changes. 





Patient has a significant PMHx of drug abuse (snorting/smoking meth and 

marijuana use)- has been on Suboxone in the past. He denies IV drug use. He 

went through detox at the [facility]; alcohol abuse- states he has had only 3 

drinks in the last 6 months, and tobacco abuse- smokes 1/2-1 pack per day. 

Patient was given Oxycodone for his pain, but states his GF's son stole the 

prescription. 





He has a h/o PTSD, explosive disorder, homelessness, and arrested this spring 

for assault. He is currently transitioning to a new home. He went to outpatient 

rehab at the [facility]. Currently, patient is prescribed 

Gabapentin and Vistaril, but states he does not take Vistaril because "he is 

not messing with that shit."





Physical Exam


Vital Signs











  Date Time  Temp Pulse Resp B/P (MAP) Pulse Ox O2 Delivery O2 Flow Rate FiO2


 


17 17:15 36.7 103 16 146/82 98 Room Air  








General Appearance:  no apparent distress


Head:  normocephalic, atraumatic


Eyes:  normal inspection, PERRL


ENT:  hearing grossly normal


Neck:  supple


Respiratory/Chest:  no respiratory distress, no accessory muscle use, + 

decreased breath sounds (right lung base )


Cardiovascular:  regular rate, rhythm


Abdomen/GI:  normal bowel sounds, non tender, soft


Back:  normal inspection


Extremities/Musculoskelatal:  no calf tenderness, no pedal edema


Neurologic/Psych:  alert, normal mood/affect, oriented x 3


Skin:  normal color, warm/dry, no rash





Hospital Course:





Persistent pleuritic right-sided CP:


- Admit to tele for cardiac monitoring- no acute events


   -- Transferred to med/surg on 


- Cardiac enzymes- negative 


- CTA reviewed 


   -- Repeat CT scan on - Significant improvement with near complete 

resolution of the right basilar consolidative opacities. Small right pleural 

effusion has also improved. 


- Bilateral lower extremity venous US- no evidence of DVT 


- ECHO to r/o endocarditis


* 1. Normal LV size and wall thickness.


* 2. Normal LV systolic funciton.  LVEF 60-65%.  No regional wall motion 

abnormalities.


* 3. Normal RV size and function.


* 4. No significant valvular pathology.  No vegetations noted.


* 5. Positive bubble study for interatrial shunt.


* 6. No prior studies for comparison. 


- IV Heparin bridging w/ PO Coumadin due to hypercoagulable state


   -- Transitioned to Lovenox in preparation for discharge- Following INR-  at 

discharge INR 2.8- instructed to take 2.5 mg tonight () and follow-up INR 

on 


      -- Did NOT discharge w/ Lovenox due to INR within therapeutic range and 

bridged w/ Lovenox x2 days


- Hypercoagulable workup completed- lupus anticoagulant positive


   -- Due to hypercoagulable state, Xarelto is NOT recommend. Discussed w/ 

patient, will be placed on Coumadin with routine INR f/u 


- Broad spectrum IV antibiotics w/ IV Zosyn and Vancomycin- MRSA swab negative 


   -- Transitioned to PO Augmentin on - d/c'd on  and transitioned to 

IV Unasyn due to BCx


   -- Transitioned to IV Penillicin G on   


- Oxycodone, Celebrex BID, and Tramadol, Tylenol PRN for pain control  


- BCx- growing aerobic actinomycete 


   -- Repeat BCx on - NG


   -- ID following 


- UA, HIV, TB, urine legionella- negative 


- CBC, CMP, PT/INR/PTT pending at this time 


- Consult pulmonary, appreciate recommendations


   -- No thoracentesis 


- Consult ID, appreciate recommendations 


   -- IV Penicillin G 24 MU/day x6 weeks, then PO antibiotics x1 year 


- Consult thoracic surgery, appreciate recommendations 


   -- No decortication 


   -- Repeat CT scan on - reporting improvement in consolidation opacities 


- PICC on 


   -- NEEDS FILTER DO TO INTRA-ATRIAL SHUNT 


   -- RUE US on - Mid right cephalic vein thrombus.


      -- Spoke w/ IV team, not associated to PICC- no need for PICC replacement 


      -- On anticoagulation 





Anxiety/depression, PTSD, explosive disorder: Continue Gabapentin 100 mg TID 





h/o drug abuse- use to snort/smoke Meth: 


- Denies IVDA 


- States he is no longer on Suboxone- states last snorted meth 1 month ago  


- Patient leaving facility property (went to [facility] on ), ?suspicious 

activity and episodes of tachycardia- [facility] drug screen on - + for meth 


   -- Discussed other possible antibiotic options w/ Dr. Johns due to h/o drug 

abuse- recommends against other treatment options  





h/o tobacco abuse- /2-1 pack per day: Nicotine patch 





DVT Prophylaxis: IV Heparin, Coumadin 





Code Status: LEVEL I, FULL





Dispo: Discharge to home w/ [facility] 





Summary:





Bacteremia, growing Actinomycetes on  BCx of :


   -- Will need 6 weeks IV antibiotic treatment with Penicillin G, then PO 

antibiotics x1 year 


   -- PICC LINE NEEDS FILTER DUE TO INTRA-ARTERIAL SHUT IDENTIFIED ON 

ECHOCARDIOGRAM  


   -- Repeat BCx of - NG





Pulmonary embolism:


   -- Xarelto NOT recommended in hypercoagulable state


   -- Treated with IV Heparin and Coumadin while inpatient


   -- Discharged on Coumadin with INR therapeutic goal 2-3





Pleural effusion:


   -- No intervention required at this time 


   -- Repeat CT scan on  showing improvement in pleural effusion 





Right-sided chest pain:


    -- May take Ibuprofen 600 mg q6 hrs x2-3 days, and/or Tylenol 650 mg q6 hrs 

PRN for pain management 





Follow-ups scheduled prior to discharge:


   PCP, ID, pulmonary, and routine INR- discussed appointments w/ patient











Discussed care plan at discharge with patient. Asked multiple times if he had 

any questions or concerns with the plan- stated no and is comfortable with plan.


Total Time Spent:  Greater than 30 minutes


This includes examination of the patient, discharge planning, medication 

reconciliation, and communication with other providers.


 (María Sheridan ., PA-C)


PA Physician Supervision Note:





I interviewed and examined the patient. Discussed with María Sheridan PAC and 

agree with findings and plan as documented in the note. Any exceptions or 

clarifications are listed here: None





Patient presented with pulmonary embolisms was also found to have actinomyces 

blood stream infection he was seen in consultation by Dr. Hamlin and Dr. Edwards, will follow him as an outpatient.





Patient's vital signs are stable he is off any oxygen supplementation and his 

Coumadin is therapeutic having overlap with enoxaparin for 48 hours.  He'll be 

home with IV PICC line for penicillin therapy per infectious disease 

recommendation with strong warning given his history of previous drug abuse to 

seek counseling and help for this problem





Vital signs are stable


Lungs are clear





Patient will be discharged home on penicillin therapy 4 times a day for 6 weeks

, with follow-up with infectious disease pulmonary medicine and thoracic 

surgery strongly counseled to stop smoking stop using any form of illicit drugs 

and to be compliant with his Coumadin therapy.





Documented By:  Miki Lennon


 (Miki Lennon M.D.)


Discharge Instructions


Please refer to the electronic Patient Visit Report (Discharge Instructions) 

for additional information.


 (Mraía Sheridan ., PA-C)





Follow-Up


Please follow-up with your PCP within 5-7 days





Please follow-up with Infectious disease within 2-3 weeks 





Please follow-up with Pulmonary within 2-3 weeks 





Follow-up with routine INR 





Please follow-up/keep all of your subspecialty appointments


 (María Sheridan ., PA-C)





Additional Copies To


Kurt Null M.D.; Rashad Baeza M.D.

## 2017-08-01 NOTE — HOSPITALIST PROGRESS NOTE
Hospitalist Progress Note


Date of Service


Aug 1, 2017.





Subjective


Pt evaluation today including:  conversation w/ patient, physical exam, chart 

review, lab review, review of inpatient medication list


Voiding:  no voiding problems, no incontinence


Patient states he is feeling well. 


Eating and drinking OK. 


No pleuritic CP. 





Patient denies any fever, chills, sweats, lightheadedness, dizziness, vision 

changes, CP, palpitations, edema, SOB, wheezing, cough, abdominal pain, nausea, 

vomiting, diarrhea, urinary symptoms, melena, numbness/tingling, weakness, 

muscle/joint pain, anxiety/depression, active bleeding, or new skin 

discoloration/changes.





Medications





Current Inpatient Medications








 Medications


  (Trade)  Dose


 Ordered  Sig/Jae


 Route  Start Time


 Stop Time Status Last Admin


Dose Admin


 


 Acetaminophen


  (Tylenol Tab)  650 mg  Q4H  PRN


 PO  7/27/17 17:30


 8/26/17 17:29   


 


 


 Al Hydrox/Mg


 Hydrox/Simethicone


  (Maalox Max Susp)  15 ml  Q4H  PRN


 PO  7/27/17 17:30


 8/26/17 17:29   


 


 


 Magnesium


 Hydroxide


  (Milk Of


 Magnesia Susp)  30 ml  Q12H  PRN


 PO  7/27/17 17:30


 8/26/17 17:29   


 


 


 Ondansetron HCl


  (Zofran Inj)  4 mg  Q6H  PRN


 IV  7/27/17 17:30


 8/26/17 17:29   


 


 


 Polyethylene


  (Miralax Powder


 Packet)  17 gm  DAILY  PRN


 PO  7/27/17 17:30


 8/26/17 17:29   


 


 


 Gabapentin


  (Neurontin Cap)  200 mg  TID


 PO  7/27/17 21:00


 8/26/17 20:59  8/1/17 08:50


200 MG


 


 Oxycodone HCl


  (Roxicodone


 Immediate Rel Tab)  5 mg  Q4H  PRN


 PO  7/27/17 17:30


 8/10/17 17:29  7/31/17 23:58


5 MG


 


 Nicotine


  (Nicoderm Cq


 14MG Patch)  1 patch  QAM


 TD  7/27/17 18:00


 8/26/17 17:59  8/1/17 08:50


1 PATCH


 


 Miscellaneous


  (Remove Nicoderm


 Patch)  1 ea  HS


 N/A  7/27/17 21:00


 8/26/17 20:59  7/31/17 20:49


1 EA


 


 Acetaminophen 650


 mg/Empty Bag  65 ml @ 


 260 mls/hr  Q6H  PRN


 IV  7/28/17 12:45


 8/27/17 12:44   


 


 


 Celecoxib


  (CeleBREX CAP)  100 mg  BID


 PO  7/28/17 12:45


 8/27/17 12:44  8/1/17 08:50


100 MG


 


 Tramadol HCl


  (Ultram Tab)  50 mg  Q4H  PRN


 PO  7/28/17 12:45


 8/27/17 12:44  7/29/17 10:58


50 MG


 


 Heparin Sodium/


 Dextrose  500 ml @ 


 28 mls/hr  [id number] PRN


 IV  7/29/17 00:15


 8/28/17 00:14  8/1/17 06:56


28 MLS/HR


 


 Ampicillin Sodium/


 Sulbactam Sodium


 3000 mg/Sodium


 Chloride  108 ml @ 


 200 mls/hr  Q6H


 IV  7/31/17 12:00


 8/7/17 11:59  8/1/17 06:06


200 MLS/HR


 


 Ampicillin Sodium/


 Sulbactam Sodium


  (Consult)  1 ea  UD  PRN


 N/A  7/31/17 12:45


 8/30/17 12:44   


 


 


 Heparin Sodium


  (Porcine)


  (Heparin 10 Unit/


 ml 5 ml Flush)  5 ml  PRN  PRN


 FLUSH  7/31/17 17:00


 8/30/17 16:59  8/1/17 08:51


5 ML











Objective


Vital Signs











  Date Time  Temp Pulse Resp B/P (MAP) Pulse Ox O2 Delivery O2 Flow Rate FiO2


 


8/1/17 07:58      Room Air  


 


8/1/17 07:09 36.5 73 18 146/76 (99) 97 Room Air  


 


8/1/17 00:00      Room Air  


 


7/31/17 23:12 37.1 93 20 138/81 (100) 98 Room Air  


 


7/31/17 16:00      Room Air  


 


7/31/17 15:33 36.5 80 16 137/78 (97) 97 Room Air  











Physical Exam


General Appearance:  WD/WN, no apparent distress


Eyes:  PERRL


ENT:  hearing grossly normal


Neck:  supple


Respiratory/Chest:  lungs clear, no respiratory distress, no accessory muscle 

use


Cardiovascular:  regular rate, rhythm


Abdomen:  normal bowel sounds, non tender, soft


Extremities:  no pedal edema, no calf tenderness


Neurologic/Psychiatric:  no motor/sensory deficits, alert, normal mood/affect, 

oriented x 3


Skin:  normal color, warm/dry, no rash





Laboratory Results





Last 24 Hours








Test


  8/1/17


04:55


 


White Blood Count 12.84 K/uL 


 


Red Blood Count 4.25 M/uL 


 


Hemoglobin 12.7 g/dL 


 


Hematocrit 36.8 % 


 


Mean Corpuscular Volume 86.6 fL 


 


Mean Corpuscular Hemoglobin 29.9 pg 


 


Mean Corpuscular Hemoglobin


Concent 34.5 g/dl 


 


 


Platelet Count 308 K/uL 


 


Mean Platelet Volume 9.3 fL 


 


Neutrophils (%) (Auto) 56.3 % 


 


Lymphocytes (%) (Auto) 16.2 % 


 


Monocytes (%) (Auto) 11.1 % 


 


Eosinophils (%) (Auto) 15.5 % 


 


Basophils (%) (Auto) 0.4 % 


 


Neutrophils # (Auto) 7.24 K/uL 


 


Lymphocytes # (Auto) 2.08 K/uL 


 


Monocytes # (Auto) 1.42 K/uL 


 


Eosinophils # (Auto) 1.99 K/uL 


 


Basophils # (Auto) 0.05 K/uL 


 


RDW Standard Deviation 35.5 fL 


 


RDW Coefficient of Variation 11.4 % 


 


Immature Granulocyte % (Auto) 0.5 % 


 


Immature Granulocyte # (Auto) 0.06 K/uL 


 


Prothrombin Time 15.5 SECONDS 


 


Prothromb Time International


Ratio 1.4 


 


 


Activated Partial


Thromboplast Time 76.3 SECONDS 


 


 


Partial Thromboplastin Ratio 2.9 


 


Sodium Level 141 mmol/L 


 


Potassium Level 3.9 mmol/L 


 


Chloride Level 108 mmol/L 


 


Carbon Dioxide Level 27 mmol/L 


 


Anion Gap 6.0 mmol/L 


 


Blood Urea Nitrogen 11 mg/dl 


 


Creatinine 0.97 mg/dl 


 


Est Creatinine Clear Calc


Drug Dose 114.2 ml/min 


 


 


Estimated GFR (


American) 117.6 


 


 


Estimated GFR (Non-


American 101.4 


 


 


BUN/Creatinine Ratio 11.1 


 


Random Glucose 117 mg/dl 


 


Calcium Level 9.0 mg/dl 


 


Phosphorus Level 3.4 mg/dl 


 


Magnesium Level 2.1 mg/dl 


 


Total Bilirubin 0.2 mg/dl 


 


Aspartate Amino Transf


(AST/SGOT) 23 U/L 


 


 


Alanine Aminotransferase


(ALT/SGPT) 28 U/L 


 


 


Alkaline Phosphatase 162 U/L 


 


Total Protein 7.3 gm/dl 


 


Albumin 3.4 gm/dl 


 


Globulin 3.9 gm/dl 


 


Albumin/Globulin Ratio 0.9 











Assessment and Plan


Patient is a pleasant 33 y/o male, with PMHx of PE, lupus anticoagulant positive

, sickle-cell trait, h/o drug abuse, tobacco abuse, depression/anxiety/PTSD, 

who was a direct admission from Dr. Hudson's office due to persistent pleuritic 

right-sided CP here for additional workup. 





Persistent pleuritic right-sided CP:


- Admit to tele for cardiac monitoring- no acute events


   -- Transferred to med/surg on 7/29


- Cardiac enzymes- negative 


- CTA reviewed 


- Bilateral lower extremity venous US- no evidence of DVT 


- ECHO to r/o endocarditis


* 1. Normal LV size and wall thickness.


* 2. Normal LV systolic funciton.  LVEF 60-65%.  No regional wall motion 

abnormalities.


* 3. Normal RV size and function.


* 4. No significant valvular pathology.  No vegetations noted.


* 5. Positive bubble study for interatrial shunt.


* 6. No prior studies for comparison. 


- Obtain ÁNGEL to r/o endocarditis 


- IV Heparin bridging w/ PO Coumadin due to hypercoagulable state


   -- Following INR 


- Hypercoagulable workup completed- lupus anticoagulant positive 


- Broad spectrum IV antibiotics w/ IV Zosyn and Vancomycin- MRSA swab negative 


   -- Transitioned to PO Augmentin on 7/30- d/c'd on 7/31 and transitioned to 

IV Unasyn due to BCx- transition to Penicillin G on 8/1 


- Oxycodone, Celebrex BID, and Tramadol, Tylenol PRN for pain control  


- BCx- growing aerobic actinomycete 


   -- Repeat BCx on 7/31 pending 


   -- ID following- begin IV Unasyn 


- UA, HIV, TB, urine legionella- negative 


- CBC, CMP, PT/INR/PTT pending at this time 


- Consult pulmonary, appreciate recommendations


   -- No thoracentesis 


- Consult ID, appreciate recommendations 


   -- Penicillin G IV 24 mu daily x6 weeks, then PO antibiotic x1 year


- Consult thoracic surgery, appreciate recommendations


   --  No indication for decortication





Anxiety/depression, PTSD, explosive disorder: Continue Gabapentin 100 mg TID 





h/o drug abuse- use to snort/smoke Meth: Denies IVDA 





h/o tobacco abuse- 1/2-1 pack per day: Nicotine patch 





DVT Prophylaxis: IV Heparin, Coumadin 





Code Status: LEVEL I, FULL





Dispo: Discharge to home tomorrow 


- PICC placed on 7/31

## 2017-08-01 NOTE — SURGERY PROGRESS NOTE
DATE: 08/01/2017

 

I saw Mr. Romero today on 08/01/2017 with his significant other.  He looks

good.  He states he had a quiet night.  His pain seems a bit better.  He is

afebrile.  Vital signs are stable.  Pulse oximetry is 97% on room air.  He

does have decreased breath sounds on the right.  His white count bumped a bit

to 12,840.  Hemoglobin is stable.  Platelet count is stable.  His vital signs

are stable.  His INR is up to 1.4 and his PT is 76.3.

 

I had a long discussion with the patient, his significant other as well as

Dr. Griffin and Dr. Hamlin.  We could make a case for performing a

thoracentesis; however, he has very little fluid.  Dr. Griffin is going to use

an ultrasound to see if he thinks he could tap a small amount of fluid at

bedside under ultrasound guidance.  I discussed this with Mr. Romero and

of course he is agreeable.  On the other hand, he looks so much better that I

think simply repeating a CT scan in a few days to see where we are from a

fluid standpoint is also an option.

## 2017-08-01 NOTE — INFECTIOUS DISEASE PROGRESS NT
Progress Note


Date of Service


Aug 1, 2017.





Subjective


Pt evaluation today including:  conversation w/ patient, physical exam, chart 

review, lab review, review of studies, conversation w/ consultant, review of 

inpatient medication list


Patient doing somewhat better, anticipating discharge.  No fever.  No other new 

complaints.  Appears to be tolerating antibiotic without apparent difficulty.





   All Other Systems:  Reviewed and Negative





Medications





Current Inpatient Medications








 Medications


  (Trade)  Dose


 Ordered  Sig/Jae


 Route  Start Time


 Stop Time Status Last Admin


Dose Admin


 


 Acetaminophen


  (Tylenol Tab)  650 mg  Q4H  PRN


 PO  7/27/17 17:30


 8/26/17 17:29   


 


 


 Al Hydrox/Mg


 Hydrox/Simethicone


  (Maalox Max Susp)  15 ml  Q4H  PRN


 PO  7/27/17 17:30


 8/26/17 17:29   


 


 


 Magnesium


 Hydroxide


  (Milk Of


 Magnesia Susp)  30 ml  Q12H  PRN


 PO  7/27/17 17:30


 8/26/17 17:29   


 


 


 Ondansetron HCl


  (Zofran Inj)  4 mg  Q6H  PRN


 IV  7/27/17 17:30


 8/26/17 17:29   


 


 


 Polyethylene


  (Miralax Powder


 Packet)  17 gm  DAILY  PRN


 PO  7/27/17 17:30


 8/26/17 17:29   


 


 


 Gabapentin


  (Neurontin Cap)  200 mg  TID


 PO  7/27/17 21:00


 8/26/17 20:59  8/1/17 13:26


200 MG


 


 Oxycodone HCl


  (Roxicodone


 Immediate Rel Tab)  5 mg  Q4H  PRN


 PO  7/27/17 17:30


 8/10/17 17:29  8/1/17 11:21


5 MG


 


 Nicotine


  (Nicoderm Cq


 14MG Patch)  1 patch  QAM


 TD  7/27/17 18:00


 8/26/17 17:59  8/1/17 08:50


1 PATCH


 


 Miscellaneous


  (Remove Nicoderm


 Patch)  1 ea  HS


 N/A  7/27/17 21:00


 8/26/17 20:59  7/31/17 20:49


1 EA


 


 Acetaminophen 650


 mg/Empty Bag  65 ml @ 


 260 mls/hr  Q6H  PRN


 IV  7/28/17 12:45


 8/27/17 12:44   


 


 


 Celecoxib


  (CeleBREX CAP)  100 mg  BID


 PO  7/28/17 12:45


 8/27/17 12:44  8/1/17 08:50


100 MG


 


 Tramadol HCl


  (Ultram Tab)  50 mg  Q4H  PRN


 PO  7/28/17 12:45


 8/27/17 12:44  7/29/17 10:58


50 MG


 


 Heparin Sodium/


 Dextrose  500 ml @ 


 25 mls/hr  Q20H PRN


 IV  7/29/17 00:15


 8/28/17 00:14  8/1/17 14:02


25 MLS/HR


 


 Ampicillin Sodium/


 Sulbactam Sodium


  (Consult)  1 ea  UD  PRN


 N/A  7/31/17 12:45


 8/30/17 12:44   


 


 


 Heparin Sodium


  (Porcine)


  (Heparin 10 Unit/


 ml 5 ml Flush)  5 ml  PRN  PRN


 FLUSH  7/31/17 17:00


 8/30/17 16:59  8/1/17 08:51


5 ML


 


 Penicillin G


 Potassium 6 mu/


 Dextrose  262 ml @ 


 100 mls/hr  Q6


 IV  8/1/17 15:15


 8/15/17 15:14 UNV  


 











Objective


Vital Signs











  Date Time  Temp Pulse Resp B/P (MAP) Pulse Ox O2 Delivery O2 Flow Rate FiO2


 


8/1/17 14:49 36.8 110 20 101/72 (82) 97 Room Air  


 


8/1/17 07:58      Room Air  


 


8/1/17 07:09 36.5 73 18 146/76 (99) 97 Room Air  


 


8/1/17 00:00      Room Air  


 


7/31/17 23:12 37.1 93 20 138/81 (100) 98 Room Air  


 


7/31/17 16:00      Room Air  


 


7/31/17 15:33 36.5 80 16 137/78 (97) 97 Room Air  











Physical Exam


General Appearance:  WD/WN, no apparent distress


Eyes:  normal inspection, EOMI, sclerae normal


ENT:  normal ENT inspection, hearing grossly normal, pharynx normal


Neck:  supple, no adenopathy, thyroid normal, trachea midline


Respiratory/Chest:  chest non-tender, no respiratory distress, no accessory 

muscle use, + rhonchi


Cardiovascular:  regular rate, rhythm, no gallop, no murmur


Abdomen:  normal bowel sounds, non tender, soft, no organomegaly


Extremities:  non-tender, no calf tenderness


Neurologic/Psychiatric:  CNs II-XII nml as tested, alert, oriented x 3


Skin:  normal color, no rash


Lymphatic:  no adenopathy





Laboratory Results





Last 24 Hours








Test


  8/1/17


04:55 8/1/17


12:11


 


White Blood Count 12.84 K/uL  


 


Red Blood Count 4.25 M/uL  


 


Hemoglobin 12.7 g/dL  


 


Hematocrit 36.8 %  


 


Mean Corpuscular Volume 86.6 fL  


 


Mean Corpuscular Hemoglobin 29.9 pg  


 


Mean Corpuscular Hemoglobin


Concent 34.5 g/dl 


  


 


 


Platelet Count 308 K/uL  


 


Mean Platelet Volume 9.3 fL  


 


Neutrophils (%) (Auto) 56.3 %  


 


Lymphocytes (%) (Auto) 16.2 %  


 


Monocytes (%) (Auto) 11.1 %  


 


Eosinophils (%) (Auto) 15.5 %  


 


Basophils (%) (Auto) 0.4 %  


 


Neutrophils # (Auto) 7.24 K/uL  


 


Lymphocytes # (Auto) 2.08 K/uL  


 


Monocytes # (Auto) 1.42 K/uL  


 


Eosinophils # (Auto) 1.99 K/uL  


 


Basophils # (Auto) 0.05 K/uL  


 


RDW Standard Deviation 35.5 fL  


 


RDW Coefficient of Variation 11.4 %  


 


Immature Granulocyte % (Auto) 0.5 %  


 


Immature Granulocyte # (Auto) 0.06 K/uL  


 


Prothrombin Time 15.5 SECONDS  


 


Prothromb Time International


Ratio 1.4 


  


 


 


Activated Partial


Thromboplast Time 76.3 SECONDS 


  66.8 SECONDS 


 


 


Partial Thromboplastin Ratio 2.9  2.6 


 


Sodium Level 141 mmol/L  


 


Potassium Level 3.9 mmol/L  


 


Chloride Level 108 mmol/L  


 


Carbon Dioxide Level 27 mmol/L  


 


Anion Gap 6.0 mmol/L  


 


Blood Urea Nitrogen 11 mg/dl  


 


Creatinine 0.97 mg/dl  


 


Est Creatinine Clear Calc


Drug Dose 114.2 ml/min 


  


 


 


Estimated GFR (


American) 117.6 


  


 


 


Estimated GFR (Non-


American 101.4 


  


 


 


BUN/Creatinine Ratio 11.1  


 


Random Glucose 117 mg/dl  


 


Calcium Level 9.0 mg/dl  


 


Phosphorus Level 3.4 mg/dl  


 


Magnesium Level 2.1 mg/dl  


 


Total Bilirubin 0.2 mg/dl  


 


Aspartate Amino Transf


(AST/SGOT) 23 U/L 


  


 


 


Alanine Aminotransferase


(ALT/SGPT) 28 U/L 


  


 


 


Alkaline Phosphatase 162 U/L  


 


Total Protein 7.3 gm/dl  


 


Albumin 3.4 gm/dl  


 


Globulin 3.9 gm/dl  


 


Albumin/Globulin Ratio 0.9  











Assessment and Plan


Patient with chest pain, probable pleuritic in nature, and possible pneumonia 

of the RML and RLL with Actinomyces growing in 1/2 initial blood cultures. He 

will be discharged for outpatient treatment with IV penicillin as discussed 

with hospitalist service.  Will follow up with patient in the office.

## 2017-08-02 NOTE — DIAGNOSTIC IMAGING REPORT
ULTRASOUND venous Doppler ultrasound the right upper extremity 



CLINICAL HISTORY: Right arm pain. Indwelling PICC line.    



COMPARISON STUDY:  No previous studies for comparison. 



FINDINGS: The right internal jugular, subclavian, axillary, brachial, basilic,

radial, and ulnar veins appeared patent. A right-sided PICC catheter is

visualized. There is thrombus present within the mid right cephalic vein.



IMPRESSION: Mid right cephalic vein thrombus.







Electronically signed by:  Haseeb Rooney M.D.

8/2/2017 7:13 AM



Dictated Date/Time:  8/2/2017 6:34 AM

## 2017-08-02 NOTE — MEDICAL STUDENT: [facility]
Med Student Progress Note


Date of Service


Aug 2, 2017.





Hermila Taylor is feeling ok today. He describes the chest pain with breathing as about 

the same as yesterday, by which it is a constant pain that is somewhat dull in 

quality. Some swelling of his ankles has developed since yesterday. He had many 

concerns about his PICC line, in particular he is concerned that it moves some 

with flexion and extension of his arm, and there is some bleeding around the 

site. At our conversation he was expressing a desire to have it removed and 

just "take his chances". I informed him that we will have this evaluated by 

someone with more experiences in PICC lines than myself. He also has developed 

some sharp midline back pain at about the level of T12. He denies coughing, n/v/

d but mentions he had chills all last night. He discussed his housing situation 

with me, explaining that their are two locations he is planning to go to. One 

is with a friend in a trailer, and the other is in a house of a family member. 

He is concerned that he won't be able to continue his job after discharge due 

to the PICC line. He was also curious about his INR and coagulation studies. 

These were discussed with him. He also asked if he could be allowed to try 

incentive spirometry. Lastly, he asked again where the infection came from, 

although this was discussed to him in the past.





Review of Systems


Constitutional:  + see HPI, + chills, No weight loss, No weakness, No fatigue


Eyes:  No problem reported


ENT:  No nasal symptoms, No sore throat, No problem reported


Respiratory:  + problem reported (Constant chest pain with breathing. Points to 

posterior lung bases when describing pain.), No cough, No sputum, No wheezing


Cardiac:  + see HPI, + edema (bilateral swelling of ankles), No chest pain, No 

orthopnea, No PND, No claudication, No palpitations


Abdomen:  + problem reported (Mentioned his abdomen feels "firm"), No pain, No 

nausea, No vomiting, No diarrhea, No constipation





Objective


Vital Signs











  Date Time  Temp Pulse Resp B/P (MAP) Pulse Ox O2 Delivery O2 Flow Rate FiO2


 


8/2/17 08:00     91 Room Air  


 


8/2/17 07:36 36.4 86 20 154/95 (114) 91 Room Air  


 


8/2/17 01:10      Room Air  


 


8/1/17 23:36 37.0 104 20  98 Room Air  


 


8/1/17 16:00     97 Room Air  


 


8/1/17 14:49 36.8 110 20 101/72 (82) 97 Room Air  











Physical Exam


General Appearance:  WD/WN, + mild distress (appeared to have some difficulty 

with breathing)


ENT:  hearing grossly normal, pharynx normal


Neck:  supple, no adenopathy, no JVD, trachea midline


Respiratory/Chest:  chest non-tender, no accessory muscle use, + respiratory 

distress (mild distress), + decreased breath sounds (Decreased over right lung 

base, dullness to percussion)


Cardiovascular:  regular rate, rhythm, no gallop, no JVD, no murmur


Abdomen:  normal bowel sounds, non tender, soft, no pulsatile mass


Extremities:  non-tender, normal inspection (No splinter hemmorhages, no 

janeway lesion, no osler nodules present), no calf tenderness, + pedal edema (

bilateral ankle non-pitting swelling)


Neurologic/Psychiatric:  alert, normal mood/affect, oriented x 3





Laboratory Results





Last 24 Hours








Test


  8/1/17


12:11 8/2/17


05:38


 


Activated Partial


Thromboplast Time 66.8 SECONDS 


  64.0 SECONDS 


 


 


Partial Thromboplastin Ratio 2.6  2.5 


 


White Blood Count  12.69 K/uL 


 


Red Blood Count  4.10 M/uL 


 


Hemoglobin  12.5 g/dL 


 


Hematocrit  35.6 % 


 


Mean Corpuscular Volume  86.8 fL 


 


Mean Corpuscular Hemoglobin  30.5 pg 


 


Mean Corpuscular Hemoglobin


Concent 


  35.1 g/dl 


 


 


Platelet Count  264 K/uL 


 


Mean Platelet Volume  9.2 fL 


 


Neutrophils (%) (Auto)  55.9 % 


 


Lymphocytes (%) (Auto)  16.7 % 


 


Monocytes (%) (Auto)  10.3 % 


 


Eosinophils (%) (Auto)  15.9 % 


 


Basophils (%) (Auto)  0.4 % 


 


Neutrophils # (Auto)  7.09 K/uL 


 


Lymphocytes # (Auto)  2.12 K/uL 


 


Monocytes # (Auto)  1.31 K/uL 


 


Eosinophils # (Auto)  2.02 K/uL 


 


Basophils # (Auto)  0.05 K/uL 


 


RDW Standard Deviation  36.8 fL 


 


RDW Coefficient of Variation  11.6 % 


 


Immature Granulocyte % (Auto)  0.8 % 


 


Immature Granulocyte # (Auto)  0.10 K/uL 


 


Prothrombin Time  14.2 SECONDS 


 


Prothromb Time International


Ratio 


  1.3 


 


 


Sodium Level  140 mmol/L 


 


Potassium Level  4.2 mmol/L 


 


Chloride Level  107 mmol/L 


 


Carbon Dioxide Level  28 mmol/L 


 


Anion Gap  5.0 mmol/L 


 


Blood Urea Nitrogen  10 mg/dl 


 


Creatinine  1.00 mg/dl 


 


Est Creatinine Clear Calc


Drug Dose 


  110.8 ml/min 


 


 


Estimated GFR (


American) 


  113.3 


 


 


Estimated GFR (Non-


American 


  97.8 


 


 


BUN/Creatinine Ratio  9.6 


 


Random Glucose  102 mg/dl 


 


Calcium Level  9.0 mg/dl 


 


Total Bilirubin  0.2 mg/dl 


 


Aspartate Amino Transf


(AST/SGOT) 


  27 U/L 


 


 


Alanine Aminotransferase


(ALT/SGPT) 


  30 U/L 


 


 


Alkaline Phosphatase  145 U/L 


 


Total Protein  7.3 gm/dl 


 


Albumin  3.3 gm/dl 


 


Globulin  4.0 gm/dl 


 


Albumin/Globulin Ratio  0.8 











Assessment and Plan


Assessment and Plan:


Brandon is a 35 yo  male with a history of hypercoagulability, PE, 

substance use, and sickle cell trait who presented for chest pain and shortness 

of breath and was diagnosed with pneumonia with blood cultures growing 

actinomyces. He is currently being treated with IV penicillin, subQ heparin, 

and PRN pain management. An US yesterday demonstrated a thrombus in the mid 

Right cephalic vein, the same arm as the PICC line.





Pneumonia, actinomyces:


-Per physical exam, improving


-Per subjective, the same or possibly worsening


-US findings of thrombus in R cephalic vein





- Continue with IV penicillin.


- Remove PICC line due to thrombus


- Consider incentive Spirometry.








Hypercoagulability:


-Recent thrombus while on anticoagulation is concerning


- INR 1.3 and PTT of 64.0


- Echo with positive bubble sign suggesting abnormal communication between 

heart chambers





- Continue with Heparin


- Transition to Warfarin for outpatient therapy


- Monitor INR and PTT


- Consider vascular surgery for IVC filter due to failure of anticoagulant


Continued [facility] stay due to:  other (insertion of new PICC line)

## 2017-08-02 NOTE — INFECTIOUS DISEASE PROGRESS NT
Progress Note


Date of Service


Aug 2, 2017.





Subjective


Pt evaluation today including:  conversation w/ patient, physical exam, chart 

review, lab review, review of studies, conversation w/ consultant, review of 

inpatient medication list


No new complaints today.  Remains afebrile.  No increase in shortness of breath 

or chest pain.





   All Other Systems:  Reviewed and Negative





Medications





Current Inpatient Medications








 Medications


  (Trade)  Dose


 Ordered  Sig/Jae


 Route  Start Time


 Stop Time Status Last Admin


Dose Admin


 


 Acetaminophen


  (Tylenol Tab)  650 mg  Q4H  PRN


 PO  7/27/17 17:30


 8/26/17 17:29   


 


 


 Al Hydrox/Mg


 Hydrox/Simethicone


  (Maalox Max Susp)  15 ml  Q4H  PRN


 PO  7/27/17 17:30


 8/26/17 17:29   


 


 


 Magnesium


 Hydroxide


  (Milk Of


 Magnesia Susp)  30 ml  Q12H  PRN


 PO  7/27/17 17:30


 8/26/17 17:29   


 


 


 Ondansetron HCl


  (Zofran Inj)  4 mg  Q6H  PRN


 IV  7/27/17 17:30


 8/26/17 17:29   


 


 


 Polyethylene


  (Miralax Powder


 Packet)  17 gm  DAILY  PRN


 PO  7/27/17 17:30


 8/26/17 17:29   


 


 


 Gabapentin


  (Neurontin Cap)  200 mg  TID


 PO  7/27/17 21:00


 8/26/17 20:59  8/2/17 14:18


200 MG


 


 Oxycodone HCl


  (Roxicodone


 Immediate Rel Tab)  5 mg  Q4H  PRN


 PO  7/27/17 17:30


 8/10/17 17:29  8/2/17 01:28


5 MG


 


 Nicotine


  (Nicoderm Cq


 14MG Patch)  1 patch  QAM


 TD  7/27/17 18:00


 8/26/17 17:59  8/1/17 08:50


1 PATCH


 


 Miscellaneous


  (Remove Nicoderm


 Patch)  1 ea  HS


 N/A  7/27/17 21:00


 8/26/17 20:59  8/1/17 21:21


1 EA


 


 Acetaminophen 650


 mg/Empty Bag  65 ml @ 


 260 mls/hr  Q6H  PRN


 IV  7/28/17 12:45


 8/27/17 12:44   


 


 


 Celecoxib


  (CeleBREX CAP)  100 mg  BID


 PO  7/28/17 12:45


 8/27/17 12:44  8/2/17 09:16


100 MG


 


 Tramadol HCl


  (Ultram Tab)  50 mg  Q4H  PRN


 PO  7/28/17 12:45


 8/27/17 12:44  7/29/17 10:58


50 MG


 


 Heparin Sodium/


 Dextrose  500 ml @ 


 25 mls/hr  Q20H PRN


 IV  7/29/17 00:15


 8/28/17 00:14 Future Hold 8/2/17 07:16


25 MLS/HR


 


 Heparin Sodium


  (Porcine)


  (Heparin 10 Unit/


 ml 5 ml Flush)  5 ml  PRN  PRN


 FLUSH  7/31/17 17:00


 8/30/17 16:59  8/2/17 13:24


5 ML


 


 Penicillin G


 Potassium 6 mu/


 Dextrose  262 ml @ 


 100 mls/hr  Q6@0400,1000,1600,2200


 IV  8/1/17 16:00


 8/15/17 15:59  8/2/17 12:12


100 MLS/HR


 


 Warfarin Sodium


  (Coumadin Tab)  5 mg  DAILY@16


 PO  8/1/17 18:00


 8/31/17 17:59  8/1/17 19:30


5 MG


 


 Enoxaparin Sodium


  (Lovenox Inj)  90 mg  Q12


 SQ  8/2/17 12:00


 9/1/17 11:59  8/2/17 13:25


90 MG











Objective


Vital Signs











  Date Time  Temp Pulse Resp B/P (MAP) Pulse Ox O2 Delivery O2 Flow Rate FiO2


 


8/2/17 08:00     91 Room Air  


 


8/2/17 07:36 36.4 86 20 154/95 (114) 91 Room Air  


 


8/2/17 01:10      Room Air  


 


8/1/17 23:36 37.0 104 20  98 Room Air  


 


8/1/17 16:00     97 Room Air  


 


8/1/17 14:49 36.8 110 20 101/72 (82) 97 Room Air  











Physical Exam


General Appearance:  WD/WN, no apparent distress


Eyes:  normal inspection, sclerae normal


ENT:  normal ENT inspection, pharynx normal


Neck:  supple, no adenopathy, trachea midline


Respiratory/Chest:  chest non-tender, no respiratory distress, + rhonchi


Cardiovascular:  regular rate, rhythm, no gallop, no murmur


Abdomen:  normal bowel sounds, non tender, soft, no organomegaly


Extremities:  non-tender, no calf tenderness


Neurologic/Psychiatric:  alert, oriented x 3


Skin:  normal color, warm/dry, no rash


Lymphatic:  no adenopathy





Laboratory Results





Last 24 Hours








Test


  8/2/17


05:38


 


White Blood Count 12.69 K/uL 


 


Red Blood Count 4.10 M/uL 


 


Hemoglobin 12.5 g/dL 


 


Hematocrit 35.6 % 


 


Mean Corpuscular Volume 86.8 fL 


 


Mean Corpuscular Hemoglobin 30.5 pg 


 


Mean Corpuscular Hemoglobin


Concent 35.1 g/dl 


 


 


Platelet Count 264 K/uL 


 


Mean Platelet Volume 9.2 fL 


 


Neutrophils (%) (Auto) 55.9 % 


 


Lymphocytes (%) (Auto) 16.7 % 


 


Monocytes (%) (Auto) 10.3 % 


 


Eosinophils (%) (Auto) 15.9 % 


 


Basophils (%) (Auto) 0.4 % 


 


Neutrophils # (Auto) 7.09 K/uL 


 


Lymphocytes # (Auto) 2.12 K/uL 


 


Monocytes # (Auto) 1.31 K/uL 


 


Eosinophils # (Auto) 2.02 K/uL 


 


Basophils # (Auto) 0.05 K/uL 


 


RDW Standard Deviation 36.8 fL 


 


RDW Coefficient of Variation 11.6 % 


 


Immature Granulocyte % (Auto) 0.8 % 


 


Immature Granulocyte # (Auto) 0.10 K/uL 


 


Prothrombin Time 14.2 SECONDS 


 


Prothromb Time International


Ratio 1.3 


 


 


Activated Partial


Thromboplast Time 64.0 SECONDS 


 


 


Partial Thromboplastin Ratio 2.5 


 


Sodium Level 140 mmol/L 


 


Potassium Level 4.2 mmol/L 


 


Chloride Level 107 mmol/L 


 


Carbon Dioxide Level 28 mmol/L 


 


Anion Gap 5.0 mmol/L 


 


Blood Urea Nitrogen 10 mg/dl 


 


Creatinine 1.00 mg/dl 


 


Est Creatinine Clear Calc


Drug Dose 110.8 ml/min 


 


 


Estimated GFR (


American) 113.3 


 


 


Estimated GFR (Non-


American 97.8 


 


 


BUN/Creatinine Ratio 9.6 


 


Random Glucose 102 mg/dl 


 


Calcium Level 9.0 mg/dl 


 


Total Bilirubin 0.2 mg/dl 


 


Aspartate Amino Transf


(AST/SGOT) 27 U/L 


 


 


Alanine Aminotransferase


(ALT/SGPT) 30 U/L 


 


 


Alkaline Phosphatase 145 U/L 


 


Total Protein 7.3 gm/dl 


 


Albumin 3.3 gm/dl 


 


Globulin 4.0 gm/dl 


 


Albumin/Globulin Ratio 0.8 











Assessment and Plan


Patient with chest pain,  pleuritic in nature, and possible pneumonia of the 

RML and RLL with Actinomyces growing in 1/2 initial blood cultures. He will be 

discharged for outpatient treatment with IV penicillin as discussed with 

hospitalist service.  Will follow up with patient in the office.

## 2017-08-02 NOTE — HOSPITALIST PROGRESS NOTE
Hospitalist Progress Note


Date of Service


Aug 2, 2017.


 (María Sheridan ., BETH)





Subjective


Pt evaluation today including:  conversation w/ patient, conversation w/ family 

(GF- at bedside ), physical exam, chart review, lab review, review of studies, 

review of inpatient medication list


Voiding:  no voiding problems, no incontinence


Patient states he is feeling well. 


Eating and drinking OK. 





Patient denies any fever, chills, sweats, lightheadedness, dizziness, vision 

changes, CP, palpitations, edema, SOB, wheezing, cough, abdominal pain, nausea, 

vomiting, diarrhea, urinary symptoms, melena, numbness/tingling, weakness, 

muscle/joint pain, anxiety/depression, active bleeding, or new skin 

discoloration/changes. 


 (María Sheridan ., PA-C)





Medications





Current Inpatient Medications








 Medications


  (Trade)  Dose


 Ordered  Sig/Jae


 Route  Start Time


 Stop Time Status Last Admin


Dose Admin


 


 Acetaminophen


  (Tylenol Tab)  650 mg  Q4H  PRN


 PO  7/27/17 17:30


 8/26/17 17:29   


 


 


 Al Hydrox/Mg


 Hydrox/Simethicone


  (Maalox Max Susp)  15 ml  Q4H  PRN


 PO  7/27/17 17:30


 8/26/17 17:29   


 


 


 Magnesium


 Hydroxide


  (Milk Of


 Magnesia Susp)  30 ml  Q12H  PRN


 PO  7/27/17 17:30


 8/26/17 17:29   


 


 


 Ondansetron HCl


  (Zofran Inj)  4 mg  Q6H  PRN


 IV  7/27/17 17:30


 8/26/17 17:29   


 


 


 Polyethylene


  (Miralax Powder


 Packet)  17 gm  DAILY  PRN


 PO  7/27/17 17:30


 8/26/17 17:29   


 


 


 Gabapentin


  (Neurontin Cap)  200 mg  TID


 PO  7/27/17 21:00


 8/26/17 20:59  8/2/17 09:16


200 MG


 


 Oxycodone HCl


  (Roxicodone


 Immediate Rel Tab)  5 mg  Q4H  PRN


 PO  7/27/17 17:30


 8/10/17 17:29  8/2/17 01:28


5 MG


 


 Nicotine


  (Nicoderm Cq


 14MG Patch)  1 patch  QAM


 TD  7/27/17 18:00


 8/26/17 17:59  8/1/17 08:50


1 PATCH


 


 Miscellaneous


  (Remove Nicoderm


 Patch)  1 ea  HS


 N/A  7/27/17 21:00


 8/26/17 20:59  8/1/17 21:21


1 EA


 


 Acetaminophen 650


 mg/Empty Bag  65 ml @ 


 260 mls/hr  Q6H  PRN


 IV  7/28/17 12:45


 8/27/17 12:44   


 


 


 Celecoxib


  (CeleBREX CAP)  100 mg  BID


 PO  7/28/17 12:45


 8/27/17 12:44  8/2/17 09:16


100 MG


 


 Tramadol HCl


  (Ultram Tab)  50 mg  Q4H  PRN


 PO  7/28/17 12:45


 8/27/17 12:44  7/29/17 10:58


50 MG


 


 Heparin Sodium/


 Dextrose  500 ml @ 


 25 mls/hr  Q20H PRN


 IV  7/29/17 00:15


 8/28/17 00:14 Future Hold 8/2/17 07:16


25 MLS/HR


 


 Heparin Sodium


  (Porcine)


  (Heparin 10 Unit/


 ml 5 ml Flush)  5 ml  PRN  PRN


 FLUSH  7/31/17 17:00


 8/30/17 16:59  8/2/17 01:14


5 ML


 


 Penicillin G


 Potassium 6 mu/


 Dextrose  262 ml @ 


 100 mls/hr  Q6@0400,1000,1600,2200


 IV  8/1/17 16:00


 8/15/17 15:59  8/2/17 12:12


100 MLS/HR


 


 Warfarin Sodium


  (Coumadin Tab)  5 mg  DAILY@16


 PO  8/1/17 18:00


 8/31/17 17:59  8/1/17 19:30


5 MG


 


 Enoxaparin Sodium


  (Lovenox Inj)  90 mg  Q12


 SQ  8/2/17 12:00


 9/1/17 11:59   


 








 (María Sheridan, BETH)





Objective


Vital Signs











  Date Time  Temp Pulse Resp B/P (MAP) Pulse Ox O2 Delivery O2 Flow Rate FiO2


 


8/2/17 08:00     91 Room Air  


 


8/2/17 07:36 36.4 86 20 154/95 (114) 91 Room Air  


 


8/2/17 01:10      Room Air  


 


8/1/17 23:36 37.0 104 20  98 Room Air  


 


8/1/17 16:00     97 Room Air  


 


8/1/17 14:49 36.8 110 20 101/72 (82) 97 Room Air  








 (María Sheridan PA-C)





Physical Exam


General Appearance:  WD/WN, no apparent distress


Eyes:  normal inspection, PERRL


ENT:  hearing grossly normal


Neck:  supple


Respiratory/Chest:  lungs clear, no respiratory distress, no accessory muscle 

use


Cardiovascular:  regular rate, rhythm


Abdomen:  normal bowel sounds, non tender, soft


Extremities:  non-tender, no pedal edema


Neurologic/Psychiatric:  alert, normal mood/affect, oriented x 3


Skin:  normal color, warm/dry, no rash


 (María Sheridan PA-C)





Laboratory Results





Last 24 Hours








Test


  8/2/17


05:38


 


White Blood Count 12.69 K/uL 


 


Red Blood Count 4.10 M/uL 


 


Hemoglobin 12.5 g/dL 


 


Hematocrit 35.6 % 


 


Mean Corpuscular Volume 86.8 fL 


 


Mean Corpuscular Hemoglobin 30.5 pg 


 


Mean Corpuscular Hemoglobin


Concent 35.1 g/dl 


 


 


Platelet Count 264 K/uL 


 


Mean Platelet Volume 9.2 fL 


 


Neutrophils (%) (Auto) 55.9 % 


 


Lymphocytes (%) (Auto) 16.7 % 


 


Monocytes (%) (Auto) 10.3 % 


 


Eosinophils (%) (Auto) 15.9 % 


 


Basophils (%) (Auto) 0.4 % 


 


Neutrophils # (Auto) 7.09 K/uL 


 


Lymphocytes # (Auto) 2.12 K/uL 


 


Monocytes # (Auto) 1.31 K/uL 


 


Eosinophils # (Auto) 2.02 K/uL 


 


Basophils # (Auto) 0.05 K/uL 


 


RDW Standard Deviation 36.8 fL 


 


RDW Coefficient of Variation 11.6 % 


 


Immature Granulocyte % (Auto) 0.8 % 


 


Immature Granulocyte # (Auto) 0.10 K/uL 


 


Prothrombin Time 14.2 SECONDS 


 


Prothromb Time International


Ratio 1.3 


 


 


Activated Partial


Thromboplast Time 64.0 SECONDS 


 


 


Partial Thromboplastin Ratio 2.5 


 


Sodium Level 140 mmol/L 


 


Potassium Level 4.2 mmol/L 


 


Chloride Level 107 mmol/L 


 


Carbon Dioxide Level 28 mmol/L 


 


Anion Gap 5.0 mmol/L 


 


Blood Urea Nitrogen 10 mg/dl 


 


Creatinine 1.00 mg/dl 


 


Est Creatinine Clear Calc


Drug Dose 110.8 ml/min 


 


 


Estimated GFR (


American) 113.3 


 


 


Estimated GFR (Non-


American 97.8 


 


 


BUN/Creatinine Ratio 9.6 


 


Random Glucose 102 mg/dl 


 


Calcium Level 9.0 mg/dl 


 


Total Bilirubin 0.2 mg/dl 


 


Aspartate Amino Transf


(AST/SGOT) 27 U/L 


 


 


Alanine Aminotransferase


(ALT/SGPT) 30 U/L 


 


 


Alkaline Phosphatase 145 U/L 


 


Total Protein 7.3 gm/dl 


 


Albumin 3.3 gm/dl 


 


Globulin 4.0 gm/dl 


 


Albumin/Globulin Ratio 0.8 








 (María Sheridan, BETH)





Assessment and Plan


Patient is a pleasant 33 y/o male, with PMHx of PE, lupus anticoagulant positive

, sickle-cell trait, h/o drug abuse, tobacco abuse, depression/anxiety/PTSD, 

who was a direct admission from Dr. Hudson's office due to persistent pleuritic 

right-sided CP here for additional workup. 





Persistent pleuritic right-sided CP:


- Admit to tele for cardiac monitoring- no acute events


   -- Transferred to med/surg on 7/29


- Cardiac enzymes- negative 


- CTA reviewed 


- Bilateral lower extremity venous US- no evidence of DVT 


- ECHO to r/o endocarditis


* 1. Normal LV size and wall thickness.


* 2. Normal LV systolic funciton.  LVEF 60-65%.  No regional wall motion 

abnormalities.


* 3. Normal RV size and function.


* 4. No significant valvular pathology.  No vegetations noted.


* 5. Positive bubble study for interatrial shunt.


* 6. No prior studies for comparison. 


- IV Heparin bridging w/ PO Coumadin due to hypercoagulable state


   -- Following INR- 1.3 today


   -- Transitioned to Lovenox 1 mg/kg q12 on 08/02 in preparation for discharge

- Lovenox teaching completed  


- Hypercoagulable workup completed- lupus anticoagulant positive


   -- Due to hypercoagulable state, Xarelto is NOT recommend. Discussed w/ 

patient, will be placed on Coumadin with routine INR f/u 


- Broad spectrum IV antibiotics w/ IV Zosyn and Vancomycin- MRSA swab negative 


   -- Transitioned to PO Augmentin on 7/30- d/c'd on 7/31 and transitioned to 

IV Unasyn due to BCx, transitioned to IV Penicillin G on 08/1 


- Oxycodone, Celebrex BID, and Tramadol, Tylenol PRN for pain control  


- BCx- growing aerobic actinomycete 


   -- Repeat BCx on 7/31- NGTD


   -- ID following- begin IV Unasyn on 7/31, transition to Penicillin G on 08/ 01 


- UA, HIV, TB, urine legionella- negative 


- Consult pulmonary, appreciate recommendations


   -- No thoracentesis 


   -- Repeat CT scan in 6-8 weeks after IV antibiotics 


- Consult ID, appreciate recommendations 


   -- IV Penicillin G 24 MU/day x6 weeks, then PO antibiotics x1 year 


- Consult thoracic surgery, appreciate recommendations 


   -- No decortication


- PICC on 07/31


   -- NEEDS FILTER DO TO INTRA-ATRIAL SHUNT 


   -- RUE US on 08/02- Mid right cephalic vein thrombus.


      -- Spoke w/ IV team, not associated to PICC- no need for PICC replacement 


      -- On anticoagulation 





Anxiety/depression, PTSD, explosive disorder: Continue Gabapentin 100 mg TID 





h/o drug abuse- use to snort/smoke Meth: 


- Denies IVDA 


- States he is no longer on Suboxone 





h/o tobacco abuse- 1/2-1 pack per day: Nicotine patch 





DVT Prophylaxis: Lovenox, Coumadin 





Code Status: LEVEL I, FULL





Dispo: Discharge to home w/ [facility]- at this time, patient is medically stable for 

discharge, but is homeless- Discharge pending housing  








 (María Sheridan ., PA-C)


Attending Attestation:


Pt seen/examined, chart reviewed, care plan d/w TATI Sheridan.


I agree w/ the petty components of her documentation.





During my visit the patient was very frustrated.


He felt that our team was "throwing him out of the hospital."


He initially told me he wanted to stay until his INR was >2, then stated he 

wanted to leave, and finally towards the end stated "I have no place to go."


He previously lived with his girlfriend, but when asked where he was going to 

live following the hospital stay he said "I don't know."


He voiced frustration when I told him he would probably need to be out of work 

for about 1 week. 


When asked about drugs he admitted to meth use about 2-3 weeks ago.





Following the conclusion of our discussion he stated he felt better because he 

had more information regarding his medical conditions. 





VSS


no fever





gen- nad but quite agitated


neck - no JVD


mouth - no sores or lesions


heart - RRR


lungs - rales with decreased BS right base


abd - soft, no HSM


ext - right upper extremity mildly swollen; palpable cord over cephalic vein 

over the biceps; PICC in place medial aspect of arm 





INR 1.3





A/P:


1.  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


2.  underlying risk factor for #1 - drug abuse; fortunately HIV negative


3.  cont IV PCN for #1


4.  drug abuse


5.  homelessness / complex social situation


6.  h/o recent PE 


7.  right upper extremity DVT - provoked in setting of recent IV


8.  transition to lovenox 1mg/kg BID and cont coumadin with daily INR





appreciate social work with their help on this complicated social situation 





hold discharge today until dispo plan (is where he will live/stay after 

discharge) is sorted out 





Paul Desouza MD


 (Paul Desouza MD)

## 2017-08-03 NOTE — HOSPITALIST PROGRESS NOTE
Hospitalist Progress Note


Date of Service


Aug 3, 2017.


 (María Sheridan ., PA-C)





Subjective


Pt evaluation today including:  conversation w/ patient, physical exam, chart 

review, lab review, review of inpatient medication list


Voiding:  no voiding problems, no incontinence


Patient states he is feeling well. 


Calm and respectful today- apologetic about yesterday, states he is under a lot 

of stress with everything going on.  


+bilateral ankle edema- TEDs. Venous US negative for DVT on 7/27. 

Anticoagulated. 


Admits to using meth about 1 month ago. 


   Got his Suboxone from the streets. Last used about 2 months ago. 


   Ambitious about staying clean this time around. 


   Follows with IOP- will continue to do so at discharge. 


States he will have housing arrangements by Monday or Tuesday. 


   He states this will be a stable environment for him with little triggers to 

relapse.  





Patient denies any fever, chills, sweats, lightheadedness, dizziness, vision 

changes, CP, palpitations, edema, SOB, wheezing, cough, abdominal pain, nausea, 

vomiting, diarrhea, urinary symptoms, melena, numbness/tingling, weakness, 

muscle/joint pain, anxiety/depression, active bleeding, or new skin 

discoloration/changes. 


 (María Sheridan ., PA-C)





Medications





Current Inpatient Medications








 Medications


  (Trade)  Dose


 Ordered  Sig/Jae


 Route  Start Time


 Stop Time Status Last Admin


Dose Admin


 


 Acetaminophen


  (Tylenol Tab)  650 mg  Q4H  PRN


 PO  7/27/17 17:30


 8/26/17 17:29   


 


 


 Al Hydrox/Mg


 Hydrox/Simethicone


  (Maalox Max Susp)  15 ml  Q4H  PRN


 PO  7/27/17 17:30


 8/26/17 17:29   


 


 


 Magnesium


 Hydroxide


  (Milk Of


 Magnesia Susp)  30 ml  Q12H  PRN


 PO  7/27/17 17:30


 8/26/17 17:29   


 


 


 Ondansetron HCl


  (Zofran Inj)  4 mg  Q6H  PRN


 IV  7/27/17 17:30


 8/26/17 17:29   


 


 


 Polyethylene


  (Miralax Powder


 Packet)  17 gm  DAILY  PRN


 PO  7/27/17 17:30


 8/26/17 17:29   


 


 


 Gabapentin


  (Neurontin Cap)  200 mg  TID


 PO  7/27/17 21:00


 8/26/17 20:59  8/3/17 08:16


200 MG


 


 Oxycodone HCl


  (Roxicodone


 Immediate Rel Tab)  5 mg  Q4H  PRN


 PO  7/27/17 17:30


 8/10/17 17:29  8/3/17 11:00


5 MG


 


 Nicotine


  (Nicoderm Cq


 14MG Patch)  1 patch  QAM


 TD  7/27/17 18:00


 8/26/17 17:59  8/3/17 08:18


1 PATCH


 


 Miscellaneous


  (Remove Nicoderm


 Patch)  1 ea  HS


 N/A  7/27/17 21:00


 8/26/17 20:59  8/2/17 21:27


1 EA


 


 Acetaminophen 650


 mg/Empty Bag  65 ml @ 


 260 mls/hr  Q6H  PRN


 IV  7/28/17 12:45


 8/27/17 12:44   


 


 


 Celecoxib


  (CeleBREX CAP)  100 mg  BID


 PO  7/28/17 12:45


 8/27/17 12:44  8/3/17 08:16


100 MG


 


 Tramadol HCl


  (Ultram Tab)  50 mg  Q4H  PRN


 PO  7/28/17 12:45


 8/27/17 12:44  8/2/17 21:39


50 MG


 


 Heparin Sodium/


 Dextrose  500 ml @ 


 25 mls/hr  Q20H PRN


 IV  7/29/17 00:15


 8/28/17 00:14 Future Hold 8/2/17 07:16


25 MLS/HR


 


 Heparin Sodium


  (Porcine)


  (Heparin 10 Unit/


 ml 5 ml Flush)  5 ml  PRN  PRN


 FLUSH  7/31/17 17:00


 8/30/17 16:59  8/3/17 06:24


5 ML


 


 Penicillin G


 Potassium 6 mu/


 Dextrose  262 ml @ 


 100 mls/hr  Q6@0400,1000,1600,2200


 IV  8/1/17 16:00


 8/15/17 15:59  8/3/17 10:54


100 MLS/HR


 


 Warfarin Sodium


  (Coumadin Tab)  5 mg  DAILY@16


 PO  8/1/17 18:00


 8/31/17 17:59  8/2/17 16:03


5 MG


 


 Enoxaparin Sodium


  (Lovenox Inj)  90 mg  Q12


 SQ  8/2/17 12:00


 9/1/17 11:59  8/3/17 08:17


90 MG








 (Murarik, María ., PA-C)





Objective


Vital Signs











  Date Time  Temp Pulse Resp B/P (MAP) Pulse Ox O2 Delivery O2 Flow Rate FiO2


 


8/3/17 07:20 36.6 78 20 130/86 (101) 97 Room Air  


 


8/3/17 00:00      Room Air  


 


8/2/17 22:34 36.3 83 19 153/98 (116) 99 Room Air  


 


8/2/17 16:00     100 Room Air  


 


8/2/17 15:33 36.4 99 20 156/97 (116) 100 Room Air  








 (María Sheridan PA-C)





Physical Exam


General Appearance:  WD/WN, no apparent distress


Eyes:  PERRL


ENT:  hearing grossly normal


Neck:  supple


Respiratory/Chest:  lungs clear, no respiratory distress, no accessory muscle 

use, + decreased breath sounds (R lung base )


Cardiovascular:  regular rate, rhythm


Abdomen:  normal bowel sounds, non tender, soft


Extremities:  normal range of motion, no pedal edema, no calf tenderness


Neurologic/Psychiatric:  alert, normal mood/affect, oriented x 3


Skin:  normal color, warm/dry, no rash


 (María Sheridan PA-C)





Laboratory Results





Last 24 Hours








Test


  8/3/17


05:31


 


White Blood Count 16.89 K/uL 


 


Red Blood Count 4.33 M/uL 


 


Hemoglobin 12.9 g/dL 


 


Hematocrit 37.1 % 


 


Mean Corpuscular Volume 85.7 fL 


 


Mean Corpuscular Hemoglobin 29.8 pg 


 


Mean Corpuscular Hemoglobin


Concent 34.8 g/dl 


 


 


RDW Standard Deviation 35.3 fL 


 


RDW Coefficient of Variation 11.2 % 


 


Platelet Count 274 K/uL 


 


Mean Platelet Volume 9.3 fL 


 


Prothrombin Time 16.3 SECONDS 


 


Prothromb Time International


Ratio 1.5 


 


 


Activated Partial


Thromboplast Time 47.5 SECONDS 


 


 


Partial Thromboplastin Ratio 1.8 


 


Sodium Level 138 mmol/L 


 


Potassium Level 3.8 mmol/L 


 


Chloride Level 103 mmol/L 


 


Carbon Dioxide Level 31 mmol/L 


 


Anion Gap 4.0 mmol/L 


 


Blood Urea Nitrogen 10 mg/dl 


 


Creatinine 1.20 mg/dl 


 


Est Creatinine Clear Calc


Drug Dose 92.3 ml/min 


 


 


Estimated GFR (


American) 90.9 


 


 


Estimated GFR (Non-


American 78.4 


 


 


BUN/Creatinine Ratio 8.1 


 


Random Glucose 108 mg/dl 


 


Calcium Level 9.1 mg/dl 








 (María Sheridan PA-C)





Assessment and Plan


Patient is a pleasant 35 y/o male, with PMHx of PE, lupus anticoagulant positive

, sickle-cell trait, h/o drug abuse, tobacco abuse, depression/anxiety/PTSD, 

who was a direct admission from Dr. Hudson's office due to persistent pleuritic 

right-sided CP here for additional workup. 





Persistent pleuritic right-sided CP:


- Admit to tele for cardiac monitoring- no acute events


   -- Transferred to med/surg on 7/29


- Cardiac enzymes- negative 


- CTA reviewed 


- Bilateral lower extremity venous US- no evidence of DVT 


- ECHO to r/o endocarditis


* 1. Normal LV size and wall thickness.


* 2. Normal LV systolic funciton.  LVEF 60-65%.  No regional wall motion 

abnormalities.


* 3. Normal RV size and function.


* 4. No significant valvular pathology.  No vegetations noted.


* 5. Positive bubble study for interatrial shunt.


* 6. No prior studies for comparison. 


- IV Heparin bridging w/ PO Coumadin due to hypercoagulable state


   -- Following INR- 1.5 today


   -- Transitioned to Lovenox 1 mg/kg q12 on 08/02 in preparation for discharge

- Lovenox teaching completed  


- Hypercoagulable workup completed- lupus anticoagulant positive


   -- Due to hypercoagulable state, Xarelto is NOT recommend. Discussed w/ 

patient, will be placed on Coumadin with routine INR f/u 


- Broad spectrum IV antibiotics w/ IV Zosyn and Vancomycin- MRSA swab negative 


   -- Transitioned to PO Augmentin on 7/30- d/c'd on 7/31 and transitioned to 

IV Unasyn due to BCx, transitioned to IV Penicillin G on 08/1 


- Oxycodone, Celebrex BID, and Tramadol, Tylenol PRN for pain control  


- BCx- growing aerobic actinomycete 


   -- Repeat BCx on 7/31- NGTD


   -- ID following- begin IV Unasyn on 7/31, transition to Penicillin G on 08/ 01 


- UA, HIV, TB, urine legionella- negative 


- Consult pulmonary, appreciate recommendations


   -- No thoracentesis  


- Consult ID, appreciate recommendations 


   -- IV Penicillin G 24 MU/day x6 weeks, then PO antibiotics x1 year 


- Consult thoracic surgery, appreciate recommendations 


   -- No decortication at this time


   -- Repeat CT scan w/out contrast on 08/07 to assess antibiotic response


- PICC on 07/31


   -- NEEDS FILTER DO TO INTRA-ATRIAL SHUNT 


   -- RUE US on 08/02- Mid right cephalic vein thrombus.


      -- Spoke w/ IV team, not associated to PICC- no need for PICC replacement 


      -- On anticoagulation 





Anxiety/depression, PTSD, explosive disorder: Continue Gabapentin 100 mg TID 





h/o drug abuse- use to snort/smoke Meth: 


- Denies IVDA 


- States he is no longer on Suboxone- last snorted meth 1 month ago  





h/o tobacco abuse- 1/2-1 pack per day: Nicotine patch 





DVT Prophylaxis: Lovenox, Coumadin 





Code Status: LEVEL I, FULL





Dispo: Discharge to home w/ [facility]- at this time, patient is medically stable for 

discharge, but is homeless- Discharge pending housing  


 (María Sheridan ., PAJohnC)


Attending Attestation:


Pt seen/examined, chart reviewed, care plan d/w TATI Sheridan.


I agree w/ the petty components of her documentation.





Feels better today psychologically - less "stressed."


He states he will have housing by Monday of this coming week (apartment). 


No new complaints. 





VSS


no fever





gen- nad


neck - no JVD


heart - RRR


lungs - rales with decreased BS right base


abd - soft, no HSM


ext - right upper extremity mildly swollen but improved; palpable cord over 

cephalic vein over the biceps; PICC in place medial aspect of arm 





A/P:


1.  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


2.  underlying risk factor for #1 - drug abuse; fortunately HIV negative


3.  cont IV PCN for #1


4.  drug abuse (meth)


5.  homelessness / complex social situation


6.  h/o recent PE 


7.  right upper extremity DVT - provoked in setting of recent IV


8.  continue lovenox 1mg/kg BID and cont coumadin with daily INR; needs 2-day 

overlap





appreciate social work with their help on this complicated social situation 





dispo - to apartment in [place]. this coming Monday





Paul Desouza MD


 (Paul Desouza MD)

## 2017-08-03 NOTE — PROGRESS NOTE
Progress Note


Date of Service


Aug 3, 2017.





Progress Note


THORACIC SURGERY PROGRESS NOTE:





Mr. Romero was seen this morning.  Continued complaint of pain but states it 

only occurs at night.  He still has decreased breath sounds on auscultation.  

Patient has problems with this position as he currently has no residents.  He 

is going to require intravenous penicillin.  At this point I would like to 

repeat a CT scan without contrast in 4 days on Monday, August 7.  This will 

help us determine whether or not he is require thoracoscopic decortication or 

will respond to antibiotics.

## 2017-08-03 NOTE — MEDICAL STUDENT: [facility]
Med Student Progress Note


Date of Service


Aug 3, 2017.





Subjective


Pt evaluation today including:  conversation w/ patient


Pain:  improving since yesterday


Brandon is feeling alot more calm today. He says that yesterday he was feeling 

very anxious about being discharged and worried about where he would live. He 

apologized for his behavior, and said that this has happened in the past when 

he gets worried about things. I spoke to him about considering seeking help as 

improving this behavior may be beneficial to his future careers and this will 

help him be more financially stable and get back on track with his life. He 

agreed with this and said that he realizes now that these episodes aren't 

normal and may worsen situations. Otherwise, we discussed what his situation 

was with housing and he is still having difficulty obtaining housing. He 

disclosed to me that the reason he was concerned about how he acquired this 

infection was because he was reading some information on [employer] about 

coccidiomycosis contamination in meth in the recent news. I discussed with him 

what this was and what type of patients typically get these infections. He was 

also concerned that he messed up the dressing of his PICC line during his 

sleep. At the end of our conversation he appeared to be very intent on becoming 

clean of substance use, I hope that this is genuine and that he can continue 

with this attitude.





Review of Systems


Constitutional:  + chills, No fever, No sweats, No weakness, No fatigue


ENT:  No sore throat, No problem reported


Respiratory:  + shortness of breath, + problem reported (Pain with breathing), 

No cough, No sputum, No wheezing, No hemoptysis


Cardiac:  + edema (swelling of the ankles bilaterally), No chest pain, No 

orthopnea, No PND, No claudication, No palpitations


Abdomen:  No pain, No nausea, No vomiting, No diarrhea, No constipation, No 

problem reported


Musculoskeletal:  + problem reported (Midline back pain at about T12 level, 

improving since yesterday)


Psychiatric:  + anxiety (Improved since yesterday)





Objective


Vital Signs











  Date Time  Temp Pulse Resp B/P (MAP) Pulse Ox O2 Delivery O2 Flow Rate FiO2


 


8/3/17 08:22      Room Air  


 


8/3/17 07:20 36.6 78 20 130/86 (101) 97 Room Air  


 


8/3/17 00:00      Room Air  


 


8/2/17 22:34 36.3 83 19 153/98 (116) 99 Room Air  


 


8/2/17 16:00     100 Room Air  


 


8/2/17 15:33 36.4 99 20 156/97 (116) 100 Room Air  











Physical Exam


General Appearance:  WD/WN, no apparent distress


ENT:  hearing grossly normal, pharynx normal


Neck:  supple, trachea midline


Respiratory/Chest:  chest non-tender, lungs clear, normal breath sounds, no 

respiratory distress, no accessory muscle use


Cardiovascular:  regular rate, rhythm, no edema, no gallop, no JVD, no murmur


Abdomen:  non tender, soft, no pulsatile mass


Extremities:  + pedal edema (non-pitting edema of the ankles)


Neurologic/Psychiatric:  alert, normal mood/affect, oriented x 3


Skin:  normal color, warm/dry, no rash


Comments:


Lung exam is greatly improved since yesterday





Laboratory Results





Last 24 Hours








Test


  8/3/17


05:31


 


White Blood Count 16.89 K/uL 


 


Red Blood Count 4.33 M/uL 


 


Hemoglobin 12.9 g/dL 


 


Hematocrit 37.1 % 


 


Mean Corpuscular Volume 85.7 fL 


 


Mean Corpuscular Hemoglobin 29.8 pg 


 


Mean Corpuscular Hemoglobin


Concent 34.8 g/dl 


 


 


RDW Standard Deviation 35.3 fL 


 


RDW Coefficient of Variation 11.2 % 


 


Platelet Count 274 K/uL 


 


Mean Platelet Volume 9.3 fL 


 


Prothrombin Time 16.3 SECONDS 


 


Prothromb Time International


Ratio 1.5 


 


 


Activated Partial


Thromboplast Time 47.5 SECONDS 


 


 


Partial Thromboplastin Ratio 1.8 


 


Sodium Level 138 mmol/L 


 


Potassium Level 3.8 mmol/L 


 


Chloride Level 103 mmol/L 


 


Carbon Dioxide Level 31 mmol/L 


 


Anion Gap 4.0 mmol/L 


 


Blood Urea Nitrogen 10 mg/dl 


 


Creatinine 1.20 mg/dl 


 


Est Creatinine Clear Calc


Drug Dose 92.3 ml/min 


 


 


Estimated GFR (


American) 90.9 


 


 


Estimated GFR (Non-


American 78.4 


 


 


BUN/Creatinine Ratio 8.1 


 


Random Glucose 108 mg/dl 


 


Calcium Level 9.1 mg/dl 











Assessment and Plan


Assessment and Plan:


Brandon is a 33 yo  male with a history notable for substance 

abuse and hypercoagulability, including a PE who presented for shortness of 

breath and was diagnosed with pneumonia. Blood cultures at admission grew 

Actinomyces. He is being treated with Penicillin IV, Enoxaparin and Warfarin. 

His INR today was 1.5 and PTT of 47.5. The PICC line is still present in his 

right upper arm. His recent blood cultures on 7/31 have grown no bacteria to 

date.





Pneumonia, Actinomyces:


- Pain is improving, breathing is non-labored


- Blood culture has not grown any bacteria since 7/31


- Lungs are improving on physical exam


- Continues to have ankle swelling, may be due to IV fluids





- Continue with IV Penicillin, plan for outpatient IV penicillin for 4-6 weeks 

followed by 6-12 mos as oral medication only


- Per thoracic surgery, perform Chest CT monday to assess affect of therapy on 

pulmonary fluid accumulation. 


- Consult case management for help with housing situation


- Plan to insert new PICC line prior to discharge, as well as provide education 

on managing a PICC line








Hypercoagulable state:


- INR improved to 1.5


- PTT of 47.5 





- Continue with enoxaparin and Warfarin anticoagulation


- Plan for routine INR checks


Continued [facility] stay due to:  other (insertion of new PICC line)

## 2017-08-04 NOTE — INFECTIOUS DISEASE PROGRESS NT
Progress Note


Date of Service


Aug 4, 2017.





Subjective


Pt evaluation today including:  conversation w/ patient, physical exam, chart 

review, lab review, review of studies, conversation w/ consultant, review of 

inpatient medication list


Feeling slightly better with less pain. Tolerating Abx. Remains afebrile. No 

other new complaints.





   All Other Systems:  Reviewed and Negative





Medications





Current Inpatient Medications








 Medications


  (Trade)  Dose


 Ordered  Sig/Jae


 Route  Start Time


 Stop Time Status Last Admin


Dose Admin


 


 Acetaminophen


  (Tylenol Tab)  650 mg  Q4H  PRN


 PO  7/27/17 17:30


 8/26/17 17:29   


 


 


 Al Hydrox/Mg


 Hydrox/Simethicone


  (Maalox Max Susp)  15 ml  Q4H  PRN


 PO  7/27/17 17:30


 8/26/17 17:29   


 


 


 Magnesium


 Hydroxide


  (Milk Of


 Magnesia Susp)  30 ml  Q12H  PRN


 PO  7/27/17 17:30


 8/26/17 17:29   


 


 


 Ondansetron HCl


  (Zofran Inj)  4 mg  Q6H  PRN


 IV  7/27/17 17:30


 8/26/17 17:29   


 


 


 Polyethylene


  (Miralax Powder


 Packet)  17 gm  DAILY  PRN


 PO  7/27/17 17:30


 8/26/17 17:29   


 


 


 Gabapentin


  (Neurontin Cap)  200 mg  TID


 PO  7/27/17 21:00


 8/26/17 20:59  8/4/17 13:29


200 MG


 


 Oxycodone HCl


  (Roxicodone


 Immediate Rel Tab)  5 mg  Q4H  PRN


 PO  7/27/17 17:30


 8/10/17 17:29  8/4/17 08:59


5 MG


 


 Nicotine


  (Nicoderm Cq


 14MG Patch)  1 patch  QAM


 TD  7/27/17 18:00


 8/26/17 17:59  8/4/17 08:55


1 PATCH


 


 Miscellaneous


  (Remove Nicoderm


 Patch)  1 ea  HS


 N/A  7/27/17 21:00


 8/26/17 20:59  8/3/17 21:06


1 EA


 


 Acetaminophen 650


 mg/Empty Bag  65 ml @ 


 260 mls/hr  Q6H  PRN


 IV  7/28/17 12:45


 8/27/17 12:44   


 


 


 Celecoxib


  (CeleBREX CAP)  100 mg  BID


 PO  7/28/17 12:45


 8/27/17 12:44  8/4/17 08:54


100 MG


 


 Tramadol HCl


  (Ultram Tab)  50 mg  Q4H  PRN


 PO  7/28/17 12:45


 8/27/17 12:44  8/2/17 21:39


50 MG


 


 Heparin Sodium/


 Dextrose  500 ml @ 


 25 mls/hr  Q20H PRN


 IV  7/29/17 00:15


 8/28/17 00:14 Future Hold 8/2/17 07:16


25 MLS/HR


 


 Heparin Sodium


  (Porcine)


  (Heparin 10 Unit/


 ml 5 ml Flush)  5 ml  PRN  PRN


 FLUSH  7/31/17 17:00


 8/30/17 16:59  8/4/17 08:49


5 ML


 


 Penicillin G


 Potassium 6 mu/


 Dextrose  262 ml @ 


 100 mls/hr  Q6@0400,1000,1600,2200


 IV  8/1/17 16:00


 8/15/17 15:59  8/4/17 10:38


100 MLS/HR


 


 Warfarin Sodium


  (Coumadin Tab)  5 mg  DAILY@16


 PO  8/1/17 18:00


 8/31/17 17:59  8/3/17 17:18


5 MG


 


 Enoxaparin Sodium


  (Lovenox Inj)  90 mg  Q12


 SQ  8/2/17 12:00


 9/1/17 11:59  8/4/17 08:53


90 MG











Objective


Vital Signs











  Date Time  Temp Pulse Resp B/P (MAP) Pulse Ox O2 Delivery O2 Flow Rate FiO2


 


8/4/17 09:09 36.7 100 20 127/85 (99) 96 Room Air  


 


8/4/17 08:00      Room Air  


 


8/4/17 00:00      Room Air  


 


8/3/17 22:18 36.4 99 18 136/95 (109) 96 Room Air  


 


8/3/17 20:00      Room Air  


 


8/3/17 16:15      Room Air  


 


8/3/17 15:30 37.1 107 18 152/90 (110) 98 Room Air  











Physical Exam


General Appearance:  WD/WN, no apparent distress


Eyes:  normal inspection, EOMI, sclerae normal


ENT:  normal ENT inspection, pharynx normal


Neck:  supple, no adenopathy, trachea midline


Respiratory/Chest:  chest non-tender, no respiratory distress, no accessory 

muscle use, + crackles


Cardiovascular:  regular rate, rhythm, no gallop, no murmur


Abdomen:  normal bowel sounds, non tender, soft, no organomegaly


Extremities:  non-tender, no calf tenderness


Neurologic/Psychiatric:  alert, oriented x 3


Skin:  normal color, no rash


Lymphatic:  no adenopathy





Laboratory Results





Last 24 Hours








Test


  8/4/17


05:19


 


White Blood Count 11.05 K/uL 


 


Red Blood Count 4.38 M/uL 


 


Hemoglobin 13.1 g/dL 


 


Hematocrit 37.9 % 


 


Mean Corpuscular Volume 86.5 fL 


 


Mean Corpuscular Hemoglobin 29.9 pg 


 


Mean Corpuscular Hemoglobin


Concent 34.6 g/dl 


 


 


RDW Standard Deviation 35.4 fL 


 


RDW Coefficient of Variation 11.3 % 


 


Platelet Count 260 K/uL 


 


Mean Platelet Volume 9.4 fL 


 


Prothrombin Time 19.7 SECONDS 


 


Prothromb Time International


Ratio 1.8 


 


 


Activated Partial


Thromboplast Time 51.0 SECONDS 


 


 


Partial Thromboplastin Ratio 2.0 


 


Sodium Level 139 mmol/L 


 


Potassium Level 4.2 mmol/L 


 


Chloride Level 104 mmol/L 


 


Carbon Dioxide Level 31 mmol/L 


 


Anion Gap 4.0 mmol/L 


 


Blood Urea Nitrogen 11 mg/dl 


 


Creatinine 1.00 mg/dl 


 


Est Creatinine Clear Calc


Drug Dose 110.8 ml/min 


 


 


Estimated GFR (


American) 113.3 


 


 


Estimated GFR (Non-


American 97.8 


 


 


BUN/Creatinine Ratio 10.9 


 


Random Glucose 108 mg/dl 


 


Calcium Level 9.4 mg/dl 











Assessment and Plan


Patient with chest pain,  pleuritic in nature, and possible pneumonia of the 

RML and RLL with Actinomyces growing in 1/2 initial blood cultures. He will be 

discharged for outpatient treatment with IV penicillin as discussed with 

hospitalist service.  Will follow up with patient in the office.

## 2017-08-04 NOTE — SURGERY PROGRESS NOTE
DATE: 08/04/2017

 

DATE:  08/04/2017  

 

Mr. Romero was seen today on 08/04/2017.  He thinks his pain is better. 

He has no trouble breathing.  He has been afebrile.  His white count is down

a bit to 11,050.  His lungs actually sound a bit better to me.

 

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  We are

going to continue IV penicillin and repeat a CT scan on him Monday. 

Depending on his response he  may require a decortication.  We will make a

decision at that time.  He should be allowed to go home when he is able to

next week.

## 2017-08-04 NOTE — HOSPITALIST PROGRESS NOTE
Hospitalist Progress Note


Date of Service


Aug 4, 2017.


 (María Sheridan ., BETH)





Subjective


Pt evaluation today including:  conversation w/ patient, physical exam, chart 

review, lab review, review of inpatient medication list


Voiding:  no voiding problems, no incontinence


No complaints/changes. 





Patient denies any fever, chills, sweats, lightheadedness, dizziness, vision 

changes, CP, palpitations, edema, SOB, wheezing, cough, abdominal pain, nausea, 

vomiting, diarrhea, urinary symptoms, melena, numbness/tingling, weakness, 

muscle/joint pain, anxiety/depression, active bleeding, or new skin 

discoloration/changes. 





Per nursing, patient stated he left for a "quick trip to [employer]" yesterday. 


 (María Sheridan ., PA-C)





Medications





Current Inpatient Medications








 Medications


  (Trade)  Dose


 Ordered  Sig/Jae


 Route  Start Time


 Stop Time Status Last Admin


Dose Admin


 


 Acetaminophen


  (Tylenol Tab)  650 mg  Q4H  PRN


 PO  7/27/17 17:30


 8/26/17 17:29   


 


 


 Al Hydrox/Mg


 Hydrox/Simethicone


  (Maalox Max Susp)  15 ml  Q4H  PRN


 PO  7/27/17 17:30


 8/26/17 17:29   


 


 


 Magnesium


 Hydroxide


  (Milk Of


 Magnesia Susp)  30 ml  Q12H  PRN


 PO  7/27/17 17:30


 8/26/17 17:29   


 


 


 Ondansetron HCl


  (Zofran Inj)  4 mg  Q6H  PRN


 IV  7/27/17 17:30


 8/26/17 17:29   


 


 


 Polyethylene


  (Miralax Powder


 Packet)  17 gm  DAILY  PRN


 PO  7/27/17 17:30


 8/26/17 17:29   


 


 


 Gabapentin


  (Neurontin Cap)  200 mg  TID


 PO  7/27/17 21:00


 8/26/17 20:59  8/4/17 08:54


200 MG


 


 Oxycodone HCl


  (Roxicodone


 Immediate Rel Tab)  5 mg  Q4H  PRN


 PO  7/27/17 17:30


 8/10/17 17:29  8/4/17 08:59


5 MG


 


 Nicotine


  (Nicoderm Cq


 14MG Patch)  1 patch  QAM


 TD  7/27/17 18:00


 8/26/17 17:59  8/4/17 08:55


1 PATCH


 


 Miscellaneous


  (Remove Nicoderm


 Patch)  1 ea  HS


 N/A  7/27/17 21:00


 8/26/17 20:59  8/3/17 21:06


1 EA


 


 Acetaminophen 650


 mg/Empty Bag  65 ml @ 


 260 mls/hr  Q6H  PRN


 IV  7/28/17 12:45


 8/27/17 12:44   


 


 


 Celecoxib


  (CeleBREX CAP)  100 mg  BID


 PO  7/28/17 12:45


 8/27/17 12:44  8/4/17 08:54


100 MG


 


 Tramadol HCl


  (Ultram Tab)  50 mg  Q4H  PRN


 PO  7/28/17 12:45


 8/27/17 12:44  8/2/17 21:39


50 MG


 


 Heparin Sodium/


 Dextrose  500 ml @ 


 25 mls/hr  Q20H PRN


 IV  7/29/17 00:15


 8/28/17 00:14 Future Hold 8/2/17 07:16


25 MLS/HR


 


 Heparin Sodium


  (Porcine)


  (Heparin 10 Unit/


 ml 5 ml Flush)  5 ml  PRN  PRN


 FLUSH  7/31/17 17:00


 8/30/17 16:59  8/4/17 08:49


5 ML


 


 Penicillin G


 Potassium 6 mu/


 Dextrose  262 ml @ 


 100 mls/hr  Q6@0400,1000,1600,2200


 IV  8/1/17 16:00


 8/15/17 15:59  8/4/17 10:38


100 MLS/HR


 


 Warfarin Sodium


  (Coumadin Tab)  5 mg  DAILY@16


 PO  8/1/17 18:00


 8/31/17 17:59  8/3/17 17:18


5 MG


 


 Enoxaparin Sodium


  (Lovenox Inj)  90 mg  Q12


 SQ  8/2/17 12:00


 9/1/17 11:59  8/4/17 08:53


90 MG








 (María Sheridan, BETH)





Objective


Vital Signs











  Date Time  Temp Pulse Resp B/P (MAP) Pulse Ox O2 Delivery O2 Flow Rate FiO2


 


8/4/17 09:09 36.7 100 20 127/85 (99) 96 Room Air  


 


8/4/17 08:00      Room Air  


 


8/4/17 00:00      Room Air  


 


8/3/17 22:18 36.4 99 18 136/95 (109) 96 Room Air  


 


8/3/17 20:00      Room Air  


 


8/3/17 16:15      Room Air  


 


8/3/17 15:30 37.1 107 18 152/90 (110) 98 Room Air  








 (María Sheridan PA-C)





Physical Exam


General Appearance:  WD/WN, no apparent distress


Eyes:  PERRL


ENT:  hearing grossly normal


Neck:  supple


Respiratory/Chest:  no respiratory distress, no accessory muscle use, + 

decreased breath sounds (R lung base ), + crackles (R lung base )


Cardiovascular:  regular rate, rhythm


Abdomen:  normal bowel sounds, non tender, soft


Extremities:  no pedal edema, no calf tenderness, + pertinent finding (TEDs on )


Neurologic/Psychiatric:  alert, normal mood/affect, oriented x 3


Skin:  normal color, warm/dry, no rash


 (María Sheridan PA-C)





Laboratory Results





Last 24 Hours








Test


  8/4/17


05:19


 


White Blood Count 11.05 K/uL 


 


Red Blood Count 4.38 M/uL 


 


Hemoglobin 13.1 g/dL 


 


Hematocrit 37.9 % 


 


Mean Corpuscular Volume 86.5 fL 


 


Mean Corpuscular Hemoglobin 29.9 pg 


 


Mean Corpuscular Hemoglobin


Concent 34.6 g/dl 


 


 


RDW Standard Deviation 35.4 fL 


 


RDW Coefficient of Variation 11.3 % 


 


Platelet Count 260 K/uL 


 


Mean Platelet Volume 9.4 fL 


 


Prothrombin Time 19.7 SECONDS 


 


Prothromb Time International


Ratio 1.8 


 


 


Activated Partial


Thromboplast Time 51.0 SECONDS 


 


 


Partial Thromboplastin Ratio 2.0 


 


Sodium Level 139 mmol/L 


 


Potassium Level 4.2 mmol/L 


 


Chloride Level 104 mmol/L 


 


Carbon Dioxide Level 31 mmol/L 


 


Anion Gap 4.0 mmol/L 


 


Blood Urea Nitrogen 11 mg/dl 


 


Creatinine 1.00 mg/dl 


 


Est Creatinine Clear Calc


Drug Dose 110.8 ml/min 


 


 


Estimated GFR (


American) 113.3 


 


 


Estimated GFR (Non-


American 97.8 


 


 


BUN/Creatinine Ratio 10.9 


 


Random Glucose 108 mg/dl 


 


Calcium Level 9.4 mg/dl 








 (María Sheridan PA-C)





Assessment and Plan


Patient is a pleasant 35 y/o male, with PMHx of PE, lupus anticoagulant positive

, sickle-cell trait, h/o drug abuse, tobacco abuse, depression/anxiety/PTSD, 

who was a direct admission from Dr. Hudson's office due to persistent pleuritic 

right-sided CP here for additional workup. 





Persistent pleuritic right-sided CP:


- Admit to [facility] for cardiac monitoring- no acute events


   -- Transferred to med/surg on 7/29


- Cardiac enzymes- negative 


- CTA reviewed 


- Bilateral lower extremity venous US- no evidence of DVT 


- ECHO to r/o endocarditis


* 1. Normal LV size and wall thickness.


* 2. Normal LV systolic funciton.  LVEF 60-65%.  No regional wall motion 

abnormalities.


* 3. Normal RV size and function.


* 4. No significant valvular pathology.  No vegetations noted.


* 5. Positive bubble study for interatrial shunt.


* 6. No prior studies for comparison. 


- IV Heparin bridging w/ PO Coumadin due to hypercoagulable state


   -- Following INR- 1.8 today


   -- Transitioned to Lovenox 1 mg/kg q12 on 08/02 in preparation for discharge

- Lovenox teaching completed  


- Hypercoagulable workup completed- lupus anticoagulant positive


   -- Due to hypercoagulable state, Xarelto is NOT recommend. Discussed w/ 

patient, will be placed on Coumadin with routine INR f/u 


- Broad spectrum IV antibiotics w/ IV Zosyn and Vancomycin- MRSA swab negative 


   -- Transitioned to PO Augmentin on 7/30- d/c'd on 7/31 and transitioned to 

IV Unasyn due to BCx, transitioned to IV Penicillin G on 08/1 


- Oxycodone, Celebrex BID, and Tramadol, Tylenol PRN for pain control  


- BCx- growing aerobic actinomycete 


   -- Repeat BCx on 7/31- NGTD


   -- ID following- begin IV Unasyn on 7/31, transition to Penicillin G on 08/ 01 


- UA, HIV, TB, urine legionella- negative 


- Consult pulmonary, appreciate recommendations


   -- No thoracentesis  


- Consult ID, appreciate recommendations 


   -- IV Penicillin G 24 MU/day x6 weeks, then PO antibiotics x1 year 


- Consult thoracic surgery, appreciate recommendations 


   -- No decortication at this time


   -- Repeat CT scan w/out contrast on 08/07 to assess antibiotic response


- PICC on 07/31


   -- NEEDS FILTER DO TO INTRA-ATRIAL SHUNT 


   -- RUE US on 08/02- Mid right cephalic vein thrombus.


      -- Spoke w/ IV team, not associated to PICC- no need for PICC replacement 


      -- On anticoagulation 





Anxiety/depression, PTSD, explosive disorder: Continue Gabapentin 100 mg TID 





h/o drug abuse- use to snort/smoke Meth: 


- Denies IVDA 


- States he is no longer on Suboxone- last snorted meth 1 month ago 


- Patient leaving facility property (went to [facility] on 08/03), ?suspicious 

activity and episodes of tachycardia- check [facility] drug screen


   -- If wishes to go outside, MUST have employee supervision   





h/o tobacco abuse- 1/2-1 pack per day: Nicotine patch 





DVT Prophylaxis: Lovenox, Coumadin 





Code Status: LEVEL I, FULL





Dispo: Discharge to home w/ [facility]- Patient is medically stable for discharge, but 

is homeless (states will have housing Mon or Tues.)- Discharge pending housing  


 (María Sheridan ., PAJohnC)


Attending Attestation:


Pt seen/examined, chart reviewed, care plan d/w TATI Sheridan.


I agree w/ the petty components of her documentation.





We had lengthy discussion about NOT leaving hospital property.


I told him frankly that is NOT allowed.


I also told him he can NOT leave the medical patton without supervision by a 

hospital employee.


He is now saying that he wishes to discharge to [place], [place] where is 

best friend lives.


He plans to live with this person.


He states his friend is a .


When asked if his girlfriend will be going with him he stated no. 





No new physical complaints today. 





VSS


no fever





gen- nad


neck - no JVD


mouth - new burn marks on buccal mucosa 


heart - RRR


lungs - rales with decreased BS right base


abd - soft, no HSM


ext - right upper extremity mildly swollen but improved; PICC in place 





A/P:


1.  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


2.  underlying risk factor for #1 - drug abuse; fortunately HIV negative


3.  cont IV PCN for #1


4.  drug abuse (meth); I am suspicious he could be doing this on trips 

downstairs and even during his trip to [employer] yesterday


5.  homelessness / complex social situation


6.  h/o recent PE 


7.  right upper extremity DVT - provoked in setting of recent IV


8.  continue lovenox 1mg/kg BID and cont coumadin with daily INR; needs 2-day 

overlap





appreciate social work with their help on this complicated social situation 





Charge nurse, , and administration is aware of the patient's 

irregular behavior, his trip to [facility], etc. 


He has been counseled this is not acceptable and all trips to the [facility] from 

this point forward will be supervised.  





I am suspicious about illicit drug use during these trips; urine drug screen 

now. 





Is is safe for him to leave hospital with PICC line due to drug abuse history??


d/w Dr. Johns. 








Paul Desouza MD


 (Paul Desouza MD)

## 2017-08-04 NOTE — MEDICAL STUDENT: [facility]
Med Student Progress Note


Date of Service


Aug 4, 2017.





Subjective


Our meeting today was brief, as he is primarily staying inpatient due to 

housing concerns at this point. He feels the pain with breathing continues to 

improve, and so does the back pain. He has been wearing compression stockings 

for the ankle swelling and continues to walk around. He mentioned that 

yesterday he left the hospital and went to [facility] and experienced dizziness 

while there. He has not had chills recently, and denies fever, sweats, n/v/d, 

constipation, cough, sputum production, chest pain, or psychiatric changes. I 

asked him about smoking since being in the hospital and he claims to have had 3 

cigarettes during the time he has been here. He denies any other substances 

during his stay. He describes that the housing situation is still the same as 

yesterday and that he needs to wait until Mon/Tue.





Review of Systems


Constitutional:  + see HPI, + problem reported (Dizziness), No fever, No chills

, No sweats, No weakness, No fatigue


ENT:  No sore throat


Respiratory:  + problem reported (pain with breathing), No cough, No sputum, No 

wheezing, No shortness of breath, No dyspnea on exertion, No dyspnea at rest, 

No hemoptysis


Cardiac:  + edema (swelling of ankles), No chest pain, No orthopnea, No PND, No 

claudication, No palpitations


Abdomen:  No pain, No nausea, No vomiting, No diarrhea, No constipation, No GI 

bleeding, No problem reported


Neurologic:  No memory loss


Psychiatric:  No depression symptoms, No anxiety, No substance abuse


Skin:  No rash, No new/changing skin lesions, No color change





Objective


Vital Signs











  Date Time  Temp Pulse Resp B/P (MAP) Pulse Ox O2 Delivery O2 Flow Rate FiO2


 


8/4/17 09:09 36.7 100 20 127/85 (99) 96 Room Air  


 


8/4/17 08:00      Room Air  


 


8/4/17 00:00      Room Air  


 


8/3/17 22:18 36.4 99 18 136/95 (109) 96 Room Air  


 


8/3/17 20:00      Room Air  


 


8/3/17 16:15      Room Air  


 


8/3/17 15:30 37.1 107 18 152/90 (110) 98 Room Air  











Physical Exam


General Appearance:  WD/WN, no apparent distress


ENT:  hearing grossly normal, pharynx normal


Neck:  supple, no JVD, trachea midline


Respiratory/Chest:  chest non-tender, lungs clear, normal breath sounds, no 

respiratory distress, no accessory muscle use


Cardiovascular:  regular rate, rhythm, no gallop, no JVD, no murmur


Abdomen:  non tender, soft, no pulsatile mass


Extremities:  non-tender, no calf tenderness, normal capillary refill, + pedal 

edema (non-pitting edema of ankles bilaterally)


Neurologic/Psychiatric:  alert, normal mood/affect, oriented x 3


Skin:  normal color, warm/dry, no rash





Laboratory Results





Last 24 Hours








Test


  8/4/17


05:19


 


White Blood Count 11.05 K/uL 


 


Red Blood Count 4.38 M/uL 


 


Hemoglobin 13.1 g/dL 


 


Hematocrit 37.9 % 


 


Mean Corpuscular Volume 86.5 fL 


 


Mean Corpuscular Hemoglobin 29.9 pg 


 


Mean Corpuscular Hemoglobin


Concent 34.6 g/dl 


 


 


RDW Standard Deviation 35.4 fL 


 


RDW Coefficient of Variation 11.3 % 


 


Platelet Count 260 K/uL 


 


Mean Platelet Volume 9.4 fL 


 


Prothrombin Time 19.7 SECONDS 


 


Prothromb Time International


Ratio 1.8 


 


 


Activated Partial


Thromboplast Time 51.0 SECONDS 


 


 


Partial Thromboplastin Ratio 2.0 


 


Sodium Level 139 mmol/L 


 


Potassium Level 4.2 mmol/L 


 


Chloride Level 104 mmol/L 


 


Carbon Dioxide Level 31 mmol/L 


 


Anion Gap 4.0 mmol/L 


 


Blood Urea Nitrogen 11 mg/dl 


 


Creatinine 1.00 mg/dl 


 


Est Creatinine Clear Calc


Drug Dose 110.8 ml/min 


 


 


Estimated GFR (


American) 113.3 


 


 


Estimated GFR (Non-


American 97.8 


 


 


BUN/Creatinine Ratio 10.9 


 


Random Glucose 108 mg/dl 


 


Calcium Level 9.4 mg/dl 











Assessment and Plan


Assessment and Plan:


Brandon is a 35 yo  male with history of hypercoagulability, PE 

and non-IV substance abuse who presented for shortness of breath and chest pain 

and was diagnosed with Actinomyces Pneumonia. His 7/31 blood cultures have 

shown no growth and he is currently on Penicillin IV, Lovenox, and Warfarin. In 

the past 24 hours his vitals have been notable for episodes of tachycardia, 

continued hypertension, and afebrile. His WBC has decreased from 16.89 to 11.05 

and H/H continues to improve. INR is 1.8 and PTT is 57.0





Pneumonia, Actinomyces:


- Cultures 7/31 negative for growth


- Chest pain improving


- Afebrile


- Ambulating and doing incentive spirometry





- Continue with IV PCN


- Plan for new PICC line in Left arm prior to discharge


- Continue to manage housing issue for discharge


- Monitor vitals Q8-12 for fever


- CBC, INR, PTT prior to discharge











Hypercoagulability:


- INR 1.8


- PTT 57.0


- Tolerating medications well


- Ambulating regularly





- Continue lovenox and D/C once INR is within 2-3 for 48 hrs


- Continue with Warfarin for the foreseeable future


Continued [facility] stay due to:  other (insertion of new PICC line)


Discharge planning:  home

## 2017-08-05 NOTE — PROGRESS NOTE
Subjective


Date of Service:


Aug 5, 2017.


Subjective


Pt evaluation today including:  conversation w/ patient, physical exam, chart 

review, lab review, review of inpatient medication list


Pain:  none voiced


PO Intake:  normal


Voiding:  no voiding problems


feels good


reports he now will have an apartment in [place] on Monday





denies cough


denies dyspnea





right arm swelling resolved





no new complaints





Problem List


Medical Problems:


(1) Polysubstance abuse


Status: Acute  





(2) Pulmonary embolism


Status: Acute  





(3) Pulmonary infarction


Status: Acute  





(4) Sleep deprivation


Status: Acute  











Review of Systems


Constitutional:  No fever, No chills


Respiratory:  No cough, No sputum, No shortness of breath, No dyspnea on 

exertion


Cardiac:  No chest pain, No orthopnea


Abdomen:  No pain





Objective


Vital Signs











  Date Time  Temp Pulse Resp B/P (MAP) Pulse Ox O2 Delivery O2 Flow Rate FiO2


 


8/5/17 16:00      Room Air  


 


8/5/17 14:50 36.8 81 16 129/77 (94) 98 Room Air  


 


8/5/17 08:00      Room Air  


 


8/5/17 07:13 36.5 92 20 142/93 (109) 98   


 


8/5/17 00:00      Room Air  


 


8/4/17 23:56 36.9 91 18 147/88 (107) 97 Room Air  











Physical Exam


General Appearance:  no apparent distress


ENT:  + pertinent finding (MMM)


Neck:  no JVD


Respiratory/Chest:  no respiratory distress, no accessory muscle use, + crackles

 (right base; airation improved)


Cardiovascular:  regular rate, rhythm, no gallop, no murmur


Abdomen:  normal bowel sounds, non tender, soft, no organomegaly


Extremities:  no pedal edema, + pertinent finding (right arm swelling just 

about resolved)


Neurologic/Psychiatric:  alert, oriented x 3


Skin:  no rash, + pertinent finding (PICC line, right upper arm - clean)





Laboratory Results





Last 24 Hours








Test


  8/5/17


05:40 8/5/17


05:55


 


Urine Opiates Screen NEG  


 


Urine Methadone, Qualitative NEG  


 


Urine Barbiturates NEG  


 


Urine Phencyclidine (PCP)


Level NEG 


  


 


 


Ur


Amphetamine/Methamphetamine POS 


  


 


 


MDMA (Ecstasy) Screen NEG  


 


Urine Benzodiazepines Screen NEG  


 


Urine Cocaine Metabolite NEG  


 


Urine Marijuana (THC) NEG  


 


Prothrombin Time  20.3 SECONDS 


 


Prothromb Time International


Ratio 


  1.8 


 











Assessment and Plan


33yo male with: 





1.  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 

improved.  Repeat blood cx's negative.  Cont IV PCN as previous. 


Uunderlying risk factor for this infection - drug abuse; fortunately HIV 

negative


ID to follow after discharge. 





2.  RUE DVT - lovenox/coumadin. 





3.  recent PE - lovenox/coumadin.  INR 1.8 for 2 days.  Will increase coumadin 

to 10mg for 1-2 days.  INR in am. 





4.  drug abuse (meth): I am suspicious he may have used meth during a trip 

downstairs to the outside of the hospital or even during his trip to [place] 

recently.


Urine drug screen + for meth.  


He has been counseled he cannot leave the hospital grounds and any trip 

downstairs needs to be supervised.  





5.  homelessness / complex social situation - apparently will have apartment 

locally on Monday. 





6.  leukocytosis - resolved.  





7.  right-sided pleural effusion - CT chest tomorrow night to reassess.  CT 

surgery has been following. 





8.  oddly his tox screen was negative for narcotics despite him receiving such 

while hospitalized. 


Is it possible that he was keeping the oxycodone in his possession and giving 

these to visitors?


very odd -


since pain is resolved will d/c oxycodone, ultram, etc


tylenol as needed at this time


Continued [facility] stay due to:  multiple IV medications needed


Discharge planning:  home with home health

## 2017-08-06 NOTE — PROGRESS NOTE
Subjective


Date of Service:


Aug 6, 2017.


Subjective


Pt evaluation today including:  conversation w/ patient, physical exam, chart 

review, lab review, review of studies (CT chest), review of inpatient 

medication list


Pain:  none - pleurisy resolved


PO Intake:  excellent


Voiding:  no voiding problems


feels good


again confirms he will have an apartment in [place] starting tomorrow





staff voice no concerns





Problem List


Medical Problems:


(1) Polysubstance abuse


Status: Acute  





(2) Pulmonary embolism


Status: Acute  





(3) Pulmonary infarction


Status: Acute  





(4) Sleep deprivation


Status: Acute  











Review of Systems


Constitutional:  No fever, No chills


Respiratory:  No cough, No sputum, No wheezing, No shortness of breath, No 

dyspnea on exertion


Cardiac:  No chest pain, No orthopnea


Abdomen:  No pain





Objective


Vital Signs











  Date Time  Temp Pulse Resp B/P (MAP) Pulse Ox O2 Delivery O2 Flow Rate FiO2


 


8/6/17 16:03 36.7 112 18 161/113 (129) 100 Room Air  





    166/92 (116)    


 


8/6/17 15:40     99 Room Air  


 


8/6/17 08:00      Room Air  


 


8/6/17 07:36 36.6 85 20 143/82 (102) 99   


 


8/6/17 00:00     96 Room Air  


 


8/5/17 22:49 36.4 88 16 137/76 (96) 96 Room Air  











Physical Exam


General Appearance:  no apparent distress


ENT:  + pertinent finding (burn marks buccal mucosa?)


Neck:  no JVD


Respiratory/Chest:  no respiratory distress, no accessory muscle use, + rales (

resolving rales Right base o/w CTA b/l )


Cardiovascular:  regular rate, rhythm, no gallop, no murmur


Abdomen:  normal bowel sounds, non tender, soft, no organomegaly


Extremities:  no pedal edema


Neurologic/Psychiatric:  alert, oriented x 3


Skin:  no rash, + pertinent finding (PICC line, RUE - clean)





Laboratory Results





Last 24 Hours








Test


  8/6/17


06:24


 


Prothrombin Time 28.0 SECONDS 


 


Prothromb Time International


Ratio 2.5 


 











Assessment and Plan


33yo male with: 





1.  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 

improved.  Repeat blood cx's negative.  Cont IV PCN as previous. 


Underlying risk factor for this infection - drug abuse; fortunately HIV negative


ID to follow after discharge. 


Will have IV PCN via PICC at discharge. 





2.  RUE DVT - lovenox/coumadin. 





3.  recent PE - lovenox/coumadin.  INR 2.5 today.  Reduce coumadin back to 5mg 

daily.  INR in am. 





4.  drug abuse (meth): I am suspicious he may have used meth during a trip 

downstairs to the outside of the hospital or even during his trip to [place] 

recently.


Urine drug screen + for meth.  


He has been counseled he cannot leave the hospital grounds and any trip 

downstairs needs to be supervised.  





5.  homelessness / complex social situation - apparently will have apartment 

locally on Monday. 





6.  leukocytosis - resolved.  





7.  right-sided pleural effusion - CT chest today with resolving right sided 

pneumonia and improved effusion.


No Rx needed.


Appreciate CT surgery consultation. 








social work informed today he will have housing tomorrow





d/c home tomorrow





if INR >2 tomorrow he would have completed his 2-day overlap and will not need 

additional lovenox


Continued [facility] stay due to:  multiple IV medications needed


Discharge planning:  home with home health

## 2017-08-06 NOTE — DIAGNOSTIC IMAGING REPORT
(CHEST) CT THORAX WITHOUT



CT DOSE: 289.84 mGy.cm



HISTORY:      eval for ongoing effusion, infiltrates



TECHNIQUE: Multiaxial CT images of the chest were performed without contrast.  A

dose lowering technique was utilized adhering to the principles of ALARA.



COMPARISON: Chest CT 7/27/2017.



FINDINGS: The central airways are patent. No pneumothorax. The left lung is

essentially clear. Improving groundglass densities within the right upper lobe.

Small right pleural effusion and right basilar consolidative opacities have

significantly improved. Stable 3 mm groundglass nodule within the left lower

lobe on image 31. No fractures within the visualized osseous structures. A right

PICC terminates in the proximal SVC. Normal caliber thoracic aorta. The heart is

normal in size. No significant pericardial effusion. The visualized liver and

spleen are unremarkable.



IMPRESSION: 



1. Significant improvement with near complete resolution of the right basilar

consolidative opacities.

2. Small right pleural effusion has also improved. 







Electronically signed by:  Mauricio Orellana M.D.

8/6/2017 6:14 PM



Dictated Date/Time:  8/6/2017 6:07 PM

## 2017-08-07 NOTE — SURGERY PROGRESS NOTE
Subjective


Date of Service:  Aug 7, 2017.


Pt. notes he feels good.  No SOB or CP.  No fevers, shakes, chills.





Objective


Vitals











  Date Time  Temp Pulse Resp B/P (MAP) Pulse Ox O2 Delivery O2 Flow Rate FiO2


 


8/7/17 10:50 37.0 94 18  97 Room Air  


 


8/7/17 08:00     97 Room Air  


 


8/7/17 07:41 37.0 94 18 146/90 (108) 97 Room Air  


 


8/7/17 00:00     99 Room Air  


 


8/6/17 22:33 36.7 100 18 149/94 (112) 99 Room Air  


 


8/6/17 16:03 36.7 112 18 161/113 (129) 100 Room Air  





    166/92 (116)    


 


8/6/17 15:40     99 Room Air  











Physical Exam


General:  + well developed, + well nourished


CV:  + RRR


Pulmonary:  + lungs clear, 


   No accessory muscle use, No respiratory distress





Assessment & Plan


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


-CT scan repeated today and shows marked improvement:


   -at this pint decortication will not be needed


-continue abx. as ordered by primary service





-case & CT reviewed with Dr. Hamlin of pulmonary medicine:


   -he recommends obtaining a PA & lateral CX prior to d/c for future 

comparison purposes (this study has been ordered)





-Dr. Edwards's office will call pt. and arrange a 2 week follow-up appt.

## 2017-08-07 NOTE — DIAGNOSTIC IMAGING REPORT
CHEST 2 VIEWS ROUTINE



CLINICAL HISTORY: pneumonia     



COMPARISON STUDY:  7/22/2017



FINDINGS: The cardiac and mediastinal contours are normal. There is a

right-sided PICC catheter tip of which projects at the superior vena cava. There

is increasing small right pleural effusion. There are minor right basilar

airspace opacities likely atelectatic.[ 



IMPRESSION: Increasing small right pleural effusion. Minor right basilar

atelectasis.







Electronically signed by:  Haseeb Rooney M.D.

8/7/2017 12:36 PM



Dictated Date/Time:  8/7/2017 12:35 PM

## 2017-08-07 NOTE — INFECTIOUS DISEASE PROGRESS NT
Progress Note


Date of Service


Aug 7, 2017.





Subjective


Pt evaluation today including:  conversation w/ patient, physical exam, chart 

review, lab review, review of studies, conversation w/ consultant, review of 

inpatient medication list


No new complaints. remains afebrile.





   All Other Systems:  Reviewed and Negative





Medications





Current Inpatient Medications








 Medications


  (Trade)  Dose


 Ordered  Sig/Jae


 Route  Start Time


 Stop Time Status Last Admin


Dose Admin


 


 Al Hydrox/Mg


 Hydrox/Simethicone


  (Maalox Max Susp)  15 ml  Q4H  PRN


 PO  7/27/17 17:30


 8/26/17 17:29   


 


 


 Magnesium


 Hydroxide


  (Milk Of


 Magnesia Susp)  30 ml  Q12H  PRN


 PO  7/27/17 17:30


 8/26/17 17:29   


 


 


 Ondansetron HCl


  (Zofran Inj)  4 mg  Q6H  PRN


 IV  7/27/17 17:30


 8/26/17 17:29   


 


 


 Polyethylene


  (Miralax Powder


 Packet)  17 gm  DAILY  PRN


 PO  7/27/17 17:30


 8/26/17 17:29   


 


 


 Gabapentin


  (Neurontin Cap)  200 mg  TID


 PO  7/27/17 21:00


 8/26/17 20:59  8/7/17 08:25


200 MG


 


 Nicotine


  (Nicoderm Cq


 14MG Patch)  1 patch  QAM


 TD  7/27/17 18:00


 8/26/17 17:59  8/7/17 08:25


1 PATCH


 


 Miscellaneous


  (Remove Nicoderm


 Patch)  1 ea  HS


 N/A  7/27/17 21:00


 8/26/17 20:59  8/5/17 21:09


1 EA


 


 Acetaminophen 650


 mg/Empty Bag  65 ml @ 


 260 mls/hr  Q6H  PRN


 IV  7/28/17 12:45


 8/27/17 12:44   


 


 


 Celecoxib


  (CeleBREX CAP)  100 mg  BID


 PO  7/28/17 12:45


 8/27/17 12:44  8/7/17 08:25


100 MG


 


 Heparin Sodium/


 Dextrose  500 ml @ 


 25 mls/hr  Q20H PRN


 IV  7/29/17 00:15


 8/28/17 00:14 Future Hold 8/2/17 07:16


25 MLS/HR


 


 Heparin Sodium


  (Porcine)


  (Heparin 10 Unit/


 ml 5 ml Flush)  5 ml  PRN  PRN


 FLUSH  7/31/17 17:00


 8/30/17 16:59  8/6/17 17:39


5 ML


 


 Penicillin G


 Potassium 6 mu/


 Dextrose  262 ml @ 


 100 mls/hr  Q6@0400,1000,1600,2200


 IV  8/1/17 16:00


 8/15/17 15:59  8/7/17 10:00


100 MLS/HR


 


 Enoxaparin Sodium


  (Lovenox Inj)  90 mg  Q12


 SQ  8/2/17 12:00


 9/1/17 11:59  8/7/17 08:26


90 MG


 


 Acetaminophen


  (Tylenol Tab)  1,000 mg  Q8  PRN


 PO  8/5/17 13:30


 9/4/17 13:29   


 


 


 Warfarin Sodium


  (Coumadin Tab)  5 mg  DAILY@16


 PO  8/6/17 16:00


 8/31/17 17:59  8/6/17 16:25


5 MG











Objective


Vital Signs











  Date Time  Temp Pulse Resp B/P (MAP) Pulse Ox O2 Delivery O2 Flow Rate FiO2


 


8/7/17 10:50 37.0 94 18  97 Room Air  


 


8/7/17 08:00     97 Room Air  


 


8/7/17 07:41 37.0 94 18 146/90 (108) 97 Room Air  


 


8/7/17 00:00     99 Room Air  


 


8/6/17 22:33 36.7 100 18 149/94 (112) 99 Room Air  


 


8/6/17 16:03 36.7 112 18 161/113 (129) 100 Room Air  





    166/92 (116)    


 


8/6/17 15:40     99 Room Air  











Physical Exam


General Appearance:  WD/WN, no apparent distress


Eyes:  normal inspection, sclerae normal


ENT:  normal ENT inspection, pharynx normal


Neck:  supple, trachea midline


Respiratory/Chest:  chest non-tender, no respiratory distress, + rhonchi


Cardiovascular:  regular rate, rhythm, no gallop, no murmur


Abdomen:  normal bowel sounds, non tender, soft, no organomegaly


Extremities:  non-tender, no calf tenderness


Neurologic/Psychiatric:  alert, oriented x 3


Skin:  normal color, no rash


Lymphatic:  no adenopathy





Laboratory Results





Last 24 Hours








Test


  8/7/17


06:18


 


White Blood Count 13.03 K/uL 


 


Red Blood Count 4.31 M/uL 


 


Hemoglobin 13.3 g/dL 


 


Hematocrit 37.1 % 


 


Mean Corpuscular Volume 86.1 fL 


 


Mean Corpuscular Hemoglobin 30.9 pg 


 


Mean Corpuscular Hemoglobin


Concent 35.8 g/dl 


 


 


RDW Standard Deviation 35.7 fL 


 


RDW Coefficient of Variation 11.4 % 


 


Platelet Count 250 K/uL 


 


Mean Platelet Volume 8.9 fL 


 


Prothrombin Time 31.0 SECONDS 


 


Prothromb Time International


Ratio 2.8 


 


 


Sodium Level 139 mmol/L 


 


Potassium Level 4.0 mmol/L 


 


Chloride Level 106 mmol/L 


 


Carbon Dioxide Level 27 mmol/L 


 


Anion Gap 6.0 mmol/L 


 


Blood Urea Nitrogen 10 mg/dl 


 


Creatinine 1.00 mg/dl 


 


Est Creatinine Clear Calc


Drug Dose 110.8 ml/min 


 


 


Estimated GFR (


American) 113.3 


 


 


Estimated GFR (Non-


American 97.8 


 


 


BUN/Creatinine Ratio 10.4 


 


Random Glucose 97 mg/dl 


 


Calcium Level 9.3 mg/dl 











Assessment and Plan


Patient with chest pain,  pleuritic in nature, and possible pneumonia of the 

RML and RLL with Actinomyces growing in 1/2 initial blood cultures. He will be 

discharged for outpatient treatment with IV penicillin as discussed with 

hospitalist service.  Will follow up with patient in the office.

## 2017-08-17 NOTE — HISTORY AND PHYSICAL
History & Physical


Date & Time of Service:


Aug 17, 2017 at 20:37


Chief Complaint:


Arm Feels Infected/Inr Is Low/Back Pain


Primary Care Physician:


No Doctor, Assigned


History of Present Illness


Source:  patient


The patient was difficult to interview secondary to agitation, some of the 

history was taken from history records


This is a 35 yo m with a history of sickle cell trait, lupus anticoag pos, 

pulmonary emboli, drug abuse and actinomyces pulmonary infection that is 

presenting to us with right arm pain and swelling surrounding his PICC site. 

The patient states that he has been generally feeling unwell for two months and 

was admitted to the hospital for a pulmonary embolism and pneumonia on July 27th. He was found to have actinomyces infection during this visit and he was 

started on PCN G 24 IV x 6 weeks and was to be transitioned to PO antibiotics. 

He was also transitioned to coumadin from IV heparin as it was recommended that 

he not be on a NOAC during this acute event.  The patient states he has been 

compliant however he is subtherapeutic in the ED. He was evaluated with an echo 

during the previous admission as there was concern for endocarditis and he was 

found to have an intra atrial shunt.


   He has a history of drug abuse and during the previous admission he was 

actually leaving the facility and concern for use of meth as he would come back 

elevated in mood and agitated.  He states that he has not used meth since the 

previous hospitalization but states he has been taking Adderall.  He also has a 

history of explosive disorder. He currently does not have a home and is living 

at his friend's house. He has a girlfriend however he is unable to stay at her 

house and she is supporting him. He is currently working at [employer] which 

is his fourth job and is concerned he will lose his job. 


Results of echo in July 2017 are as follows: 


* 1. Normal LV size and wall thickness.


* 2. Normal LV systolic funciton.  LVEF 60-65%.  No regional wall motion 

abnormalities.


* 3. Normal RV size and function.


* 4. No significant valvular pathology.  No vegetations noted.


* 5. Positive bubble study for interatrial shunt.


* 6. No prior studies for comparison.





Past Medical/Surgical History


Pulmonary Embolism 


Right pleural effusion


Anxiety/ Depression 


PTSD


Explosive disorder


Intra atrial shunt





Family History





FH: pulmonary embolism


Heart disease


Hypertension





Social History


Smoking Status:  Current Every Day Smoker


Smokeless Tobacco Use:  No


Alcohol Use:  occasionally


Drug Use:  other


Marital Status:  in relationship


Housing status:  other


Occupational Status:  employed





Allergies


Coded Allergies:  


     Fish (Verified  Allergy, Severe, ANAPHYLAXIS, 7/22/17)





Home Medications


Scheduled


Gabapentin (Neurontin), 200 MG PO TID


Lactobacillus (Floranex), 4 TABS PO TID


Warfarin Sodium (Coumadin), 2.5 MG PO Q2D


Warfarin Sodium (Coumadin), 5 MG PO Q2D





Review of Systems


Constitutional:  No fever


Eyes:  No worsening of vision


ENT:  No hearing loss


Respiratory:  + problem reported ("feels like clots in chest"), No cough, No 

sputum, No wheezing, No shortness of breath, No dyspnea on exertion, No dyspnea 

at rest


Cardiovascular:  No chest pain


Abdomen:  No pain, No nausea, No vomiting, No diarrhea, No constipation


Musculoskeletal:  + swelling (right UE around PICC site), No joint pain, No 

muscle pain


Neurologic:  No weakness, No numbness/tingling, No balance problems


Endocrine:  No fatigue


Integumentary:  No rash





Physical Exam


Vital Signs











  Date Time  Temp Pulse Resp B/P (MAP) Pulse Ox O2 Delivery O2 Flow Rate FiO2


 


8/17/17 18:36  100 18 126/89 99 Room Air  


 


8/17/17 16:30  87 15     


 


8/17/17 16:25  76 10     


 


8/17/17 16:20  79 10     


 


8/17/17 16:15  82 18     


 


8/17/17 16:11  104      


 


8/17/17 16:10  90 20     


 


8/17/17 16:05  89 15     


 


8/17/17 16:00  92 17     


 


8/17/17 15:28     98 Room Air  


 


8/17/17 13:49 36.9 101 18 137/86 99 Room Air  








General Appearance:  no apparent distress, + pertinent finding (Very Anxious )


Head:  normocephalic, atraumatic


Eyes:  normal inspection, + pertinent finding (no eye contact during discussion)


ENT:  normal ENT inspection


Neck:  supple


Respiratory/Chest:  normal breath sounds, no respiratory distress, no accessory 

muscle use


Cardiovascular:  regular rate, rhythm, no murmur, normal peripheral pulses


Abdomen/GI:  normal bowel sounds, non tender, soft


Back:  normal inspection, no CVA tenderness


Extremities/Musculoskelatal:  normal inspection, no calf tenderness, no pedal 

edema, + swelling (right UE, mild erythema on inner aspect of the right UE, 

slightly tender to palpation, nonfluctuant)


Neurologic/Psych:  alert, normal mood/affect, oriented x 3


Skin:  normal color, warm/dry, no rash


Lymphatic:  no adenopathy





Diagnostics


Laboratory Results





Results Past 24 Hours








Test


  8/17/17


15:50 8/17/17


16:28 8/17/17


18:40 Range/Units


 


 


White Blood Count 11.82   4.8-10.8  K/uL


 


Red Blood Count 4.23   4.7-6.1  M/uL


 


Hemoglobin 13.0   14.0-18.0  g/dL


 


Hematocrit 36.2   42-52  %


 


Mean Corpuscular Volume 85.6     fL


 


Mean Corpuscular Hemoglobin 30.7   25-34  pg


 


Mean Corpuscular Hemoglobin


Concent 35.9


  


  


  32-36  g/dl


 


 


Platelet Count 279   130-400  K/uL


 


Mean Platelet Volume 9.2   7.4-10.4  fL


 


Neutrophils (%) (Auto) 58.1    %


 


Lymphocytes (%) (Auto) 18.4    %


 


Monocytes (%) (Auto) 11.4    %


 


Eosinophils (%) (Auto) 11.3    %


 


Basophils (%) (Auto) 0.3    %


 


Neutrophils # (Auto) 6.87   1.4-6.5  K/uL


 


Lymphocytes # (Auto) 2.18   1.2-3.4  K/uL


 


Monocytes # (Auto) 1.35   0.11-0.59  K/uL


 


Eosinophils # (Auto) 1.33   0-0.5  K/uL


 


Basophils # (Auto) 0.03   0-0.2  K/uL


 


RDW Standard Deviation 36.1   36.4-46.3  fL


 


RDW Coefficient of Variation 11.6   11.5-14.5  %


 


Immature Granulocyte % (Auto) 0.5    %


 


Immature Granulocyte # (Auto) 0.06   0.00-0.02  K/uL


 


Erythrocyte Sedimentation Rate  29  0-14  mm/hr


 


Prothrombin Time


  


  16.7


  


  9.0-12.0


SECONDS


 


Prothromb Time International


Ratio 


  1.5


  


  0.9-1.1  


 


 


Activated Partial


Thromboplast Time 


  43.2


  


  21.0-31.0


SECONDS


 


Partial Thromboplastin Ratio  1.7   


 


Sodium Level  137  136-145  mmol/L


 


Potassium Level  3.6  3.5-5.1  mmol/L


 


Chloride Level  104    mmol/L


 


Carbon Dioxide Level  28  21-32  mmol/L


 


Anion Gap  5.0  3-11  mmol/L


 


Blood Urea Nitrogen  14  7-18  mg/dl


 


Creatinine


  


  1.10


  


  0.60-1.40


mg/dl


 


Est Creatinine Clear Calc


Drug Dose 


  100.7


  


   ml/min


 


 


Estimated GFR (


American) 


  101.0


  


   


 


 


Estimated GFR (Non-


American 


  87.1


  


   


 


 


BUN/Creatinine Ratio  12.5  10-20  


 


Random Glucose  111  70-99  mg/dl


 


Calcium Level  9.0  8.5-10.1  mg/dl


 


Total Bilirubin  0.2  0.2-1  mg/dl


 


Aspartate Amino Transf


(AST/SGOT) 


  21


  


  15-37  U/L


 


 


Alanine Aminotransferase


(ALT/SGPT) 


  22


  


  12-78  U/L


 


 


Alkaline Phosphatase  142    U/L


 


Troponin I  < 0.015  0-0.045  ng/ml


 


C-Reactive Protein  6.29  0-0.29  mg/dl


 


Total Protein  7.4  6.4-8.2  gm/dl


 


Albumin  3.3  3.4-5.0  gm/dl


 


Globulin  4.1  2.5-4.0  gm/dl


 


Albumin/Globulin Ratio  0.8  0.9-2  


 


Thyroid Stimulating Hormone


(TSH) 


  0.622


  


  0.300-4.500


uIu/ml


 


Urine Color   YELLOW  


 


Urine Appearance   CLEAR CLEAR  


 


Urine pH   6.0 4.5-7.5  


 


Urine Specific Gravity   1.036 1.000-1.030  


 


Urine Protein   NEG NEG  


 


Urine Glucose (UA)   NEG NEG  


 


Urine Ketones   NEG NEG  


 


Urine Occult Blood   NEG NEG  


 


Urine Nitrite   NEG NEG  


 


Urine Bilirubin   NEG NEG  


 


Urine Urobilinogen   NEG NEG  


 


Urine Leukocyte Esterase   SMALL NEG  


 


Urine WBC (Auto)   10-30 0-5  /hpf


 


Urine RBC (Auto)   0-4 0-4  /hpf


 


Urine Hyaline Casts (Auto)   1-5 0-5  /lpf


 


Urine Epithelial Cells (Auto)   10-20 0-5  /lpf


 


Urine Bacteria (Auto)   NEG NEG  








Microbiology Results


8/17/17 Blood Culture, Received


          Pending


8/17/17 Blood Culture, Received


          Pending


8/17/17 Urine Culture, Received


          Pending





Diagnostic Radiology


RIGHT EXTREMITY NONVASCULAR LIMITED





CLINICAL HISTORY: 34 years-old Male presenting with poss abscess or clot--picc


line sore right arm


Right. 





TECHNIQUE: Real-time grayscale ultrasound imaging of the right upper extremity


was performed. Color Doppler was also performed.





COMPARISON:  Correlation made to dedicated vascular examination of the right


upper extremity performed contemporaneously.





FINDINGS:


Subcutaneous edema noted in the mid upper arm. Extensive filling defect within


the basilic vein associated with the PICC, consistent with nearly occlusive


thrombus. 





No focal fluid collection to suggest abscess. No hyperemia on color Doppler.





IMPRESSION:


1.  Basilic vein thrombus associated with the PICC. Please see separately


dictated Doppler ultrasound examination of the right upper extremity.





2.  No focal fluid collection to suggest abscess. Extensive subcutaneous edema.








CT ANGIOGRAM OF THE CHEST





CLINICAL HISTORY: Atypical chest pain.





COMPARISON STUDY:  Chest CT scans dated 8/6/2017 and 7/14/2017. Chest x-ray


dated 8/7/2017.





TECHNIQUE: Following the IV administration of 90 cc of Optiray 320, CT angiogram


of the chest was performed from the upper abdomen to the thoracic inlet


utilizing the pulmonary embolus protocol. Images are reviewed in the axial,


sagittal, and coronal planes. 3-D MIPS images are created and assessed. IV


contrast was administered without complication.  A dose lowering technique was


utilized adhering to the principles of ALARA.





CT DOSE: 544.09 mGycm





FINDINGS:





Thyroid: Imaged portions of the thyroid gland are normal in size and


attenuation.





Thoracic aorta: The thoracic aorta is normal in caliber and demonstrates


standard 3-vessel arch anatomy. No dissection is seen. A right PICC line is in


place.





Pulmonary vasculature: The pulmonary trunk is normal in caliber. There are no


filling defects identified in main, lobar, or segmental pulmonary branches to


suggest pulmonary embolus.





Heart: The heart is normal in size and configuration, and without pericardial


effusion.





Lungs and pleural spaces: There is a small right pleural effusion with right


basilar consolidation. The lungs are otherwise clear. The trachea and central


airways are patent.





Mediastinum: There is no mediastinal lymphadenopathy.





Clare: Clear.





Axillae: There is no axillary lymphadenopathy.





Upper abdomen: Partially visualized upper abdominal viscera is within normal


limits.





Skeletal structures: No lytic or blastic bony lesions are seen.








IMPRESSION:





1. There is no evidence of pulmonary embolus in the main, lobar, or segmental


pulmonary arteries.





2. There is a small right pleural effusion with associated right basilar


consolidation. This has modestly decreased in size from 8/6/2017. Consolidative


change likely represents atelectasis. Correlate clinically for evidence of


superimposed pneumonia.





3. The lungs are otherwise clear.





RIGHT VENOUS DOPPLER UPR EXT UNIL





CLINICAL HISTORY: 34 years-old Male presenting with swelling poss clot


Right. 





TECHNIQUE: Real-time grayscale and color and spectral Doppler ultrasound imaging


of the veins of the right upper extremity was performed. Compression and


augmentation were also utilized.





COMPARISON: 8/1/2017.





FINDINGS: 


Right:


Subclavian vein: Patent.


Internal jugular vein: Patent.


Axillary vein: Patent.


Brachial vein: Patent.


Cephalic vein: Limited nonocclusive filling defect suggestive of small residual


thrombus.


Basilic vein: Nearly occlusive filling defect within the basilic vein in the mid


upper arm, which also contains a PICC. Distally, slow flow is suspected.


Radial vein: Patent.


Ulnar vein: Patent. 





Other: Subcutaneous edema of the right upper arm.





IMPRESSION:


Nearly occlusive thrombus in the basilic vein associated with the PICC. This is


new from prior.





Limited nonocclusive cephalic vein thrombus decreased from prior.





EKG


Normal sinus rhythm


Nonspecific T wave abnormality


Abnormal ECG


When compared with ECG of 29-JUL-2017 06:44,


ST no longer elevated in Anterior leads


Nonspecific T wave abnormality now evident in Anterior leads





Impression


Assessment and Plan


This is a 35 yo m with a history of pulmonary embolism, actinomyces infection 

requiring IV antibiotics, drug abuse and currently suffering from a thrombosis 

on his PICC and subtherapeutic INR.





Thrombosis on PICC; patient has intra atrial shunt


- tele admission, concern that thrombosis detaches due to shunt 


- O2 per nursing protocol 


- heparin IV initiated 


- consult coag clinic 





Subtherapeutic INR in the presence of thrombosis; thrombophilic state


- patient was initially on Xarelto prior to this incident and changed to 

Coumadin 


- question poor compliance 


- will continue current dose of warfarin and adjust accordingly 





Actinomyces infection; pulmonary


- continue IV PCN G 6 iu qid per previous admission


- consult ID as PICC will need to be removed and discuss need for ongoing IV 

PCN vs transition to PO





Drug abuse; h/o meth use, Suboxone


- discussed with patient to refrain from going outside while he is at the 

hospital, concern for drug abuse 


- metoprolol prn for HTN or tachycardia and Ativan for agitation





H/O PTSD/ anxiety/ depression 


- patient is rather anxious and agitated, quick to get upset with certain 

remarks 


- ativan is available for anxiety 





DVT Prophylaxis 


- heparin IV 








Resident Physician Supervision Note:





I was present with Dr. Lorenzana during the history and exam. I discussed the case 

with the resident and agree with the findings and plan as documented in the 

note.  Any exceptions or clarifications are listed here:





Troubled 33 y/o M with extensive polysubstance abuse history, hypercoagulable 

state, Actinomyces bacteremia - pt currently with PICC line


Presented with pain/swelling R arm at PICC site - DVT confirmed





OE


AAO x 3


S1,2 R


CTAB


NT, ND


Swelling and tenderness of R arm





P:


Pt placed on anticoagulation and we will have PICC changed prior to D/C 


Will need to cont ABx - as he has significant drug use issues - for the 

duration of the ABx therapy, he would certainly be better off in a rehab 

facility - unclear if he would accept this or if this can be arranged but this 

should be addressed with  prior to DC


Documented By:  Delvis Bejarano





Level of Care


Telemetry





VTE Prophylaxis


VTE Risk Assessment Done? Y/N:  Yes


Risk Level:  High


Given or contraindicated:  Other Anticoagulation





Social Service Consult


Homeless





Note


Total Time:   Critical Care 30 - 74 minutes

## 2017-08-17 NOTE — EMERGENCY ROOM VISIT NOTE
History


Report prepared by Osiris:  Do Birch


Under the Supervision of:  Dr. Fernando Abraham M.D.


First contact with patient:  15:03


Chief Complaint:  INFECTION


Stated Complaint:  ARM FEELS INFECTED/INR IS LOW/BACK PAIN


Nursing Triage Summary:  


Pt arrived to triage, ambulatory, eating [place] [place]





Pt states his INR level low, yesterday 1.3. "I'm mildly confused, but back 

feels 


like shattering glass, I feel like my Picc line is infected" Picc for PCN. 


Infection in blood. "and I might be having a PE because that's how I felt last 


time I had one. I have chest pain that comes and goes. I know I have blood 

clots 


on my chest, I can feel them."





History of Present Illness


The patient is a 34 year old male who presents to the Emergency Room with 

complaints of a possible worsening infection for the past several days. He 

currently has a PICC line in place in his right arm for IV antibiotics and 

states "I feel like the PICC line is infected". He is currently receiving 

Penicillin through the line and states his arm feels "very sore", rating his 

discomfort as a 9/10. The PICC line was placed around August 3rd. The patient 

reports the site occasionally produces purulent drainage and is "hard and red". 

He was treated here in the ED recently for a cough, pneumonia and pleurisy and 

was discharged home on August 7th. He admits to intermittent chest pain 

recently and states "I can feel clots in my chest". He has a personal history 

of PE's as well as as extensive family history. He notes he woke up "very 

disoriented and confused" this afternoon, which prompted him wanting to come to 

the ED today. The patient notes his most recent INR was 1.3. He also complains 

of back pain and the chills, describing his back pain as feeling like "

shattered glass". He denies any recent fevers, nausea or vomiting.





   Source of History:  patient


   Onset:  several days PTA


   Position:  arm (right)


   Symptom Intensity:  9/10


   Timing:  worsening


   Associated Symptoms:  + chills, + chest pain, + back pain, No fevers, No 

nausea, No vomiting





Review of Systems


See HPI for pertinent positives & negatives. A total of 10 systems reviewed and 

were otherwise negative.





Past Medical & Surgical


Medical Problems:


(1) Chest pain, pleuritic


(2) Pulmonary embolism


(3) Thrombosis in peripherally inserted central catheter (PICC)








Family History





FH: pulmonary embolism


Heart disease


Hypertension





Social History


Smoking Status:  Current Every Day Smoker


Alcohol Use:  heavy


Drug Use:  other


Marital Status:  in relationship


Housing Status:  lives with significant other


Occupation Status:  employed





Current/Historical Medications


Scheduled


Gabapentin (Neurontin), 200 MG PO TID


Lactobacillus (Floranex), 4 TABS PO TID


Warfarin Sodium (Coumadin), 2.5 MG PO Q2D


Warfarin Sodium (Coumadin), 5 MG PO Q2D





Allergies


Coded Allergies:  


     Fish (Verified  Allergy, Severe, ANAPHYLAXIS, 7/22/17)





Physical Exam


Vital Signs











  Date Time  Temp Pulse Resp B/P (MAP) Pulse Ox O2 Delivery O2 Flow Rate FiO2


 


8/17/17 18:36  100 18 126/89 99 Room Air  


 


8/17/17 16:30  87 15     


 


8/17/17 16:25  76 10     


 


8/17/17 16:20  79 10     


 


8/17/17 16:15  82 18     


 


8/17/17 16:11  104      


 


8/17/17 16:10  90 20     


 


8/17/17 16:05  89 15     


 


8/17/17 16:00  92 17     


 


8/17/17 15:28     98 Room Air  


 


8/17/17 13:49 36.9 101 18 137/86 99 Room Air  











Physical Exam


GENERAL: Patient is in no acute distress.


HEENT: No acute trauma, normocephalic atraumatic, mucous membranes moist, no 

nasal congestion, no scleral icterus.


NECK: No stridor, no adenopathy, no meningismus, trachea is midline.


LUNGS: Clear to auscultation bilaterally, no wheeze, no rhonchi, breath sounds 

equal.


HEART: Without murmurs gallops or rubs, regular rate and rhythm.


ABDOMEN: Soft, nontender, bowel sounds positive, no hernias, no peritonitis.


EXTREMITIES: There is a PICC line in the right medial arm, no drainage from 

insertion site, but there is some tenderness at the site, and what seems to be 

edema/swelling. No cyanosis, full range of motion of all the joints without 

pain or difficulty, no signs for acute trauma.


NEUROLOGIC: Oriented x 3, no acute motor or sensory deficits, no focal weakness.


SKIN: No rash, no jaundice, no diaphoresis.





Medical Decision & Procedures


ER Provider


Diagnostic Interpretation:


Radiology results as stated below per my review and radiologist interpretation:





RIGHT VENOUS DOPPLER UPR EXT UNIL





CLINICAL HISTORY: 34 years-old Male presenting with swelling poss clot


Right. 





TECHNIQUE: Real-time grayscale and color and spectral Doppler ultrasound imaging


of the veins of the right upper extremity was performed. Compression and


augmentation were also utilized.





COMPARISON: 8/1/2017.





FINDINGS: 


Right:


Subclavian vein: Patent.


Internal jugular vein: Patent.


Axillary vein: Patent.


Brachial vein: Patent.


Cephalic vein: Limited nonocclusive filling defect suggestive of small residual


thrombus.


Basilic vein: Nearly occlusive filling defect within the basilic vein in the mid


upper arm, which also contains a PICC. Distally, slow flow is suspected.


Radial vein: Patent.


Ulnar vein: Patent. 





Other: Subcutaneous edema of the right upper arm.





IMPRESSION:


Nearly occlusive thrombus in the basilic vein associated with the PICC. This is


new from prior.





Limited nonocclusive cephalic vein thrombus decreased from prior.





Electronically signed by:  Kurt Nam M.D.


8/17/2017 6:39 PM








CT ANGIOGRAM OF THE CHEST





CLINICAL HISTORY: Atypical chest pain.





COMPARISON STUDY:  Chest CT scans dated 8/6/2017 and 7/14/2017. Chest x-ray


dated 8/7/2017.





TECHNIQUE: Following the IV administration of 90 cc of Optiray 320, CT angiogram


of the chest was performed from the upper abdomen to the thoracic inlet


utilizing the pulmonary embolus protocol. Images are reviewed in the axial,


sagittal, and coronal planes. 3-D MIPS images are created and assessed. IV


contrast was administered without complication.  A dose lowering technique was


utilized adhering to the principles of ALARA.





CT DOSE: 544.09 mGycm





FINDINGS:





Thyroid: Imaged portions of the thyroid gland are normal in size and


attenuation.





Thoracic aorta: The thoracic aorta is normal in caliber and demonstrates


standard 3-vessel arch anatomy. No dissection is seen. A right PICC line is in


place.





Pulmonary vasculature: The pulmonary trunk is normal in caliber. There are no


filling defects identified in main, lobar, or segmental pulmonary branches to


suggest pulmonary embolus.





Heart: The heart is normal in size and configuration, and without pericardial


effusion.





Lungs and pleural spaces: There is a small right pleural effusion with right


basilar consolidation. The lungs are otherwise clear. The trachea and central


airways are patent.





Mediastinum: There is no mediastinal lymphadenopathy.





Clare: Clear.





Axillae: There is no axillary lymphadenopathy.





Upper abdomen: Partially visualized upper abdominal viscera is within normal


limits.





Skeletal structures: No lytic or blastic bony lesions are seen.





IMPRESSION:





1. There is no evidence of pulmonary embolus in the main, lobar, or segmental


pulmonary arteries.


2. There is a small right pleural effusion with associated right basilar


consolidation. This has modestly decreased in size from 8/6/2017. Consolidative


change likely represents atelectasis. Correlate clinically for evidence of


superimposed pneumonia.


3. The lungs are otherwise clear.





Electronically signed by:  Fernando Leach M.D.


8/17/2017 6:49 PM








RIGHT EXTREMITY NONVASCULAR LIMITED





CLINICAL HISTORY: 34 years-old Male presenting with poss abscess or clot--picc


line sore right arm


Right. 





TECHNIQUE: Real-time grayscale ultrasound imaging of the right upper extremity


was performed. Color Doppler was also performed.





COMPARISON:  Correlation made to dedicated vascular examination of the right


upper extremity performed contemporaneously.





FINDINGS:


Subcutaneous edema noted in the mid upper arm. Extensive filling defect within


the basilic vein associated with the PICC, consistent with nearly occlusive


thrombus. 





No focal fluid collection to suggest abscess. No hyperemia on color Doppler.





IMPRESSION:


1.  Basilic vein thrombus associated with the PICC. Please see separately


dictated Doppler ultrasound examination of the right upper extremity.


2.  No focal fluid collection to suggest abscess. Extensive subcutaneous edema.





Electronically signed by:  Kurt Nam M.D.


8/17/2017 6:41 PM





Laboratory Results


8/17/17 15:50








Red Blood Count 4.23, Mean Corpuscular Volume 85.6, Mean Corpuscular Hemoglobin 

30.7, Mean Corpuscular Hemoglobin Concent 35.9, Mean Platelet Volume 9.2, 

Neutrophils (%) (Auto) 58.1, Lymphocytes (%) (Auto) 18.4, Monocytes (%) (Auto) 

11.4, Eosinophils (%) (Auto) 11.3, Basophils (%) (Auto) 0.3, Neutrophils # (Auto

) 6.87, Lymphocytes # (Auto) 2.18, Monocytes # (Auto) 1.35, Eosinophils # (Auto

) 1.33, Basophils # (Auto) 0.03





8/17/17 16:28

















Test


  8/17/17


15:50 8/17/17


16:28 8/17/17


18:40


 


White Blood Count


  11.82 K/uL


(4.8-10.8) 


  


 


 


Red Blood Count


  4.23 M/uL


(4.7-6.1) 


  


 


 


Hemoglobin


  13.0 g/dL


(14.0-18.0) 


  


 


 


Hematocrit 36.2 % (42-52)   


 


Mean Corpuscular Volume


  85.6 fL


() 


  


 


 


Mean Corpuscular Hemoglobin


  30.7 pg


(25-34) 


  


 


 


Mean Corpuscular Hemoglobin


Concent 35.9 g/dl


(32-36) 


  


 


 


Platelet Count


  279 K/uL


(130-400) 


  


 


 


Mean Platelet Volume


  9.2 fL


(7.4-10.4) 


  


 


 


Neutrophils (%) (Auto) 58.1 %   


 


Lymphocytes (%) (Auto) 18.4 %   


 


Monocytes (%) (Auto) 11.4 %   


 


Eosinophils (%) (Auto) 11.3 %   


 


Basophils (%) (Auto) 0.3 %   


 


Neutrophils # (Auto)


  6.87 K/uL


(1.4-6.5) 


  


 


 


Lymphocytes # (Auto)


  2.18 K/uL


(1.2-3.4) 


  


 


 


Monocytes # (Auto)


  1.35 K/uL


(0.11-0.59) 


  


 


 


Eosinophils # (Auto)


  1.33 K/uL


(0-0.5) 


  


 


 


Basophils # (Auto)


  0.03 K/uL


(0-0.2) 


  


 


 


RDW Standard Deviation


  36.1 fL


(36.4-46.3) 


  


 


 


RDW Coefficient of Variation


  11.6 %


(11.5-14.5) 


  


 


 


Immature Granulocyte % (Auto) 0.5 %   


 


Immature Granulocyte # (Auto)


  0.06 K/uL


(0.00-0.02) 


  


 


 


Erythrocyte Sedimentation Rate


  


  29 mm/hr


(0-14) 


 


 


Prothrombin Time


  


  16.7 SECONDS


(9.0-12.0) 


 


 


Prothromb Time International


Ratio 


  1.5 (0.9-1.1) 


  


 


 


Activated Partial


Thromboplast Time 


  43.2 SECONDS


(21.0-31.0) 


 


 


Partial Thromboplastin Ratio  1.7  


 


Anion Gap


  


  5.0 mmol/L


(3-11) 


 


 


Est Creatinine Clear Calc


Drug Dose 


  100.7 ml/min 


  


 


 


Estimated GFR (


American) 


  101.0 


  


 


 


Estimated GFR (Non-


American 


  87.1 


  


 


 


BUN/Creatinine Ratio  12.5 (10-20)  


 


Calcium Level


  


  9.0 mg/dl


(8.5-10.1) 


 


 


Total Bilirubin


  


  0.2 mg/dl


(0.2-1) 


 


 


Aspartate Amino Transf


(AST/SGOT) 


  21 U/L (15-37) 


  


 


 


Alanine Aminotransferase


(ALT/SGPT) 


  22 U/L (12-78) 


  


 


 


Alkaline Phosphatase


  


  142 U/L


() 


 


 


Troponin I


  


  < 0.015 ng/ml


(0-0.045) 


 


 


C-Reactive Protein


  


  6.29 mg/dl


(0-0.29) 


 


 


Total Protein


  


  7.4 gm/dl


(6.4-8.2) 


 


 


Albumin


  


  3.3 gm/dl


(3.4-5.0) 


 


 


Globulin


  


  4.1 gm/dl


(2.5-4.0) 


 


 


Albumin/Globulin Ratio  0.8 (0.9-2)  


 


Thyroid Stimulating Hormone


(TSH) 


  0.622 uIu/ml


(0.300-4.500) 


 


 


Urine Color   YELLOW 


 


Urine Appearance   CLEAR (CLEAR) 


 


Urine pH   6.0 (4.5-7.5) 


 


Urine Specific Gravity


  


  


  1.036


(1.000-1.030)


 


Urine Protein   NEG (NEG) 


 


Urine Glucose (UA)   NEG (NEG) 


 


Urine Ketones   NEG (NEG) 


 


Urine Occult Blood   NEG (NEG) 


 


Urine Nitrite   NEG (NEG) 


 


Urine Bilirubin   NEG (NEG) 


 


Urine Urobilinogen   NEG (NEG) 


 


Urine Leukocyte Esterase   SMALL (NEG) 


 


Urine WBC (Auto)


  


  


  10-30 /hpf


(0-5)


 


Urine RBC (Auto)   0-4 /hpf (0-4) 


 


Urine Hyaline Casts (Auto)   1-5 /lpf (0-5) 


 


Urine Epithelial Cells (Auto)


  


  


  10-20 /lpf


(0-5)


 


Urine Bacteria (Auto)   NEG (NEG) 





Laboratory results reviewed by me.





Medications Administered











 Medications


  (Trade)  Dose


 Ordered  Sig/Jae


 Route  Start Time


 Stop Time Status Last Admin


Dose Admin


 


 Sodium Chloride  500 ml @ 


 999 mls/hr  Q31M STAT


 IV  8/17/17 15:09


 8/17/17 15:39 DC 8/17/17 16:00


999 MLS/HR











ECG


Indication:  chest pain


Rate (beats per minute):  80


Rhythm:  normal sinus


Findings:  nonspecific-ST abn, no acute ischemic change, no ectopy





ED Course


1507: The patient was evaluated in room A3. A complete history and physical 

exam was performed.





1509:  ml @ 999 mls/hr IV. 





1856: I reevaluated the patient. I discussed my recommendation he remain in the 

hospital for further evaluation and management and he declined. I will contact 

his most recent hospital physician for their recommendation. 





1900: I discussed the patients case with Dr. Lenn[facility] Hospitalist. He 

also recommends the patient should be further evaluated by the hospital 

medicine team. 





1916: I reevaluated the patient. I once again discussed my recommendation he 

remain in the hospital for further evaluation and management and he is 

agreeable to this plan. 





1917: I discussed the patients case with Dr. Lenn[facility] Hospitalist. The 

patient will be further evaluated.





Medical Decision


The differential diagnose considered include thrombosed PICC line, infected 

PICC line, PE, pneumonia, failed outpatient treatment, anemia, electrolyte 

imbalance or bacteremia. 





There is a mild leukocytosis, this could be consistent with infection with the 

stress of the situation.  No concerning anemia.  Sedimentation rate and CRP are 

both elevated consistent with ongoing infection/inflammation.  Urinalysis does 

not show infection.  INR is subtherapeutic for someone on Coumadin at 1.5.  

Upper extremity ultrasound does not show any evidence for abscess.  There is a 

right upper extremity DVT in the area of the PICC line.  Chest CT does not show 

any pneumonia or acute PE.  EKG shows a normal sinus rhythm, no acute ischemia.

  Cardiac enzyme testing times one is not consistent with acute cardiac injury.





The patient received IV saline.  I discussed my findings with the patient, I 

talked to case management.  Given the acute clot, given the failed outpatient 

treatment, admission/observation is warranted.  The on-call hospitalist has 

been consulted.





Medication Reconcilliation


Current Medication List:  was personally reviewed by me





Blood Pressure Screening


Patient's blood pressure:  Normal blood pressure


Blood pressure disposition:  Did not require urgent referral





Consults


Time Called:  1900


Consulting Physician:  Dr. [facility] Hospitalist


Returned Call:  1900


I discussed the patients case with Dr. [facility] Hospitalist. He also 

recommends the patient should be further evaluated by the hospital medicine 

team.





Impression





 Primary Impression:  


 Deep vein thrombosis (DVT) of right upper extremity


 Additional Impressions:  


 Subtherapeutic international normalized ratio (INR)


 PICC line complication





Scribe Attestation


The scribe's documentation has been prepared under my direction and personally 

reviewed by me in its entirety. I confirm that the note above accurately 

reflects all work, treatment, procedures, and medical decision making performed 

by me.





Departure Information


Dispostion


Being Evaluated By Hospitalist





Referrals


No Doctor, Assigned (PCP)





Patient Instructions


My [facility]





Problem Qualifiers

## 2017-08-17 NOTE — DIAGNOSTIC IMAGING REPORT
CT ANGIOGRAM OF THE CHEST



CLINICAL HISTORY: Atypical chest pain.



COMPARISON STUDY:  Chest CT scans dated 8/6/2017 and 7/14/2017. Chest x-ray

dated 8/7/2017.



TECHNIQUE: Following the IV administration of 90 cc of Optiray 320, CT angiogram

of the chest was performed from the upper abdomen to the thoracic inlet

utilizing the pulmonary embolus protocol. Images are reviewed in the axial,

sagittal, and coronal planes. 3-D MIPS images are created and assessed. IV

contrast was administered without complication.  A dose lowering technique was

utilized adhering to the principles of ALARA.



CT DOSE: 544.09 mGycm



FINDINGS:



Thyroid: Imaged portions of the thyroid gland are normal in size and

attenuation.



Thoracic aorta: The thoracic aorta is normal in caliber and demonstrates

standard 3-vessel arch anatomy. No dissection is seen. A right PICC line is in

place.



Pulmonary vasculature: The pulmonary trunk is normal in caliber. There are no

filling defects identified in main, lobar, or segmental pulmonary branches to

suggest pulmonary embolus.



Heart: The heart is normal in size and configuration, and without pericardial

effusion.



Lungs and pleural spaces: There is a small right pleural effusion with right

basilar consolidation. The lungs are otherwise clear. The trachea and central

airways are patent.



Mediastinum: There is no mediastinal lymphadenopathy.



Clare: Clear.



Axillae: There is no axillary lymphadenopathy.



Upper abdomen: Partially visualized upper abdominal viscera is within normal

limits.



Skeletal structures: No lytic or blastic bony lesions are seen.





IMPRESSION:



1. There is no evidence of pulmonary embolus in the main, lobar, or segmental

pulmonary arteries.



2. There is a small right pleural effusion with associated right basilar

consolidation. This has modestly decreased in size from 8/6/2017. Consolidative

change likely represents atelectasis. Correlate clinically for evidence of

superimposed pneumonia.



3. The lungs are otherwise clear.







Electronically signed by:  Fernando Leach M.D.

8/17/2017 6:49 PM



Dictated Date/Time:  8/17/2017 6:37 PM

## 2017-08-17 NOTE — DIAGNOSTIC IMAGING REPORT
RIGHT EXTREMITY NONVASCULAR LIMITED



CLINICAL HISTORY: 34 years-old Male presenting with poss abscess or clot--picc

line sore right arm

Right. 



TECHNIQUE: Real-time grayscale ultrasound imaging of the right upper extremity

was performed. Color Doppler was also performed.



COMPARISON:  Correlation made to dedicated vascular examination of the right

upper extremity performed contemporaneously.



FINDINGS:

Subcutaneous edema noted in the mid upper arm. Extensive filling defect within

the basilic vein associated with the PICC, consistent with nearly occlusive

thrombus. 



No focal fluid collection to suggest abscess. No hyperemia on color Doppler.



IMPRESSION:

1.  Basilic vein thrombus associated with the PICC. Please see separately

dictated Doppler ultrasound examination of the right upper extremity.



2.  No focal fluid collection to suggest abscess. Extensive subcutaneous edema.







Electronically signed by:  Kurt Nam M.D.

8/17/2017 6:41 PM



Dictated Date/Time:  8/17/2017 6:39 PM

## 2017-08-17 NOTE — DIAGNOSTIC IMAGING REPORT
RIGHT VENOUS DOPPLER UPR EXT UNIL



CLINICAL HISTORY: 34 years-old Male presenting with swelling poss clot

Right. 



TECHNIQUE: Real-time grayscale and color and spectral Doppler ultrasound imaging

of the veins of the right upper extremity was performed. Compression and

augmentation were also utilized.



COMPARISON: 8/1/2017.



FINDINGS: 

Right:

Subclavian vein: Patent.

Internal jugular vein: Patent.

Axillary vein: Patent.

Brachial vein: Patent.

Cephalic vein: Limited nonocclusive filling defect suggestive of small residual

thrombus.

Basilic vein: Nearly occlusive filling defect within the basilic vein in the mid

upper arm, which also contains a PICC. Distally, slow flow is suspected.

Radial vein: Patent.

Ulnar vein: Patent. 



Other: Subcutaneous edema of the right upper arm.



IMPRESSION:

Nearly occlusive thrombus in the basilic vein associated with the PICC. This is

new from prior.



Limited nonocclusive cephalic vein thrombus decreased from prior.







Electronically signed by:  Kurt Nam M.D.

8/17/2017 6:39 PM



Dictated Date/Time:  8/17/2017 6:36 PM

## 2017-08-18 NOTE — FAMILY MEDICINE PROGRESS NOTE
Progress Note


Date of Service


Aug 18, 2017.





Subjective


Pt evaluation today including:  conversation w/ patient, conversation w/ family

, physical exam, lab review


Voiding:  no voiding problems


Patient was seen at the bedside. No acute event overnight. Patient states that 

he has some discomfort at the area where PICC line is. Denies chest pain, SOB, 

or calf tenderness.





   Constitutional:  No fever


   Respiratory:  No cough, No sputum, No shortness of breath


   Cardiovascular:  No chest pain, No edema


   Abdomen:  No pain, No nausea, No vomiting


   Male :  No dysuria


   Skin:  No rash





Medications





Current Inpatient Medications








 Medications


  (Trade)  Dose


 Ordered  Sig/Jae


 Route  Start Time


 Stop Time Status Last Admin


Dose Admin


 


 Ioversol


  (Optiray 320)  111 ml  UD  PRN


 IV  8/17/17 15:30


 8/21/17 15:29   


 


 


 Acetaminophen


  (Tylenol Tab)  650 mg  Q4H  PRN


 PO  8/17/17 20:30


 9/16/17 20:29  8/18/17 00:07


650 MG


 


 Al Hydrox/Mg


 Hydrox/Simethicone


  (Maalox Max Susp)  15 ml  Q4H  PRN


 PO  8/17/17 20:30


 9/16/17 20:29   


 


 


 Magnesium


 Hydroxide


  (Milk Of


 Magnesia Susp)  30 ml  Q12H  PRN


 PO  8/17/17 20:30


 9/16/17 20:29   


 


 


 Zolpidem Tartrate


  (Ambien Tab)  5 mg  HSZ  PRN


 PO  8/17/17 20:30


 9/16/17 20:29  8/18/17 00:07


5 MG


 


 Ondansetron HCl


  (Zofran Inj)  4 mg  Q6H  PRN


 IV  8/17/17 20:30


 9/16/17 20:29   


 


 


 Polyethylene


  (Miralax Powder


 Packet)  17 gm  DAILY  PRN


 PO  8/17/17 20:30


 9/16/17 20:29   


 


 


 Gabapentin


  (Neurontin Cap)  200 mg  TID


 PO  8/17/17 21:00


 9/16/17 20:59  8/18/17 14:12


200 MG


 


 Lactobacillus


 Acidophilus


  (Floranex Tab)  4 tab  TID


 PO  8/17/17 21:00


 9/16/17 20:59  8/18/17 14:14


4 TAB


 


 Metoprolol


 Tartrate


  (Lopressor Iv)  5 mg  Q4  PRN


 IV  8/17/17 20:30


 9/16/17 20:29   


 


 


 Lorazepam


  (Ativan Inj)  0.5 mg  Q4H  PRN


 IV  8/17/17 22:00


 9/16/17 21:59   


 


 


 Heparin Sodium/


 Dextrose  500 ml @ 


 24 mls/hr  [id number] PRN


 IV  8/17/17 22:45


 9/16/17 22:44  8/18/17 07:07


29 MLS/HR


 


 Warfarin Sodium


  (Coumadin Tab)  5 mg  DAILY@16


 PO  8/18/17 16:00


 9/17/17 15:59  8/18/17 15:49


5 MG


 


 Amoxicillin


  (Amoxil Cap)  500 mg  TID


 PO  8/18/17 21:00


 8/25/17 20:59 UNV  


 











Objective


Vital Signs











  Date Time  Temp Pulse Resp B/P (MAP) Pulse Ox O2 Delivery O2 Flow Rate FiO2


 


8/18/17 15:30 36.5 73 18 126/76 (93) 98 Room Air  


 


8/18/17 12:07      Room Air  


 


8/18/17 11:36 36.7 70 18 124/84 (97)  Room Air 98.0 


 


8/18/17 08:00      Room Air  


 


8/18/17 07:04 36.8 59 16 107/66 (80) 100 Room Air  


 


8/18/17 04:50  56 18 105/69 (81) 97 Room Air  


 


8/18/17 04:00      Room Air  


 


8/18/17 00:01      Room Air  


 


8/17/17 23:29 36.8 85 20 156/116 (129) 96 Room Air  


 


8/17/17 22:43 37.0 88 18 145/93 97 Room Air  


 


8/17/17 20:30  94 18 135/81 99 Room Air  


 


8/17/17 18:36  100 18 126/89 99 Room Air  


 


8/17/17 16:30  87 15     


 


8/17/17 16:25  76 10     


 


8/17/17 16:20  79 10     


 


8/17/17 16:15  82 18     


 


8/17/17 16:11  104      


 


8/17/17 16:10  90 20     


 


8/17/17 16:05  89 15     


 


8/17/17 16:00  92 17     











Physical Exam


General Appearance:  WD/WN, no apparent distress


Neck:  supple, trachea midline


Respiratory/Chest:  chest non-tender, lungs clear, normal breath sounds, no 

respiratory distress, no accessory muscle use


Cardiovascular:  regular rate, rhythm


Abdomen:  normal bowel sounds, non tender, soft


Extremities:  non-tender, no pedal edema, + pertinent finding (right upper ext: 

non tender to palpation, no significant edema was noted)


Neurologic/Psychiatric:  alert, oriented x 3


Skin:  normal color, warm/dry, no rash





Laboratory Results





Results Past 24 Hours








Test


  8/17/17


16:28 8/17/17


18:40 8/18/17


03:10 8/18/17


03:12 Range/Units


 


 


Erythrocyte Sedimentation Rate 29    0-14  mm/hr


 


Prothrombin Time


  16.7


  


  


  


  9.0-12.0


SECONDS


 


Prothromb Time International


Ratio 1.5


  


  


  


  0.9-1.1  


 


 


Activated Partial


Thromboplast Time 43.2


  


  


  142.4


  21.0-31.0


SECONDS


 


Partial Thromboplastin Ratio 1.7   5.4  


 


Sodium Level 137   138 136-145  mmol/L


 


Potassium Level 3.6   4.0 3.5-5.1  mmol/L


 


Chloride Level 104   105   mmol/L


 


Carbon Dioxide Level 28   27 21-32  mmol/L


 


Anion Gap 5.0   6.0 3-11  mmol/L


 


Blood Urea Nitrogen 14   12 7-18  mg/dl


 


Creatinine


  1.10


  


  


  0.94


  0.60-1.40


mg/dl


 


Est Creatinine Clear Calc


Drug Dose 100.7


  


  


  117.9


   ml/min


 


 


Estimated GFR (


American) 101.0


  


  


  122.1


   


 


 


Estimated GFR (Non-


American 87.1


  


  


  105.4


   


 


 


BUN/Creatinine Ratio 12.5   13.1 10-20  


 


Random Glucose 111   90 70-99  mg/dl


 


Calcium Level 9.0   8.6 8.5-10.1  mg/dl


 


Total Bilirubin 0.2   0.3 0.2-1  mg/dl


 


Aspartate Amino Transf


(AST/SGOT) 21


  


  


  19


  15-37  U/L


 


 


Alanine Aminotransferase


(ALT/SGPT) 22


  


  


  22


  12-78  U/L


 


 


Alkaline Phosphatase 142   134   U/L


 


Troponin I < 0.015    0-0.045  ng/ml


 


C-Reactive Protein 6.29    0-0.29  mg/dl


 


Total Protein 7.4   6.8 6.4-8.2  gm/dl


 


Albumin 3.3   2.9 3.4-5.0  gm/dl


 


Globulin 4.1   3.9 2.5-4.0  gm/dl


 


Albumin/Globulin Ratio 0.8   0.7 0.9-2  


 


Thyroid Stimulating Hormone


(TSH) 0.622


  


  


  


  0.300-4.500


uIu/ml


 


Urine Color  YELLOW    


 


Urine Appearance  CLEAR   CLEAR  


 


Urine pH  6.0   4.5-7.5  


 


Urine Specific Gravity  1.036   1.000-1.030  


 


Urine Protein  NEG   NEG  


 


Urine Glucose (UA)  NEG   NEG  


 


Urine Ketones  NEG   NEG  


 


Urine Occult Blood  NEG   NEG  


 


Urine Nitrite  NEG   NEG  


 


Urine Bilirubin  NEG   NEG  


 


Urine Urobilinogen  NEG   NEG  


 


Urine Leukocyte Esterase  SMALL   NEG  


 


Urine WBC (Auto)  10-30   0-5  /hpf


 


Urine RBC (Auto)  0-4   0-4  /hpf


 


Urine Hyaline Casts (Auto)  1-5   0-5  /lpf


 


Urine Epithelial Cells (Auto)  10-20   0-5  /lpf


 


Urine Bacteria (Auto)  NEG   NEG  


 


Urine Opiates Screen   NEG  NEG  


 


Urine Methadone, Qualitative   NEG  NEG  


 


Urine Barbiturates   NEG  NEG  


 


Urine Phencyclidine (PCP)


Level 


  


  NEG


  


  NEG  


 


 


Ur


Amphetamine/Methamphetamine 


  


  POS


  


  NEG  


 


 


MDMA (Ecstasy) Screen   NEG  NEG  


 


Urine Benzodiazepines Screen   NEG  NEG  


 


Urine Cocaine Metabolite   NEG  NEG  


 


Urine Marijuana (THC)   NEG  NEG  


 


White Blood Count    9.57 4.8-10.8  K/uL


 


Red Blood Count    4.13 4.7-6.1  M/uL


 


Hemoglobin    12.4 14.0-18.0  g/dL


 


Hematocrit    35.5 42-52  %


 


Mean Corpuscular Volume    86.0   fL


 


Mean Corpuscular Hemoglobin    30.0 25-34  pg


 


Mean Corpuscular Hemoglobin


Concent 


  


  


  34.9


  32-36  g/dl


 


 


Platelet Count    253 130-400  K/uL


 


Mean Platelet Volume    9.1 7.4-10.4  fL


 


Neutrophils (%) (Auto)    48.6  %


 


Lymphocytes (%) (Auto)    21.2  %


 


Monocytes (%) (Auto)    13.8  %


 


Eosinophils (%) (Auto)    15.7  %


 


Basophils (%) (Auto)    0.2  %


 


Neutrophils # (Auto)    4.65 1.4-6.5  K/uL


 


Lymphocytes # (Auto)    2.03 1.2-3.4  K/uL


 


Monocytes # (Auto)    1.32 0.11-0.59  K/uL


 


Eosinophils # (Auto)    1.50 0-0.5  K/uL


 


Basophils # (Auto)    0.02 0-0.2  K/uL


 


RDW Standard Deviation    35.5 36.4-46.3  fL


 


RDW Coefficient of Variation    11.4 11.5-14.5  %


 


Immature Granulocyte % (Auto)    0.5  %


 


Immature Granulocyte # (Auto)    0.05 0.00-0.02  K/uL


 


Test


  8/18/17


09:34 8/18/17


12:59 


  


  Range/Units


 


 


Prothrombin Time


  19.5


  


  


  


  9.0-12.0


SECONDS


 


Prothromb Time International


Ratio 1.8


  


  


  


  0.9-1.1  


 


 


Activated Partial


Thromboplast Time 61.6


  67.1


  


  


  21.0-31.0


SECONDS


 


Partial Thromboplastin Ratio 2.4 2.6    








Microbiology Results


8/17/17 Blood Culture, Received


          Pending


8/17/17 Urine Culture - Preliminary, Resulted


          NO GROWTH - LESS THAN 1,000 COLONIES/...





Assessment and Plan


This is a 35 yo m with a history of pulmonary embolism, actinomyces infection 

requiring IV antibiotics, drug abuse and currently suffering from a thrombosis 

on his PICC and subtherapeutic INR. Per ID recommendation will switch him from 

IV penicillin to PO amoxicillin 500mg TID for now. Will continue heparin and 

Coumadin until he becomes therapeutic. PICC is removed. Will give him Coumadin 

7.5mg today. Last INR was 1.8





* Thrombosis on PICC; patient has intra atrial shunt


    - Right UE US showed nearly occlusive thrombus in the basilic vein 

associated with the PICC. This is new from prior. Limited nonocclusive cephalic 

vein thrombus decreased from prior


    - tele admission, concern that thrombosis detaches due to shunt 


    - O2 per nursing protocol 


    - Continue IV heparin 


    - consult coag clinic 


    - Continue heparin + Coumadin 5mg until he becomes therapeutic 





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


   - patient was initially on Xarelto prior to this incident and changed to 

Coumadin 


   - question poor compliance 


   - will continue 5mg warfarin (which was his current dose at home) and adjust 

accordingly 





* Actinomyces infection; pulmonary


    - Switch to PO Amoxicillin 500mg TID 


    - PICC line is removed


    - consult ID and appreciated his recommendation





* Drug abuse; h/o meth use, Suboxone


    - discussed with patient to refrain from going outside while he is at the 

hospital, concern for drug abuse 


    - metoprolol prn for HTN or tachycardia and Ativan for agitation





* H/O PTSD/ anxiety/ depression 


    - patient is rather anxious and agitated, quick to get upset with certain 

remarks 


    - ativan is available for anxiety 





* DVT Prophylaxis 


   - heparin IV 





Resident Physician Supervision Note:





I was present with Dr. Correia during the history and exam. I discussed the case 

with the resident and agree with the findings and plan as documented in the 

note.  Any exceptions or clarifications are listed here: 





1) increase Coumadin to 7.5 mg this evening


2) continue heparin drip


3) he has used Lovenox in the past and it may be reasonable to have him overlap 

for 24-48 hours with therapeutic Coumadin.


4) with his history of lupus anticoagulant, he is probably better on Coumadin 

rather than a novel anticoagulant as there has been really no literature to 

support their use in patients with lupus anticoagulant.


5) the patient requested some dietary teaching and information regarding 

Coumadin and this will be provided for him.





Documented By:  Ramses Eric

## 2017-08-18 NOTE — MEDICAL CONSULT
Consultation


Date of Consultation:


Aug 18, 2017.


Attending Physician:


Delvis Bejarano M.D.


Reason for Consultation:


Actinomyces, drug abuse, need to remove PICC line


History of Present Illness


34-year-old male well known to the Infectious Disease service with history of 

sickle cell trait, drug abuse, lupus anticoagulant, who was recently 

hospitalized with pneumonia and found to positive blood cultures for 

Actinomyces.  He was discharged home on IV penicillin, but now is readmitted 

with progressively worsening swelling of his right arm around his PICC line 

along with pain and erythema.  He has been found to have evidence of thrombosis 

related to his PICC line.  This has now been removed.  He otherwise has been 

feeling reasonably well with improvement in pulmonary symptoms, decreasing cough

, and denies any fever.  No hemoptysis.





Past Medical/Surgical History


Medical Problems:


(1) Deep vein thrombosis (DVT) of right upper extremity


Status: Acute  





(2) Polysubstance abuse


Status: Acute  





(3) Pulmonary embolism


Status: Acute  





(4) Pulmonary infarction


Status: Acute  





(5) Sleep deprivation


Status: Acute  





(6) Subtherapeutic international normalized ratio (INR)


Status: Acute  





Medical Problems:


(1) Chest pain, pleuritic


(2) Pulmonary embolism


(3) Thrombosis in peripherally inserted central catheter (PICC)











Family History





FH: pulmonary embolism


Heart disease


Hypertension





Social History


Smoking Status:  Current Every Day Smoker


Smokeless Tobacco Use:  No


Alcohol Use:  occasionally


Drug Use:  other


Marital Status:  in relationship


Housing Status:  lives with significant other


Occupation Status:  employed





Allergies


Coded Allergies:  


     Fish (Verified  Allergy, Severe, ANAPHYLAXIS, 7/22/17)





Current Inpatient Medications





Current Inpatient Medications








 Medications


  (Trade)  Dose


 Ordered  Sig/Jae


 Route  Start Time


 Stop Time Status Last Admin


Dose Admin


 


 Ioversol


  (Optiray 320)  111 ml  UD  PRN


 IV  8/17/17 15:30


 8/21/17 15:29   


 


 


 Acetaminophen


  (Tylenol Tab)  650 mg  Q4H  PRN


 PO  8/17/17 20:30


 9/16/17 20:29  8/18/17 00:07


650 MG


 


 Al Hydrox/Mg


 Hydrox/Simethicone


  (Maalox Max Susp)  15 ml  Q4H  PRN


 PO  8/17/17 20:30


 9/16/17 20:29   


 


 


 Magnesium


 Hydroxide


  (Milk Of


 Magnesia Susp)  30 ml  Q12H  PRN


 PO  8/17/17 20:30


 9/16/17 20:29   


 


 


 Zolpidem Tartrate


  (Ambien Tab)  5 mg  HSZ  PRN


 PO  8/17/17 20:30


 9/16/17 20:29  8/18/17 00:07


5 MG


 


 Ondansetron HCl


  (Zofran Inj)  4 mg  Q6H  PRN


 IV  8/17/17 20:30


 9/16/17 20:29   


 


 


 Polyethylene


  (Miralax Powder


 Packet)  17 gm  DAILY  PRN


 PO  8/17/17 20:30


 9/16/17 20:29   


 


 


 Gabapentin


  (Neurontin Cap)  200 mg  TID


 PO  8/17/17 21:00


 9/16/17 20:59  8/18/17 14:12


200 MG


 


 Lactobacillus


 Acidophilus


  (Floranex Tab)  4 tab  TID


 PO  8/17/17 21:00


 9/16/17 20:59  8/18/17 14:14


4 TAB


 


 Metoprolol


 Tartrate


  (Lopressor Iv)  5 mg  Q4  PRN


 IV  8/17/17 20:30


 9/16/17 20:29   


 


 


 Penicillin G


 Potassium 6 mu/


 Dextrose  262 ml @ 


 100 mls/hr  Q6@0400,1000,1600,2200


 IV  8/17/17 23:00


 8/24/17 21:59  8/18/17 09:28


100 MLS/HR


 


 Lorazepam


  (Ativan Inj)  0.5 mg  Q4H  PRN


 IV  8/17/17 22:00


 9/16/17 21:59   


 


 


 Heparin Sodium/


 Dextrose  500 ml @ 


 24 mls/hr  [id number] PRN


 IV  8/17/17 22:45


 9/16/17 22:44  8/18/17 07:07


29 MLS/HR


 


 Warfarin Sodium


  (Coumadin Tab)  5 mg  DAILY@16


 PO  8/18/17 16:00


 9/17/17 15:59   


 











Review of Systems


Constitutional:  No fever, No chills


Eyes:  No problem reported


Respiratory:  + cough


Cardiovascular:  No problem reported


Abdomen:  No problem reported


Musculoskeletal:  + swelling


Genitourinary - Male:  No impotence


Neurologic:  No problem reported


Psychiatric:  No problem reported


Endocrine:  No problem reported


Hematologic / Lymphatic:  No problem reported


Integumentary:  + new/changing skin lesions


Allergic / Immunologic:  No problem reported (The)





Physical Exam











  Date Time  Temp Pulse Resp B/P (MAP) Pulse Ox O2 Delivery O2 Flow Rate FiO2


 


8/18/17 12:07      Room Air  


 


8/18/17 11:36 36.7 70 18 124/84 (97)  Room Air 98.0 


 


8/18/17 08:00      Room Air  


 


8/18/17 07:04 36.8 59 16 107/66 (80) 100 Room Air  


 


8/18/17 04:50  56 18 105/69 (81) 97 Room Air  


 


8/18/17 04:00      Room Air  


 


8/18/17 00:01      Room Air  


 


8/17/17 23:29 36.8 85 20 156/116 (129) 96 Room Air  


 


8/17/17 22:43 37.0 88 18 145/93 97 Room Air  


 


8/17/17 20:30  94 18 135/81 99 Room Air  


 


8/17/17 18:36  100 18 126/89 99 Room Air  


 


8/17/17 16:30  87 15     


 


8/17/17 16:25  76 10     


 


8/17/17 16:20  79 10     


 


8/17/17 16:15  82 18     


 


8/17/17 16:11  104      


 


8/17/17 16:10  90 20     


 


8/17/17 16:05  89 15     


 


8/17/17 16:00  92 17     








General Appearance:  WD/WN, no apparent distress


Head:  normocephalic, atraumatic


Eyes:  normal inspection, EOMI, sclerae normal


ENT:  normal ENT inspection, pharynx normal


Neck:  supple, no adenopathy, thyroid normal, trachea midline


Respiratory/Chest:  chest non-tender, lungs clear, normal breath sounds, no 

respiratory distress


Cardiovascular:  regular rate, rhythm, no gallop, no murmur


Abdomen/GI:  normal bowel sounds, non tender, soft, no organomegaly


Back:  normal inspection, no CVA tenderness


Extremities/Musculoskelatal:  no calf tenderness, normal capillary refill, + 

pertinent finding (Right upper extremity swollen and slightly tender)


Neurologic/Psych:  alert, oriented x 3


Skin:  normal color, no rash


Lymphatic:  no adenopathy





Laboratory Results











 Date/Time


Source Procedure


Growth Status


 


 


 8/17/17 16:28


Blood Blood Culture


Pending Received


 


 8/17/17 15:50


Blood Blood Culture


Pending Received


 


 8/17/17 18:40


Urine , Clean Catch Urine Culture - Preliminary


NO GROWTH - LESS THAN 1,000 COLONIES/... Resulted








Last 24 Hours








Test


  8/17/17


15:50 8/17/17


16:28 8/17/17


18:40 8/18/17


03:10


 


White Blood Count 11.82 K/uL    


 


Red Blood Count 4.23 M/uL    


 


Hemoglobin 13.0 g/dL    


 


Hematocrit 36.2 %    


 


Mean Corpuscular Volume 85.6 fL    


 


Mean Corpuscular Hemoglobin 30.7 pg    


 


Mean Corpuscular Hemoglobin


Concent 35.9 g/dl 


  


  


  


 


 


Platelet Count 279 K/uL    


 


Mean Platelet Volume 9.2 fL    


 


Neutrophils (%) (Auto) 58.1 %    


 


Lymphocytes (%) (Auto) 18.4 %    


 


Monocytes (%) (Auto) 11.4 %    


 


Eosinophils (%) (Auto) 11.3 %    


 


Basophils (%) (Auto) 0.3 %    


 


Neutrophils # (Auto) 6.87 K/uL    


 


Lymphocytes # (Auto) 2.18 K/uL    


 


Monocytes # (Auto) 1.35 K/uL    


 


Eosinophils # (Auto) 1.33 K/uL    


 


Basophils # (Auto) 0.03 K/uL    


 


RDW Standard Deviation 36.1 fL    


 


RDW Coefficient of Variation 11.6 %    


 


Immature Granulocyte % (Auto) 0.5 %    


 


Immature Granulocyte # (Auto) 0.06 K/uL    


 


Erythrocyte Sedimentation Rate  29 mm/hr   


 


Prothrombin Time  16.7 SECONDS   


 


Prothromb Time International


Ratio 


  1.5 


  


  


 


 


Activated Partial


Thromboplast Time 


  43.2 SECONDS 


  


  


 


 


Partial Thromboplastin Ratio  1.7   


 


Sodium Level  137 mmol/L   


 


Potassium Level  3.6 mmol/L   


 


Chloride Level  104 mmol/L   


 


Carbon Dioxide Level  28 mmol/L   


 


Anion Gap  5.0 mmol/L   


 


Blood Urea Nitrogen  14 mg/dl   


 


Creatinine  1.10 mg/dl   


 


Est Creatinine Clear Calc


Drug Dose 


  100.7 ml/min 


  


  


 


 


Estimated GFR (


American) 


  101.0 


  


  


 


 


Estimated GFR (Non-


American 


  87.1 


  


  


 


 


BUN/Creatinine Ratio  12.5   


 


Random Glucose  111 mg/dl   


 


Calcium Level  9.0 mg/dl   


 


Total Bilirubin  0.2 mg/dl   


 


Aspartate Amino Transf


(AST/SGOT) 


  21 U/L 


  


  


 


 


Alanine Aminotransferase


(ALT/SGPT) 


  22 U/L 


  


  


 


 


Alkaline Phosphatase  142 U/L   


 


Troponin I  < 0.015 ng/ml   


 


C-Reactive Protein  6.29 mg/dl   


 


Total Protein  7.4 gm/dl   


 


Albumin  3.3 gm/dl   


 


Globulin  4.1 gm/dl   


 


Albumin/Globulin Ratio  0.8   


 


Thyroid Stimulating Hormone


(TSH) 


  0.622 uIu/ml 


  


  


 


 


Urine Color   YELLOW  


 


Urine Appearance   CLEAR  


 


Urine pH   6.0  


 


Urine Specific Gravity   1.036  


 


Urine Protein   NEG  


 


Urine Glucose (UA)   NEG  


 


Urine Ketones   NEG  


 


Urine Occult Blood   NEG  


 


Urine Nitrite   NEG  


 


Urine Bilirubin   NEG  


 


Urine Urobilinogen   NEG  


 


Urine Leukocyte Esterase   SMALL  


 


Urine WBC (Auto)   10-30 /hpf  


 


Urine RBC (Auto)   0-4 /hpf  


 


Urine Hyaline Casts (Auto)   1-5 /lpf  


 


Urine Epithelial Cells (Auto)   10-20 /lpf  


 


Urine Bacteria (Auto)   NEG  


 


Urine Opiates Screen    NEG 


 


Urine Methadone, Qualitative    NEG 


 


Urine Barbiturates    NEG 


 


Urine Phencyclidine (PCP)


Level 


  


  


  NEG 


 


 


Ur


Amphetamine/Methamphetamine 


  


  


  POS 


 


 


MDMA (Ecstasy) Screen    NEG 


 


Urine Benzodiazepines Screen    NEG 


 


Urine Cocaine Metabolite    NEG 


 


Urine Marijuana (THC)    NEG 


 


Test


  8/18/17


03:12 8/18/17


09:34 8/18/17


12:59 


 


 


White Blood Count 9.57 K/uL    


 


Red Blood Count 4.13 M/uL    


 


Hemoglobin 12.4 g/dL    


 


Hematocrit 35.5 %    


 


Mean Corpuscular Volume 86.0 fL    


 


Mean Corpuscular Hemoglobin 30.0 pg    


 


Mean Corpuscular Hemoglobin


Concent 34.9 g/dl 


  


  


  


 


 


Platelet Count 253 K/uL    


 


Mean Platelet Volume 9.1 fL    


 


Neutrophils (%) (Auto) 48.6 %    


 


Lymphocytes (%) (Auto) 21.2 %    


 


Monocytes (%) (Auto) 13.8 %    


 


Eosinophils (%) (Auto) 15.7 %    


 


Basophils (%) (Auto) 0.2 %    


 


Neutrophils # (Auto) 4.65 K/uL    


 


Lymphocytes # (Auto) 2.03 K/uL    


 


Monocytes # (Auto) 1.32 K/uL    


 


Eosinophils # (Auto) 1.50 K/uL    


 


Basophils # (Auto) 0.02 K/uL    


 


RDW Standard Deviation 35.5 fL    


 


RDW Coefficient of Variation 11.4 %    


 


Immature Granulocyte % (Auto) 0.5 %    


 


Immature Granulocyte # (Auto) 0.05 K/uL    


 


Activated Partial


Thromboplast Time 142.4 SECONDS 


  61.6 SECONDS 


  67.1 SECONDS 


  


 


 


Partial Thromboplastin Ratio 5.4  2.4  2.6  


 


Sodium Level 138 mmol/L    


 


Potassium Level 4.0 mmol/L    


 


Chloride Level 105 mmol/L    


 


Carbon Dioxide Level 27 mmol/L    


 


Anion Gap 6.0 mmol/L    


 


Blood Urea Nitrogen 12 mg/dl    


 


Creatinine 0.94 mg/dl    


 


Est Creatinine Clear Calc


Drug Dose 117.9 ml/min 


  


  


  


 


 


Estimated GFR (


American) 122.1 


  


  


  


 


 


Estimated GFR (Non-


American 105.4 


  


  


  


 


 


BUN/Creatinine Ratio 13.1    


 


Random Glucose 90 mg/dl    


 


Calcium Level 8.6 mg/dl    


 


Total Bilirubin 0.3 mg/dl    


 


Aspartate Amino Transf


(AST/SGOT) 19 U/L 


  


  


  


 


 


Alanine Aminotransferase


(ALT/SGPT) 22 U/L 


  


  


  


 


 


Alkaline Phosphatase 134 U/L    


 


Total Protein 6.8 gm/dl    


 


Albumin 2.9 gm/dl    


 


Globulin 3.9 gm/dl    


 


Albumin/Globulin Ratio 0.7    


 


Prothrombin Time  19.5 SECONDS   


 


Prothromb Time International


Ratio 


  1.8 


  


  


 








                                                                               

                                                                 


Patient Name: MCKAYLA RYDER II                               Unit 

Number:  [id number]                  


 








 











Dictated: 08/17/17 1837


 


Transcribed: 08/17/17 1837


 


EV


 


Printed Date/Time: [~ rep prt dt]/[~ rep prt tm]








 [~ rep ct labl] - [~ rep ct ivnm]


 





   [facility]


 Radiology Department


 [place] [address]


 [phone]





 











Dictated: 08/17/17 1837


 


Transcribed: 08/17/17 1837


 


EV


 


Printed Date/Time: [~ rep prt dt]/[~ rep prt tm]








 [~ rep ct labl] - [~ rep ct ivnm]


 








[~ rep ct add3]]


CT ANGIOGRAM OF THE CHEST





CLINICAL HISTORY: Atypical chest pain.





COMPARISON STUDY:  Chest CT scans dated 8/6/2017 and 7/14/2017. Chest x-ray


dated 8/7/2017.





TECHNIQUE: Following the IV administration of 90 cc of Optiray 320, CT angiogram


of the chest was performed from the upper abdomen to the thoracic inlet


utilizing the pulmonary embolus protocol. Images are reviewed in the axial,


sagittal, and coronal planes. 3-D MIPS images are created and assessed. IV


contrast was administered without complication.  A dose lowering technique was


utilized adhering to the principles of ALARA.





CT DOSE: 544.09 mGycm





FINDINGS:





Thyroid: Imaged portions of the thyroid gland are normal in size and


attenuation.





Thoracic aorta: The thoracic aorta is normal in caliber and demonstrates


standard 3-vessel arch anatomy. No dissection is seen. A right PICC line is in


place.





Pulmonary vasculature: The pulmonary trunk is normal in caliber. There are no


filling defects identified in main, lobar, or segmental pulmonary branches to


suggest pulmonary embolus.





Heart: The heart is normal in size and configuration, and without pericardial


effusion.





Lungs and pleural spaces: There is a small right pleural effusion with right


basilar consolidation. The lungs are otherwise clear. The trachea and central


airways are patent.





Mediastinum: There is no mediastinal lymphadenopathy.





Clare: Clear.





Axillae: There is no axillary lymphadenopathy.





Upper abdomen: Partially visualized upper abdominal viscera is within normal


limits.





Skeletal structures: No lytic or blastic bony lesions are seen.








IMPRESSION:





1. There is no evidence of pulmonary embolus in the main, lobar, or segmental


pulmonary arteries.





2. There is a small right pleural effusion with associated right basilar


consolidation. This has modestly decreased in size from 8/6/2017. Consolidative


change likely represents atelectasis. Correlate clinically for evidence of


superimposed pneumonia.





3. The lungs are otherwise clear.











Electronically signed by:  Fernando Leach M.D.


8/17/2017 6:49 PM





Dictated Date/Time:  8/17/2017 6:37 PM





 The status of this report is Signed.   


 Draft = Not yet reviewed or approved by Radiologist.  


 Signed = Reviewed and approved by Radiologist.


<AttendingPhy></AttendingPhy> <FamilyPhy>No Doctor, Assigned</FamilyPhy> <

PrimaryPhy>No Doctor, Assigned</PrimaryPhy> <UnitNumber>[id number]</UnitNumber> 

<VisitNumber>[id number]</VisitNumber> <PatientName>MCKAYLA RYDER </

PatientName> <DateOfBirth>1983</DateOfBirth> <Location>[facility]</Location> <

ServiceDate>08/17/17</ServiceDate> <MNE>ESINDI</MNE> <OrderingPhy>Fernando Abraham M.D.</OrderingPhy> <OrderingPhyMNE>f rep ord dr junior</OrderingPhyMNE> <

DictatingPhyMNE>f rep dict dr junior</DictatingPhyMNE> <CCListMNE>f rep ct mne</

CCListMNE> <AdmittingPhyMNE>f pt admit dr junior</AdmittingPhyMNE> <AttendingPhyMNE

>f pt attend dr junior</AttendingPhyMNE>


<ConsultingPhyMNE>f pt consult dr junior</ConsultingPhyMNE> <FamilyPhyMNE>f pt fam 

dr junior</FamilyPhyMNE> <OtherPhyMNE>f pt other dr junior</OtherPhyMNE> <

PrimaryPhyMNE>f pt prim care dr junior</PrimaryPhyMNE> <ReferringPhyMNE>f pt 

referring dr junior</ReferringPhyMNE>





Assessment & Plan


34-year-old male with sickle cell trait, recently diagnosed with pneumonia with 

Actinomyces.  He now has upper extremity thrombosis related to his PICC line.  

Given difficulty with managing his access, I think at this time given his 

clinical improvement he can be transitioned to oral therapy for Actinomyces 

with amoxicillin 500 milligrams t.i.d..  He will require prolonged therapy to 

adequately treat.  Will follow while in hospital.

## 2017-08-19 NOTE — DISCHARGE SUMMARY
Discharge Summary


Date of Service


Aug 19, 2017.


 (Tamiko Xie MD)





Discharge Summary


Admission Date:


Aug 17, 2017 at 20:23


Discharge Date:  Aug 19, 2017


Discharge Disposition:  Home


Principal Diagnosis:  thrombosis of PICC line in right upper extremity


Problems/Secondary Diagnoses:


Actinomyces pneumonia


Consultations:


Infectious disease


 (Tamiko Xie MD)





Medication Reconciliation


New Medications:  


Amoxicillin (Amoxil) 500 Mg Cap


1 CAP PO TID for 30 Days, #90 CAP 6 Refills





Warfarin Sod (Coumadin) 5 Mg Tab


5 MG PO DAILY@16 for 30 Days, TAB





 


Continued Medications:  


Buprenorphine Hcl-Naloxone Hcl (Suboxone 8-2 Mg) 1 Sub Sub


0.5 TAB PO DAILY





Gabapentin (Neurontin) 100 Mg Cap


200 MG PO TID, CAP





Lactobacillus (Floranex) 1 Tab Tab


4 TABS PO TID





 


Discontinued Medications:  


Warfarin Sodium (Coumadin) 5 Mg Tab


2.5 MG PO Q2D, TAB





Warfarin Sodium (Coumadin) 5 Mg Tab


5 MG PO Q2D, TAB











Discharge Exam


Denies any chest pain, shortness of breath, palpitations, cough.


Denies any fevers or chills or worsening pain at the site of DVT in his right 

upper extremity


Review of Systems:  


   Constitutional:  No fever, No chills


   Eyes:  No worsening of vision


   ENT:  No hearing loss


   Respiratory:  No cough, No sputum


   Abdomen:  No pain, No nausea, No vomiting


   Genitourinary - Male:  No hematuria, No dysuria


   Neurologic:  No memory loss


   Psychiatric:  No depression symptoms


   Endocrine:  No fatigue


Physical Exam:  


   General Appearance:  WD/WN, no apparent distress


   Eyes:  normal inspection


   ENT:  hearing grossly normal


   Neck:  supple


   Respiratory/Chest:  lungs clear, normal breath sounds, no respiratory 

distress


   Cardiovascular:  regular rate, rhythm


   Abdomen / GI:  normal bowel sounds, non tender, soft


   Extremities:  no calf tenderness, no pedal edema, + pertinent finding (

swelling in the right upper extremity, nontender, nonerythematous)


   Neurologic/Psychiatric:  alert, normal mood/affect


   Skin:  normal color, warm/dry, no rash


 (Tamiko Xie MD)





Hospital Course


34-year-old male with a past medical history of sickle cell trait, lupus 

anticoagulant, pulmonary emboli, drug abuse and actinomyces pulmonary infection 

present to the ER with complaints of right arm pain and swelling surrounding 

his PICC line.


The patient was recently admitted to [facility] For pulmonary 

embolus and pneumonia on July 27.  He was found to have an actinomyces 

infection and treated with penicillin G for 6 weeks via his PICC line.  He was 

also discharged on Coumadin to manage his PE.  The patient stated that he had 

been compliant with his Coumadin and had been following with the Coumadin 

clinic. An echo during that time revealed an intra atrial shunt


In the ED, he was found to have a subtherapeutic INR And a Doppler ultrasound 

of his right upper extremity revealed a nearly occlusive thrombus in the 

basilic vein Near his PICC line.


He was started on IV heparin and the PICC line was removed. 


He was bridged to Coumadin using IV heparin initially and later was discharged 

on Lovenox to bridge with Coumadin.


He was recommended to use 5 mg of Coumadin for 2 days along with Lovenox and 

check his INR on Monday and adjust his Coumadin dosage.





In regards to his actinomyces lung infection, IV penicillin was switched to by 

mouth amoxicillin 500 mg 3 times a day For about 6 months per ID 

recommendations.


He was recommended to follow up with Dr. Johns's office in 2 weeks


Total Time Spent:  Greater than 30 minutes


This includes examination of the patient, discharge planning, medication 

reconciliation, and communication with other providers.


 (Tamiko Xie MD)


Resident Physician Supervision Note:





I was present with Dr. Xie during the history and exam. I discussed the case 

with the resident and agree with the findings and plan as documented in the 

note.  Any exceptions or clarifications are listed here: 





Documented By:  Ramses Eric


Total Time Spent:  Greater than 30 minutes


I spent 50 minutes in the discharge of this patient. 


 (Ramses Eric.,D.O.)


Discharge Instructions


Please refer to the electronic Patient Visit Report (Discharge Instructions) 

for additional information.


 (Tamiko Xie MD)





Follow-Up


Follow up with Coumadin clinic on Monday 8/21 to check INR and adjust Coumadin 

dosage.


Follow-up with PCP within one week


Follow-up with Dr. Johns's office in 2 weeks.


 (Tamiko Xie MD)





Resident Tracking


Resident Involvement:  Resident Care Provided


Care Provided:  Adult Hospital Medicine


 (Tamiko Xie MD)

## 2017-08-19 NOTE — DISCHARGE INSTRUCTIONS
Discharge Instructions


Date of Service


Aug 19, 2017.





Admission


Reason for Admission:  Thrombosis In Peripherally Inserted Central Cath





Discharge


Discharge Diagnosis / Problem:  Thrombosis in PICC line





Discharge Goals


Goal(s):  Decrease discomfort, Improve function





Activity Recommendations


Activity Limitations:  resume your previous activity





.





Instructions / Follow-Up


Instructions / Follow-Up


You were admitted after you were found to have a blood clot in the blood vessel 

which had the PICC line and was also found to have a low INR.


The PICC line was removed and he was started on IV heparin for anticoagulation.

  You will be transitioned back to Coumadin with a Lovenox bridge


treatment for actinomyces has been switched to oral antibiotics





Recommendations





PICC line thrombosis:


- Please use Lovenox 120 milligrams subcutaneous injections every day until 

your INR is between 2-3


- Use Coumadin 5 mg today and tomorrow


- Please check your INR on Monday, 8/21 and adjust Coumadin dosage for a goal 

INR of 2-3( you will need to report to the Coumadin clinic for checking INR)


- You can stop the Lovenox once your INR is between 2 and 3


- You may use a compression sleeve to reduce the swelling on your right arm





Actinomyces infection:


- Continue to use amoxicillin 500 mg 3 times a day for 6 months


- Please follow up with infectious disease, Dr. Johns in 2 weeks





Please follow-up with your PCP in about a week


- Please call Dr. Null's office for managing your INR


- Follow up with Dr. Johns in 2 weeks





Current Hospital Diet


Patient's current hospital diet: Regular Diet





Discharge Diet


Recommended Diet:  Regular Diet





Pending Studies


Studies pending at discharge:  no





Laboratory Results





Last 24 Hours








Test


  8/19/17


06:05 8/19/17


13:18


 


White Blood Count 7.08 K/uL  


 


Red Blood Count 4.36 M/uL  


 


Hemoglobin 13.1 g/dL  


 


Hematocrit 37.2 %  


 


Mean Corpuscular Volume 85.3 fL  


 


Mean Corpuscular Hemoglobin 30.0 pg  


 


Mean Corpuscular Hemoglobin


Concent 35.2 g/dl 


  


 


 


RDW Standard Deviation 35.5 fL  


 


RDW Coefficient of Variation 11.4 %  


 


Platelet Count 272 K/uL  


 


Mean Platelet Volume 9.3 fL  


 


Prothrombin Time 19.0 SECONDS  


 


Prothromb Time International


Ratio 1.7 


  


 


 


Activated Partial


Thromboplast Time 76.3 SECONDS 


  


 


 


Partial Thromboplastin Ratio 2.9  











Medical Emergencies








.


Who to Call and When:





Medical Emergencies:  If at any time you feel your situation is an emergency, 

please call 911 immediately.





.





Non-Emergent Contact


Non-Emergency issues call your:  Primary Care Provider





.


.








"Provider Documentation" section prepared by Tamiko Xie.








.





VTE Core Measure


Inpt VTE Proph given/why not?:  Other Anticoagulation (heparin, coumadin)

## 2017-08-25 NOTE — DIAGNOSTIC IMAGING REPORT
CHEST 2 VIEWS ROUTINE



HISTORY:      J18.9 [id number] Pleural effusion on right



COMPARISON: Chest 8/7/2017. 



FINDINGS: The heart is normal in size. The left lung is clear. No pneumothorax.

Small right pleural effusion has slightly increased in size. A few linear

densities the right lung base persist and favor subsegmental atelectasis.



IMPRESSION:

Slight increase in size in the small right pleural effusion.







Electronically signed by:  Mauricio Orellana M.D.

8/25/2017 11:41 AM



Dictated Date/Time:  8/25/2017 11:39 AM